# Patient Record
Sex: MALE | Race: WHITE
[De-identification: names, ages, dates, MRNs, and addresses within clinical notes are randomized per-mention and may not be internally consistent; named-entity substitution may affect disease eponyms.]

---

## 2017-07-03 ENCOUNTER — APPOINTMENT (OUTPATIENT)
Dept: DERMATOLOGY | Facility: CLINIC | Age: 82
End: 2017-07-03

## 2017-07-03 VITALS — BODY MASS INDEX: 24.59 KG/M2 | WEIGHT: 153 LBS | HEIGHT: 66 IN

## 2017-07-03 DIAGNOSIS — Z86.69 PERSONAL HISTORY OF OTHER DISEASES OF THE NERVOUS SYSTEM AND SENSE ORGANS: ICD-10-CM

## 2017-07-03 DIAGNOSIS — H54.7 UNSPECIFIED VISUAL LOSS: ICD-10-CM

## 2017-07-03 RX ORDER — AMMONIUM LACTATE 12 %
12 CREAM (GRAM) TOPICAL
Qty: 140 | Refills: 0 | Status: DISCONTINUED | COMMUNITY
Start: 2017-01-14

## 2017-07-03 RX ORDER — RIVAROXABAN 20 MG/1
20 TABLET, FILM COATED ORAL
Qty: 30 | Refills: 0 | Status: DISCONTINUED | COMMUNITY
Start: 2017-01-26

## 2017-07-03 RX ORDER — AZITHROMYCIN 250 MG/1
250 TABLET, FILM COATED ORAL
Qty: 6 | Refills: 0 | Status: DISCONTINUED | COMMUNITY
Start: 2017-01-19

## 2017-07-03 RX ORDER — ALBUTEROL SULFATE 90 UG/1
108 (90 BASE) AEROSOL, METERED RESPIRATORY (INHALATION)
Qty: 8 | Refills: 0 | Status: DISCONTINUED | COMMUNITY
Start: 2017-01-19

## 2017-07-03 RX ORDER — ASPIRIN 81 MG
81 TABLET, DELAYED RELEASE (ENTERIC COATED) ORAL
Refills: 0 | Status: ACTIVE | COMMUNITY

## 2017-08-03 ENCOUNTER — OUTPATIENT (OUTPATIENT)
Dept: OUTPATIENT SERVICES | Facility: HOSPITAL | Age: 82
LOS: 1 days | Discharge: HOME | End: 2017-08-03
Payer: MEDICARE

## 2017-08-03 DIAGNOSIS — Z98.890 OTHER SPECIFIED POSTPROCEDURAL STATES: Chronic | ICD-10-CM

## 2017-08-03 DIAGNOSIS — K40.90 UNILATERAL INGUINAL HERNIA, WITHOUT OBSTRUCTION OR GANGRENE, NOT SPECIFIED AS RECURRENT: ICD-10-CM

## 2017-08-03 LAB
ALBUMIN SERPL ELPH-MCNC: 3.9 G/DL — SIGNIFICANT CHANGE UP (ref 3.3–5)
ALP SERPL-CCNC: 105 U/L — SIGNIFICANT CHANGE UP (ref 40–120)
ALT FLD-CCNC: 33 U/L — SIGNIFICANT CHANGE UP (ref 12–78)
ANION GAP SERPL CALC-SCNC: 6 MMOL/L — SIGNIFICANT CHANGE UP (ref 5–17)
APPEARANCE UR: CLEAR — SIGNIFICANT CHANGE UP
AST SERPL-CCNC: 31 U/L — SIGNIFICANT CHANGE UP (ref 15–37)
BACTERIA # UR AUTO: (no result)
BASOPHILS # BLD AUTO: 0.1 K/UL — SIGNIFICANT CHANGE UP (ref 0–0.2)
BASOPHILS NFR BLD AUTO: 1.3 % — SIGNIFICANT CHANGE UP (ref 0–2)
BILIRUB SERPL-MCNC: 0.7 MG/DL — SIGNIFICANT CHANGE UP (ref 0.2–1.2)
BILIRUB UR-MCNC: NEGATIVE — SIGNIFICANT CHANGE UP
BUN SERPL-MCNC: 22 MG/DL — SIGNIFICANT CHANGE UP (ref 7–23)
CALCIUM SERPL-MCNC: 9 MG/DL — SIGNIFICANT CHANGE UP (ref 8.5–10.1)
CHLORIDE SERPL-SCNC: 104 MMOL/L — SIGNIFICANT CHANGE UP (ref 96–108)
CO2 SERPL-SCNC: 26 MMOL/L — SIGNIFICANT CHANGE UP (ref 22–31)
COLOR SPEC: YELLOW — SIGNIFICANT CHANGE UP
CREAT SERPL-MCNC: 0.98 MG/DL — SIGNIFICANT CHANGE UP (ref 0.5–1.3)
DIFF PNL FLD: (no result)
EOSINOPHIL # BLD AUTO: 0.4 K/UL — SIGNIFICANT CHANGE UP (ref 0–0.5)
EOSINOPHIL NFR BLD AUTO: 6.4 % — HIGH (ref 0–6)
EPI CELLS # UR: SIGNIFICANT CHANGE UP
GLUCOSE SERPL-MCNC: 110 MG/DL — HIGH (ref 70–99)
GLUCOSE UR QL: NEGATIVE MG/DL — SIGNIFICANT CHANGE UP
HCT VFR BLD CALC: 43.2 % — SIGNIFICANT CHANGE UP (ref 39–50)
HGB BLD-MCNC: 15 G/DL — SIGNIFICANT CHANGE UP (ref 13–17)
KETONES UR-MCNC: NEGATIVE — SIGNIFICANT CHANGE UP
LEUKOCYTE ESTERASE UR-ACNC: NEGATIVE — SIGNIFICANT CHANGE UP
LYMPHOCYTES # BLD AUTO: 1.2 K/UL — SIGNIFICANT CHANGE UP (ref 1–3.3)
LYMPHOCYTES # BLD AUTO: 20.8 % — SIGNIFICANT CHANGE UP (ref 13–44)
MCHC RBC-ENTMCNC: 29.5 PG — SIGNIFICANT CHANGE UP (ref 27–34)
MCHC RBC-ENTMCNC: 34.7 GM/DL — SIGNIFICANT CHANGE UP (ref 32–36)
MCV RBC AUTO: 85.1 FL — SIGNIFICANT CHANGE UP (ref 80–100)
MONOCYTES # BLD AUTO: 0.4 K/UL — SIGNIFICANT CHANGE UP (ref 0–0.9)
MONOCYTES NFR BLD AUTO: 7.2 % — SIGNIFICANT CHANGE UP (ref 2–14)
MRSA PCR RESULT.: SIGNIFICANT CHANGE UP
NEUTROPHILS # BLD AUTO: 3.7 K/UL — SIGNIFICANT CHANGE UP (ref 1.8–7.4)
NEUTROPHILS NFR BLD AUTO: 64.3 % — SIGNIFICANT CHANGE UP (ref 43–77)
NITRITE UR-MCNC: NEGATIVE — SIGNIFICANT CHANGE UP
PH UR: 5 — SIGNIFICANT CHANGE UP (ref 5–8)
PLATELET # BLD AUTO: 176 K/UL — SIGNIFICANT CHANGE UP (ref 150–400)
POTASSIUM SERPL-MCNC: 4.3 MMOL/L — SIGNIFICANT CHANGE UP (ref 3.5–5.3)
POTASSIUM SERPL-SCNC: 4.3 MMOL/L — SIGNIFICANT CHANGE UP (ref 3.5–5.3)
PROT SERPL-MCNC: 7.8 GM/DL — SIGNIFICANT CHANGE UP (ref 6–8.3)
PROT UR-MCNC: NEGATIVE MG/DL — SIGNIFICANT CHANGE UP
RBC # BLD: 5.08 M/UL — SIGNIFICANT CHANGE UP (ref 4.2–5.8)
RBC # FLD: 12.3 % — SIGNIFICANT CHANGE UP (ref 10.3–14.5)
RBC CASTS # UR COMP ASSIST: (no result) /HPF (ref 0–4)
S AUREUS DNA NOSE QL NAA+PROBE: SIGNIFICANT CHANGE UP
SODIUM SERPL-SCNC: 136 MMOL/L — SIGNIFICANT CHANGE UP (ref 135–145)
SP GR SPEC: 1.01 — SIGNIFICANT CHANGE UP (ref 1.01–1.02)
UROBILINOGEN FLD QL: NEGATIVE MG/DL — SIGNIFICANT CHANGE UP
WBC # BLD: 5.7 K/UL — SIGNIFICANT CHANGE UP (ref 3.8–10.5)
WBC # FLD AUTO: 5.7 K/UL — SIGNIFICANT CHANGE UP (ref 3.8–10.5)
WBC UR QL: SIGNIFICANT CHANGE UP

## 2017-08-03 PROCEDURE — 93010 ELECTROCARDIOGRAM REPORT: CPT

## 2017-08-10 ENCOUNTER — OUTPATIENT (OUTPATIENT)
Dept: OUTPATIENT SERVICES | Facility: HOSPITAL | Age: 82
LOS: 1 days | Discharge: HOME | End: 2017-08-10

## 2017-08-10 VITALS
HEART RATE: 73 BPM | OXYGEN SATURATION: 97 % | SYSTOLIC BLOOD PRESSURE: 141 MMHG | HEIGHT: 66 IN | WEIGHT: 155.43 LBS | DIASTOLIC BLOOD PRESSURE: 74 MMHG | RESPIRATION RATE: 16 BRPM | TEMPERATURE: 97 F

## 2017-08-10 VITALS
DIASTOLIC BLOOD PRESSURE: 72 MMHG | RESPIRATION RATE: 16 BRPM | SYSTOLIC BLOOD PRESSURE: 128 MMHG | TEMPERATURE: 97 F | OXYGEN SATURATION: 98 % | HEART RATE: 79 BPM

## 2017-08-10 DIAGNOSIS — Z98.890 OTHER SPECIFIED POSTPROCEDURAL STATES: Chronic | ICD-10-CM

## 2017-08-10 RX ORDER — SODIUM CHLORIDE 9 MG/ML
3 INJECTION INTRAMUSCULAR; INTRAVENOUS; SUBCUTANEOUS EVERY 8 HOURS
Qty: 0 | Refills: 0 | Status: DISCONTINUED | OUTPATIENT
Start: 2017-08-10 | End: 2017-08-25

## 2017-08-10 RX ORDER — ACETAMINOPHEN 500 MG
650 TABLET ORAL EVERY 6 HOURS
Qty: 0 | Refills: 0 | Status: DISCONTINUED | OUTPATIENT
Start: 2017-08-10 | End: 2017-08-25

## 2017-08-10 RX ORDER — ONDANSETRON 8 MG/1
4 TABLET, FILM COATED ORAL ONCE
Qty: 0 | Refills: 0 | Status: DISCONTINUED | OUTPATIENT
Start: 2017-08-10 | End: 2017-08-10

## 2017-08-10 RX ORDER — OXYCODONE HYDROCHLORIDE 5 MG/1
5 TABLET ORAL EVERY 4 HOURS
Qty: 0 | Refills: 0 | Status: DISCONTINUED | OUTPATIENT
Start: 2017-08-10 | End: 2017-08-10

## 2017-08-10 RX ORDER — ACETAMINOPHEN 500 MG
975 TABLET ORAL ONCE
Qty: 0 | Refills: 0 | Status: COMPLETED | OUTPATIENT
Start: 2017-08-10 | End: 2017-08-10

## 2017-08-10 RX ORDER — FENTANYL CITRATE 50 UG/ML
50 INJECTION INTRAVENOUS
Qty: 0 | Refills: 0 | Status: DISCONTINUED | OUTPATIENT
Start: 2017-08-10 | End: 2017-08-10

## 2017-08-10 RX ORDER — ASPIRIN/CALCIUM CARB/MAGNESIUM 324 MG
81 TABLET ORAL DAILY
Qty: 0 | Refills: 0 | Status: DISCONTINUED | OUTPATIENT
Start: 2017-08-10 | End: 2017-08-10

## 2017-08-10 RX ORDER — ASPIRIN/CALCIUM CARB/MAGNESIUM 324 MG
81 TABLET ORAL DAILY
Qty: 0 | Refills: 0 | Status: DISCONTINUED | OUTPATIENT
Start: 2017-08-10 | End: 2017-08-25

## 2017-08-10 RX ORDER — SODIUM CHLORIDE 9 MG/ML
1000 INJECTION, SOLUTION INTRAVENOUS
Qty: 0 | Refills: 0 | Status: DISCONTINUED | OUTPATIENT
Start: 2017-08-10 | End: 2017-08-10

## 2017-08-10 RX ADMIN — Medication 975 MILLIGRAM(S): at 09:58

## 2017-08-10 RX ADMIN — SODIUM CHLORIDE 100 MILLILITER(S): 9 INJECTION, SOLUTION INTRAVENOUS at 12:44

## 2017-08-10 NOTE — ASU DISCHARGE PLAN (ADULT/PEDIATRIC). - MEDICATION SUMMARY - MEDICATIONS TO TAKE
I will START or STAY ON the medications listed below when I get home from the hospital:    red rice yeast  -- 1 cap(s) by mouth once a day  -- Indication: For RECURRENT RIGHT INGUINAL HERNIA    aspirin 81 mg oral tablet  -- 1 tab(s) by mouth once a day    hold 1 week before surgery  -- Indication: For RECURRENT RIGHT INGUINAL HERNIA    Co Q-10 100 mg oral capsule  -- 1 cap(s) by mouth once a day  -- Indication: For RECURRENT RIGHT INGUINAL HERNIA    Fish Oil oral capsule  -- 1 cap(s) by mouth once a day  -- Indication: For RECURRENT RIGHT INGUINAL HERNIA

## 2017-08-10 NOTE — BRIEF OPERATIVE NOTE - POST-OP DX
Unilateral recurrent inguinal hernia without obstruction or gangrene  08/10/2017    Active  Yung Dewey

## 2017-08-15 DIAGNOSIS — K40.91 UNILATERAL INGUINAL HERNIA, WITHOUT OBSTRUCTION OR GANGRENE, RECURRENT: ICD-10-CM

## 2017-08-15 DIAGNOSIS — Z86.718 PERSONAL HISTORY OF OTHER VENOUS THROMBOSIS AND EMBOLISM: ICD-10-CM

## 2018-10-14 ENCOUNTER — TRANSCRIPTION ENCOUNTER (OUTPATIENT)
Age: 83
End: 2018-10-14

## 2019-01-08 RX ORDER — ACETAMINOPHEN 500 MG
1000 TABLET ORAL ONCE
Qty: 0 | Refills: 0 | Status: DISCONTINUED | OUTPATIENT
Start: 2019-01-09 | End: 2019-01-24

## 2019-01-08 RX ORDER — SODIUM CHLORIDE 9 MG/ML
1000 INJECTION, SOLUTION INTRAVENOUS
Qty: 0 | Refills: 0 | Status: DISCONTINUED | OUTPATIENT
Start: 2019-01-09 | End: 2019-01-24

## 2019-01-08 RX ORDER — OXYCODONE HYDROCHLORIDE 5 MG/1
5 TABLET ORAL ONCE
Qty: 0 | Refills: 0 | Status: DISCONTINUED | OUTPATIENT
Start: 2019-01-09 | End: 2019-01-09

## 2019-01-08 RX ORDER — ONDANSETRON 8 MG/1
4 TABLET, FILM COATED ORAL ONCE
Qty: 0 | Refills: 0 | Status: DISCONTINUED | OUTPATIENT
Start: 2019-01-09 | End: 2019-01-24

## 2019-01-08 RX ORDER — FENTANYL CITRATE 50 UG/ML
50 INJECTION INTRAVENOUS
Qty: 0 | Refills: 0 | Status: DISCONTINUED | OUTPATIENT
Start: 2019-01-09 | End: 2019-01-09

## 2019-01-09 ENCOUNTER — RESULT REVIEW (OUTPATIENT)
Age: 84
End: 2019-01-09

## 2019-01-09 ENCOUNTER — OUTPATIENT (OUTPATIENT)
Dept: OUTPATIENT SERVICES | Facility: HOSPITAL | Age: 84
LOS: 1 days | Discharge: HOME | End: 2019-01-09
Payer: MEDICARE

## 2019-01-09 VITALS
RESPIRATION RATE: 16 BRPM | OXYGEN SATURATION: 96 % | SYSTOLIC BLOOD PRESSURE: 149 MMHG | HEART RATE: 85 BPM | TEMPERATURE: 98 F | DIASTOLIC BLOOD PRESSURE: 75 MMHG

## 2019-01-09 VITALS
OXYGEN SATURATION: 99 % | HEART RATE: 63 BPM | WEIGHT: 153 LBS | HEIGHT: 66 IN | RESPIRATION RATE: 16 BRPM | TEMPERATURE: 98 F | SYSTOLIC BLOOD PRESSURE: 168 MMHG | DIASTOLIC BLOOD PRESSURE: 76 MMHG

## 2019-01-09 DIAGNOSIS — Z98.890 OTHER SPECIFIED POSTPROCEDURAL STATES: Chronic | ICD-10-CM

## 2019-01-09 PROCEDURE — 88300 SURGICAL PATH GROSS: CPT | Mod: 26

## 2019-01-09 RX ORDER — TRAMADOL HYDROCHLORIDE 50 MG/1
1 TABLET ORAL
Qty: 28 | Refills: 0
Start: 2019-01-09 | End: 2019-01-15

## 2019-01-09 RX ORDER — CELECOXIB 200 MG/1
1 CAPSULE ORAL
Qty: 14 | Refills: 0
Start: 2019-01-09 | End: 2019-01-15

## 2019-01-09 NOTE — BRIEF OPERATIVE NOTE - OPERATION/FINDINGS
right open rotator cuff repair, distal clavicle resection, subacromial decompression, see operative dictation for details

## 2019-01-09 NOTE — ASU DISCHARGE PLAN (ADULT/PEDIATRIC). - MEDICATION SUMMARY - MEDICATIONS TO TAKE
I will START or STAY ON the medications listed below when I get home from the hospital:    red rice yeast  -- 1 cap(s) by mouth once a day  -- Indication: For home med    aspirin 81 mg oral tablet  -- 1 tab(s) by mouth once a day    hold 1 week before surgery  -- Indication: For home med    CeleBREX 200 mg oral capsule  -- 1 cap(s) by mouth 2 times a day   -- Do not take this drug if you are pregnant.  Medication should be taken with plenty of water.  Obtain medical advice before taking any non-prescription drugs as some may affect the action of this medication.  Take with food or milk.    -- Indication: For anti-inflammatory    traMADol 50 mg oral tablet  -- 1 tab(s) by mouth every 6 hours MDD:4  -- Caution federal law prohibits the transfer of this drug to any person other  than the person for whom it was prescribed.  May cause drowsiness.  Alcohol may intensify this effect.  Use care when operating dangerous machinery.  Obtain medical advice before taking any non-prescription drugs as some may affect the action of this medication.    -- Indication: For severe pain    Fish Oil oral capsule  -- 1 cap(s) by mouth once a day  -- Indication: For home med    Co Q-10 100 mg oral capsule  -- 1 cap(s) by mouth once a day  -- Indication: For home med

## 2019-01-09 NOTE — BRIEF OPERATIVE NOTE - PROCEDURE
<<-----Click on this checkbox to enter Procedure Rotator cuff repair  01/09/2019    Active  [id number]

## 2019-01-09 NOTE — ASU DISCHARGE PLAN (ADULT/PEDIATRIC). - SPECIAL INSTRUCTIONS
Follow up with Dr Fountain in 14-17 days. Call office for appointment. Take medications as prescribed. Keep dressing clean, dry, and intact. Rest, ice, and elevate affected extremity. Nonweightbearing right upper extremity in sling.

## 2019-01-10 LAB — SURGICAL PATHOLOGY FINAL REPORT - CH: SIGNIFICANT CHANGE UP

## 2019-01-13 DIAGNOSIS — M19.011 PRIMARY OSTEOARTHRITIS, RIGHT SHOULDER: ICD-10-CM
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
## 2019-01-13 DIAGNOSIS — S46.011A STRAIN OF MUSCLE(S) AND TENDON(S) OF THE ROTATOR CUFF OF RIGHT SHOULDER, INITIAL ENCOUNTER: ICD-10-CM
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
## 2019-07-11 ENCOUNTER — APPOINTMENT (OUTPATIENT)
Dept: ORTHOPEDIC SURGERY | Facility: CLINIC | Age: 84
End: 2019-07-11
Payer: MEDICARE

## 2019-07-11 ENCOUNTER — OUTPATIENT (OUTPATIENT)
Dept: OUTPATIENT SERVICES | Facility: HOSPITAL | Age: 84
LOS: 1 days | End: 2019-07-11
Payer: MEDICARE

## 2019-07-11 ENCOUNTER — TRANSCRIPTION ENCOUNTER (OUTPATIENT)
Age: 84
End: 2019-07-11

## 2019-07-11 VITALS
RESPIRATION RATE: 14 BRPM | HEIGHT: 66 IN | WEIGHT: 152.12 LBS | HEART RATE: 73 BPM | OXYGEN SATURATION: 99 % | DIASTOLIC BLOOD PRESSURE: 60 MMHG | SYSTOLIC BLOOD PRESSURE: 130 MMHG | TEMPERATURE: 98 F

## 2019-07-11 DIAGNOSIS — G56.01 CARPAL TUNNEL SYNDROME, RIGHT UPPER LIMB: ICD-10-CM

## 2019-07-11 DIAGNOSIS — M65.321 TRIGGER FINGER, RIGHT INDEX FINGER: ICD-10-CM

## 2019-07-11 DIAGNOSIS — M65.331 TRIGGER FINGER, RIGHT MIDDLE FINGER: ICD-10-CM

## 2019-07-11 DIAGNOSIS — Z98.890 OTHER SPECIFIED POSTPROCEDURAL STATES: ICD-10-CM

## 2019-07-11 DIAGNOSIS — Z98.890 OTHER SPECIFIED POSTPROCEDURAL STATES: Chronic | ICD-10-CM

## 2019-07-11 DIAGNOSIS — Z98.49 CATARACT EXTRACTION STATUS, UNSPECIFIED EYE: Chronic | ICD-10-CM

## 2019-07-11 DIAGNOSIS — M65.311 TRIGGER THUMB, RIGHT THUMB: ICD-10-CM

## 2019-07-11 PROCEDURE — 93010 ELECTROCARDIOGRAM REPORT: CPT

## 2019-07-11 PROCEDURE — G0463: CPT

## 2019-07-11 PROCEDURE — 99203 OFFICE O/P NEW LOW 30 MIN: CPT

## 2019-07-11 PROCEDURE — 93005 ELECTROCARDIOGRAM TRACING: CPT

## 2019-07-11 NOTE — DISCUSSION/SUMMARY
[FreeTextEntry1] : The underlying pathophysiology was reviewed with the patient. Treatment options were discussed including; surgical intervention. \par \par The patient wishes to proceed with right CTR and right hand trigger finger release thumb, index, and long at this time. The risks and benefits were reviewed with the patient. All of her questions were answered. She will meet with our surgical scheduler.

## 2019-07-11 NOTE — END OF VISIT
[FreeTextEntry3] : I, Gordy Blair MD, ordering physician, have read and attest that all the information, medical decision making and discharge instructions within are true and accurate.

## 2019-07-11 NOTE — H&P PST ADULT - NSICDXPROBLEM_GEN_ALL_CORE_FT
PROBLEM DIAGNOSES  Problem: History of carpal tunnel release  Assessment and Plan: Right carpal tunnel release , right thumb, index, and middle finger trigger release   Medical clearance  pre op instructions

## 2019-07-11 NOTE — HISTORY OF PRESENT ILLNESS
[Right] : right hand dominant [FreeTextEntry1] : Patient is a 84 year old male who presents c/o right hand numbness and tingling x 6 months.  He states that he has right shoulder RTC repair in January of this year by Dr. Emery. He states that when he took the sling off, he felt numbness in his fingers.  He had an EMG which he states revealed CTS. He does not have the report with him today. The numbness is constant. It is localized to the thumb, index, and long fingers.  He cannot identify exacerbating or relieving factors.  He frequently drops objects.  He does not wear a wrist support splint. He has also been treated with 2 cortisone injections by the neurologist who performed the EMG. He presents today to discuss having CTR.

## 2019-07-11 NOTE — H&P PST ADULT - MUSCULOSKELETAL COMMENTS
right carpal tunnel and right thumb, index and middle finger trigger release Right hand tender on palpation , thumb, index and middle finger trigger finger

## 2019-07-11 NOTE — H&P PST ADULT - NSICDXPASTSURGICALHX_GEN_ALL_CORE_FT
PAST SURGICAL HISTORY:  H/O colonoscopy 2014    H/O inguinal hernia repair 2017 right    H/O squamous cell carcinoma excision Bilateral ear 2018    S/P cataract surgery 2009 bilateral    S/P foot surgery, right 1968 PAST SURGICAL HISTORY:  H/O colonoscopy 2014    H/O inguinal hernia repair 2017 right    H/O squamous cell carcinoma excision Bilateral ear 2018    S/P arthroscopy of right shoulder 2019    S/P cataract surgery 2009 bilateral    S/P foot surgery, right 1968

## 2019-07-11 NOTE — H&P PST ADULT - RS GEN PE MLT RESP DETAILS PC
no rhonchi/breath sounds equal/no rales/respirations non-labored/good air movement/clear to auscultation bilaterally

## 2019-07-11 NOTE — H&P PST ADULT - HISTORY OF PRESENT ILLNESS
This is a 83 y/o male who presents with complaint of worsening right hand thumb, index , and middle fingers pain , numbness, weakness and unable to , and hold things. Also complaint of trigger finger of thumb, index and middle finger . Report pain ,stiffness and inability to bend the fingers . scheduled for right carpal tunnel release and right thumb, index and middle finger trigger  release on 7/12/19 This is a 83 y/o male who presents with complaint of right hand fingers (thumb, index, middle )pain , numbness, weakness and unable to , and hold things. Also complaint of trigger finger of thumb, index and middle finger . Report pain ,stiffness and inability to bend the fingers . scheduled for right carpal tunnel release and right thumb, index and middle finger trigger  release on 7/12/19 This is a 83 y/o male who presents with complaint of right hand pain ,numbness, weakness and unable to /hold things especially thumb, index and middle fingers . Also complaint of trigger finger of thumb, index and middle finger . Report pain ,stiffness and inability to bend the fingers . scheduled for right carpal tunnel release and right thumb, index and middle finger trigger  release on 7/12/19

## 2019-07-11 NOTE — H&P PST ADULT - NSICDXPASTMEDICALHX_GEN_ALL_CORE_FT
PAST MEDICAL HISTORY:  Arthritis     Deep vein thrombosis (DVT) Right leg 2016 ? unknown cause    [place] (hard of hearing) uses hearing aid    Squamous cell carcinoma PAST MEDICAL HISTORY:  Arthritis     Deep vein thrombosis (DVT) Right leg 2016 ? unknown cause    [place] (hard of hearing) uses hearing aid    Right carpal tunnel syndrome     Squamous cell carcinoma

## 2019-07-11 NOTE — PHYSICAL EXAM
[de-identified] : Patient is well-nourished, well developed, alert and in no acute distress. Breathing is unlabored. He is grossly oriented to person, place, and time. \par \par Right Wrist: Heberden's nodules through the tips of the fingers. There is A1 pulley tenderness in the thumb, index, and long finger,edema, or deformities. Thenar atrophy is noted. Full ROM with decreased sensation along median nerve distribution. \par Tests/Signs: Tinel's sign is negative over carpal tunnel, Phalen's test is positive.  [de-identified] : No imaging done today.

## 2019-07-11 NOTE — ADDENDUM
[FreeTextEntry1] : I, Carolyn Og wrote this note acting as a scribe for Dr. Gordy Blair on Jul 11, 2019.

## 2019-07-12 ENCOUNTER — OUTPATIENT (OUTPATIENT)
Dept: OUTPATIENT SERVICES | Facility: HOSPITAL | Age: 84
LOS: 1 days | End: 2019-07-12
Payer: MEDICARE

## 2019-07-12 ENCOUNTER — APPOINTMENT (OUTPATIENT)
Dept: ORTHOPEDIC SURGERY | Facility: HOSPITAL | Age: 84
End: 2019-07-12

## 2019-07-12 VITALS
RESPIRATION RATE: 25 BRPM | WEIGHT: 148.37 LBS | OXYGEN SATURATION: 98 % | HEIGHT: 66 IN | DIASTOLIC BLOOD PRESSURE: 74 MMHG | TEMPERATURE: 98 F | HEART RATE: 70 BPM | SYSTOLIC BLOOD PRESSURE: 170 MMHG

## 2019-07-12 VITALS
RESPIRATION RATE: 15 BRPM | DIASTOLIC BLOOD PRESSURE: 60 MMHG | HEART RATE: 56 BPM | OXYGEN SATURATION: 99 % | SYSTOLIC BLOOD PRESSURE: 120 MMHG

## 2019-07-12 DIAGNOSIS — G56.01 CARPAL TUNNEL SYNDROME, RIGHT UPPER LIMB: ICD-10-CM

## 2019-07-12 DIAGNOSIS — Z98.890 OTHER SPECIFIED POSTPROCEDURAL STATES: Chronic | ICD-10-CM

## 2019-07-12 DIAGNOSIS — M65.321 TRIGGER FINGER, RIGHT INDEX FINGER: ICD-10-CM

## 2019-07-12 DIAGNOSIS — M65.311 TRIGGER THUMB, RIGHT THUMB: ICD-10-CM

## 2019-07-12 DIAGNOSIS — M65.331 TRIGGER FINGER, RIGHT MIDDLE FINGER: ICD-10-CM

## 2019-07-12 DIAGNOSIS — Z98.49 CATARACT EXTRACTION STATUS, UNSPECIFIED EYE: Chronic | ICD-10-CM

## 2019-07-12 PROCEDURE — 64721 CARPAL TUNNEL SURGERY: CPT | Mod: RT

## 2019-07-12 PROCEDURE — 26055 INCISE FINGER TENDON SHEATH: CPT | Mod: F6

## 2019-07-12 PROCEDURE — 20605 DRAIN/INJ JOINT/BURSA W/O US: CPT | Mod: RT

## 2019-07-12 RX ORDER — SODIUM CHLORIDE 9 MG/ML
1000 INJECTION, SOLUTION INTRAVENOUS
Refills: 0 | Status: DISCONTINUED | OUTPATIENT
Start: 2019-07-12 | End: 2019-07-12

## 2019-07-12 RX ORDER — CEFAZOLIN SODIUM 1 G
2000 VIAL (EA) INJECTION ONCE
Refills: 0 | Status: COMPLETED | OUTPATIENT
Start: 2019-07-12 | End: 2019-07-12

## 2019-07-12 RX ORDER — IBUPROFEN 200 MG
1 TABLET ORAL
Qty: 20 | Refills: 0
Start: 2019-07-12

## 2019-07-12 RX ORDER — HYDROMORPHONE HYDROCHLORIDE 2 MG/ML
0.5 INJECTION INTRAMUSCULAR; INTRAVENOUS; SUBCUTANEOUS
Refills: 0 | Status: DISCONTINUED | OUTPATIENT
Start: 2019-07-12 | End: 2019-07-12

## 2019-07-12 RX ORDER — ONDANSETRON 8 MG/1
4 TABLET, FILM COATED ORAL ONCE
Refills: 0 | Status: DISCONTINUED | OUTPATIENT
Start: 2019-07-12 | End: 2019-07-12

## 2019-07-12 NOTE — ASU DISCHARGE PLAN (ADULT/PEDIATRIC) - CARE PROVIDER_API CALL
Gordy Blair)  Orthopaedic Surgery  [address]  [place] [address]  Phone: [phone]  Fax: [phone]  Follow Up Time:

## 2019-07-12 NOTE — ASU PATIENT PROFILE, ADULT - PSH
H/O colonoscopy  2014  H/O inguinal hernia repair  2017 right  H/O squamous cell carcinoma excision  Bilateral ear 2018  S/P arthroscopy of right shoulder  2019  S/P cataract surgery  2009 bilateral  S/P foot surgery, right  1968

## 2019-07-12 NOTE — ASU PATIENT PROFILE, ADULT - PMH
Arthritis    Deep vein thrombosis (DVT)  Right leg 2016 ? unknown cause  [place] (hard of hearing)  uses hearing aid  Right carpal tunnel syndrome    Squamous cell carcinoma

## 2019-07-12 NOTE — BRIEF OPERATIVE NOTE - NSICDXBRIEFPOSTOP_GEN_ALL_CORE_FT
POST-OP DIAGNOSIS:  Trigger finger, right 12-Jul-2019 11:38:31 thumb, index, long fingers Mariann Mejia  Carpal tunnel syndrome 12-Jul-2019 11:37:38 left Mariann Mejia

## 2019-07-12 NOTE — ASU PREOP CHECKLIST - SKIN PREP
[de-identified] : mild tenderness to palpation of the lateral aspect of the dorsum of the L foot. there is no swelling or overlying erythema.
n/a

## 2019-07-12 NOTE — BRIEF OPERATIVE NOTE - NSICDXBRIEFPROCEDURE_GEN_ALL_CORE_FT
PROCEDURES:  Trigger finger release 12-Jul-2019 11:35:23 3 digits right Mariann Mejia  Release, carpal tunnel 12-Jul-2019 11:35:03 right Mariann Mejia

## 2019-07-12 NOTE — ASU DISCHARGE PLAN (ADULT/PEDIATRIC) - CALL YOUR DOCTOR IF YOU HAVE ANY OF THE FOLLOWING:
Fever greater than (need to indicate Fahrenheit or Celsius)/Wound/Surgical Site with redness, or foul smelling discharge or pus/Numbness, tingling, color or temperature change to extremity/Pain not relieved by Medications/Bleeding that does not stop/Swelling that gets worse

## 2019-07-12 NOTE — BRIEF OPERATIVE NOTE - NSICDXBRIEFPREOP_GEN_ALL_CORE_FT
PRE-OP DIAGNOSIS:  Trigger finger, right 12-Jul-2019 11:36:14 thumb, index, long fingers Mariann Mejia  Carpal tunnel syndrome 12-Jul-2019 11:35:48 left Mariann Mejia

## 2019-07-15 PROBLEM — I82.409 ACUTE EMBOLISM AND THROMBOSIS OF UNSPECIFIED DEEP VEINS OF UNSPECIFIED LOWER EXTREMITY: Chronic | Status: ACTIVE | Noted: 2019-07-11

## 2019-07-15 PROBLEM — C44.92 SQUAMOUS CELL CARCINOMA OF SKIN, UNSPECIFIED: Chronic | Status: ACTIVE | Noted: 2017-08-03

## 2019-07-15 PROBLEM — H91.90 UNSPECIFIED HEARING LOSS, UNSPECIFIED EAR: Chronic | Status: ACTIVE | Noted: 2019-07-11

## 2019-07-15 PROBLEM — M19.90 UNSPECIFIED OSTEOARTHRITIS, UNSPECIFIED SITE: Chronic | Status: ACTIVE | Noted: 2019-07-11

## 2019-07-22 ENCOUNTER — APPOINTMENT (OUTPATIENT)
Dept: ORTHOPEDIC SURGERY | Facility: CLINIC | Age: 84
End: 2019-07-22
Payer: MEDICARE

## 2019-07-22 PROCEDURE — 99024 POSTOP FOLLOW-UP VISIT: CPT

## 2019-07-22 NOTE — HISTORY OF PRESENT ILLNESS
[de-identified] : Patient is WDWN, alert, and in no acute distress. Breathing is unlabored. He is grossly oriented to person, place, and time. \par \par Incision is healed. There are no signs of infection. Sutures were removed. Normal amount of postoperative edema and tenderness present.  [de-identified] : s/p right CTR and right thumb, index, and long trigger finger release on 07/12/2019 [de-identified] : The patient returns for the 1st postoperative visit after undergoing  right CTR and right thumb, index, and long trigger finger release at [facility]. The surgery was on 07/12/2019. He reports mild postoperative pain. The patient is recovering at home. He reports numbness in the thumb, index and long finger although he states that he has been slowly regaining sensation in the thumb.  [de-identified] : Benzoin and steri strips were applied over the incision sites. Massage the scar with vitamin E oil once the strips have fallen off. Gentle range of motion exercises were reviewed with the patient. Patient may increase use of the hand, as tolerated. Follow up in 6 weeks.  [de-identified] : No imaging done today.

## 2019-07-22 NOTE — ADDENDUM
[FreeTextEntry1] : I, Carolyn Og wrote this note acting as a scribe for Dr. Gordy Blair on Jul 22, 2019.

## 2019-07-24 ENCOUNTER — TRANSCRIPTION ENCOUNTER (OUTPATIENT)
Age: 84
End: 2019-07-24

## 2019-08-05 ENCOUNTER — APPOINTMENT (OUTPATIENT)
Dept: DERMATOLOGY | Facility: CLINIC | Age: 84
End: 2019-08-05
Payer: MEDICARE

## 2019-08-05 DIAGNOSIS — L98.8 OTHER SPECIFIED DISORDERS OF THE SKIN AND SUBCUTANEOUS TISSUE: ICD-10-CM

## 2019-08-05 PROCEDURE — 17000 DESTRUCT PREMALG LESION: CPT

## 2019-08-05 PROCEDURE — 99213 OFFICE O/P EST LOW 20 MIN: CPT | Mod: 25

## 2019-08-05 PROCEDURE — 17003 DESTRUCT PREMALG LES 2-14: CPT

## 2019-08-05 NOTE — HISTORY OF PRESENT ILLNESS
[de-identified] : Pt. presents for skin check;\par No itching, bleeding, growing, changing lesions noted;\par persistent spots on back, also Left arm\par

## 2019-08-05 NOTE — PHYSICAL EXAM
[Full Body Skin Exam Performed] : performed [FreeTextEntry3] : Skin examination performed of the face, neck, trunk, arms, legs; \par The patient is well, alert and oriented, pleasant and cooperative.\par Eyelids, conjunctivae, oral mucosa, digits and nails all normal.  \par No cervical adenopathy.\par \par Normal findings include:\par \par Scaling waxy stuck on papule; L upper inner arm, larger lesion on R back\par scaling erythematous papule; Left sideburn, R posterior ear; \par Angiomas\par Lentigines\par \par No lesions were suspicious for malignancy. \par \par \par scaling erythematous papules;  R upper posterior ear, L mid ear helix

## 2019-08-05 NOTE — ASSESSMENT
[FreeTextEntry1] : Complete skin examination is negative for malignancy;\par LN2 to AKs;  prior lesions on ears did well\par Benign SKs;  no tx needed; \par Follow up for TBSE in 1 year \par

## 2019-11-21 ENCOUNTER — APPOINTMENT (OUTPATIENT)
Dept: ORTHOPEDIC SURGERY | Facility: CLINIC | Age: 84
End: 2019-11-21
Payer: MEDICARE

## 2019-11-21 DIAGNOSIS — G56.01 CARPAL TUNNEL SYNDROME, RIGHT UPPER LIMB: ICD-10-CM

## 2019-11-21 PROCEDURE — 99213 OFFICE O/P EST LOW 20 MIN: CPT

## 2019-11-21 NOTE — ADDENDUM
[FreeTextEntry1] : I, Carolyn Og wrote this note acting as a scribe for Dr. Gordy Blair on Nov 21, 2019.

## 2019-11-21 NOTE — DISCUSSION/SUMMARY
[FreeTextEntry1] : The patient wishes to proceed with left carpal tunnel release at this time. The risks and benefits were reviewed with the patient. All of his questions were answered. He will meet with our surgical scheduler.

## 2019-11-21 NOTE — PHYSICAL EXAM
[de-identified] : Patient is WDWN, alert, and in no acute distress. Breathing is unlabored. He is grossly oriented to person, place, and time. \par \par Left Wrist: No tenderness, edema, or deformities. No thenar atrophy. Full ROM with decreased sensation along median nerve distribution. \par Tests/Signs: Tinel's sign is negative over carpal tunnel, Phalen's test is positive. \par   [de-identified] : No imaging done today.

## 2019-11-21 NOTE — HISTORY OF PRESENT ILLNESS
[FreeTextEntry1] : The patient is an 84 year old male who presents with evaluation of moderate-to-severe paresthesia and pain in the left hand. The numbness is localized to the thumb, index and long finger. He states the symptoms are intermittent and are worsening over time. It has an aching quality. The tingling is worse in the morning. The patient has no additional complains. He denies triggering in any of the fingers. \par \par He is s/p right CTR and right thumb, index, and long trigger finger release at [facility]. The surgery was on 07/12/2019.

## 2019-11-25 ENCOUNTER — OUTPATIENT (OUTPATIENT)
Dept: OUTPATIENT SERVICES | Facility: HOSPITAL | Age: 84
LOS: 1 days | End: 2019-11-25
Payer: MEDICARE

## 2019-11-25 VITALS
WEIGHT: 145.06 LBS | OXYGEN SATURATION: 96 % | HEIGHT: 65 IN | TEMPERATURE: 98 F | DIASTOLIC BLOOD PRESSURE: 74 MMHG | SYSTOLIC BLOOD PRESSURE: 150 MMHG | HEART RATE: 74 BPM | RESPIRATION RATE: 16 BRPM

## 2019-11-25 DIAGNOSIS — G56.02 CARPAL TUNNEL SYNDROME, LEFT UPPER LIMB: ICD-10-CM

## 2019-11-25 DIAGNOSIS — Z98.890 OTHER SPECIFIED POSTPROCEDURAL STATES: Chronic | ICD-10-CM

## 2019-11-25 DIAGNOSIS — Z01.818 ENCOUNTER FOR OTHER PREPROCEDURAL EXAMINATION: ICD-10-CM

## 2019-11-25 DIAGNOSIS — Z98.49 CATARACT EXTRACTION STATUS, UNSPECIFIED EYE: Chronic | ICD-10-CM

## 2019-11-25 LAB
ALBUMIN SERPL ELPH-MCNC: 3.8 G/DL — SIGNIFICANT CHANGE UP (ref 3.3–5)
ALP SERPL-CCNC: 104 U/L — SIGNIFICANT CHANGE UP (ref 30–120)
ALT FLD-CCNC: 36 U/L DA — SIGNIFICANT CHANGE UP (ref 10–60)
ANION GAP SERPL CALC-SCNC: 8 MMOL/L — SIGNIFICANT CHANGE UP (ref 5–17)
AST SERPL-CCNC: 31 U/L — SIGNIFICANT CHANGE UP (ref 10–40)
BILIRUB SERPL-MCNC: 0.4 MG/DL — SIGNIFICANT CHANGE UP (ref 0.2–1.2)
BUN SERPL-MCNC: 24 MG/DL — HIGH (ref 7–23)
CALCIUM SERPL-MCNC: 8.7 MG/DL — SIGNIFICANT CHANGE UP (ref 8.4–10.5)
CHLORIDE SERPL-SCNC: 104 MMOL/L — SIGNIFICANT CHANGE UP (ref 96–108)
CO2 SERPL-SCNC: 25 MMOL/L — SIGNIFICANT CHANGE UP (ref 22–31)
CREAT SERPL-MCNC: 1.02 MG/DL — SIGNIFICANT CHANGE UP (ref 0.5–1.3)
GLUCOSE SERPL-MCNC: 111 MG/DL — HIGH (ref 70–99)
HCT VFR BLD CALC: 42.5 % — SIGNIFICANT CHANGE UP (ref 39–50)
HGB BLD-MCNC: 14.1 G/DL — SIGNIFICANT CHANGE UP (ref 13–17)
MCHC RBC-ENTMCNC: 28.5 PG — SIGNIFICANT CHANGE UP (ref 27–34)
MCHC RBC-ENTMCNC: 33.2 GM/DL — SIGNIFICANT CHANGE UP (ref 32–36)
MCV RBC AUTO: 85.9 FL — SIGNIFICANT CHANGE UP (ref 80–100)
NRBC # BLD: 0 /100 WBCS — SIGNIFICANT CHANGE UP (ref 0–0)
PLATELET # BLD AUTO: 180 K/UL — SIGNIFICANT CHANGE UP (ref 150–400)
POTASSIUM SERPL-MCNC: 4.3 MMOL/L — SIGNIFICANT CHANGE UP (ref 3.5–5.3)
POTASSIUM SERPL-SCNC: 4.3 MMOL/L — SIGNIFICANT CHANGE UP (ref 3.5–5.3)
PROT SERPL-MCNC: 7.5 G/DL — SIGNIFICANT CHANGE UP (ref 6–8.3)
RBC # BLD: 4.95 M/UL — SIGNIFICANT CHANGE UP (ref 4.2–5.8)
RBC # FLD: 13.3 % — SIGNIFICANT CHANGE UP (ref 10.3–14.5)
SODIUM SERPL-SCNC: 137 MMOL/L — SIGNIFICANT CHANGE UP (ref 135–145)
WBC # BLD: 6.72 K/UL — SIGNIFICANT CHANGE UP (ref 3.8–10.5)
WBC # FLD AUTO: 6.72 K/UL — SIGNIFICANT CHANGE UP (ref 3.8–10.5)

## 2019-11-25 PROCEDURE — G0463: CPT

## 2019-11-25 PROCEDURE — 93005 ELECTROCARDIOGRAM TRACING: CPT

## 2019-11-25 PROCEDURE — 85027 COMPLETE CBC AUTOMATED: CPT

## 2019-11-25 PROCEDURE — 80053 COMPREHEN METABOLIC PANEL: CPT

## 2019-11-25 PROCEDURE — 93010 ELECTROCARDIOGRAM REPORT: CPT

## 2019-11-25 PROCEDURE — 36415 COLL VENOUS BLD VENIPUNCTURE: CPT

## 2019-11-25 NOTE — H&P PST ADULT - MUSCULOSKELETAL
details… detailed exam decreased ROM due to pain/no joint swelling/no joint erythema/no joint warmth/no calf tenderness

## 2019-11-25 NOTE — H&P PST ADULT - HISTORY OF PRESENT ILLNESS
83 yo male presents to PST at [facility] for scheduled left carpal tune release on 11/29/19 with Dr Blair.   Patient reports left wrist pain with numnbess in fingers.  He is status post right carpal tunnel right 2/2019 with good results.

## 2019-11-25 NOTE — H&P PST ADULT - ASSESSMENT
83 yo male is scheduled for left carpal tunnel release on 11/29/19 with Dr Blair at [facility].

## 2019-11-25 NOTE — H&P PST ADULT - NSICDXPASTSURGICALHX_GEN_ALL_CORE_FT
PAST SURGICAL HISTORY:  H/O colonoscopy 2014    H/O inguinal hernia repair 2017 right    H/O squamous cell carcinoma excision Bilateral ear 2018    History of carpal tunnel surgery of right wrist 2019    S/P arthroscopy of right shoulder 2019    S/P cataract surgery 2009 bilateral    S/P foot surgery, right 1968

## 2019-11-25 NOTE — H&P PST ADULT - NSICDXPROBLEM_GEN_ALL_CORE_FT
PROBLEM DIAGNOSES  Problem: Left carpal tunnel syndrome  Assessment and Plan: Left carpal tunnel release on 11/29/19  Pending medical clearance  Instructions reviewed; best wishes offered

## 2019-11-25 NOTE — H&P PST ADULT - NSICDXPASTMEDICALHX_GEN_ALL_CORE_FT
PAST MEDICAL HISTORY:  Arthritis     Deep vein thrombosis (DVT) Right leg 2016 ? unknown cause    [place] (hard of hearing) uses hearing aid    Left carpal tunnel syndrome     Squamous cell carcinoma

## 2019-11-27 NOTE — ASU PATIENT PROFILE, ADULT - PSH
H/O colonoscopy  2014  H/O inguinal hernia repair  2017 right  H/O squamous cell carcinoma excision  Bilateral ear 2018  History of carpal tunnel surgery of right wrist  2019  S/P arthroscopy of right shoulder  2019  S/P cataract surgery  2009 bilateral  S/P foot surgery, right  1968

## 2019-11-28 ENCOUNTER — TRANSCRIPTION ENCOUNTER (OUTPATIENT)
Age: 84
End: 2019-11-28

## 2019-11-29 ENCOUNTER — OUTPATIENT (OUTPATIENT)
Dept: OUTPATIENT SERVICES | Facility: HOSPITAL | Age: 84
LOS: 1 days | End: 2019-11-29
Payer: MEDICARE

## 2019-11-29 ENCOUNTER — APPOINTMENT (OUTPATIENT)
Dept: ORTHOPEDIC SURGERY | Facility: HOSPITAL | Age: 84
End: 2019-11-29

## 2019-11-29 VITALS
DIASTOLIC BLOOD PRESSURE: 62 MMHG | HEIGHT: 65 IN | WEIGHT: 145.95 LBS | RESPIRATION RATE: 21 BRPM | TEMPERATURE: 98 F | HEART RATE: 74 BPM | OXYGEN SATURATION: 97 % | SYSTOLIC BLOOD PRESSURE: 141 MMHG

## 2019-11-29 VITALS
OXYGEN SATURATION: 97 % | DIASTOLIC BLOOD PRESSURE: 68 MMHG | SYSTOLIC BLOOD PRESSURE: 135 MMHG | HEART RATE: 63 BPM | RESPIRATION RATE: 13 BRPM

## 2019-11-29 DIAGNOSIS — Z01.818 ENCOUNTER FOR OTHER PREPROCEDURAL EXAMINATION: ICD-10-CM

## 2019-11-29 DIAGNOSIS — Z98.890 OTHER SPECIFIED POSTPROCEDURAL STATES: Chronic | ICD-10-CM

## 2019-11-29 DIAGNOSIS — G56.02 CARPAL TUNNEL SYNDROME, LEFT UPPER LIMB: ICD-10-CM

## 2019-11-29 DIAGNOSIS — Z98.49 CATARACT EXTRACTION STATUS, UNSPECIFIED EYE: Chronic | ICD-10-CM

## 2019-11-29 PROCEDURE — 20605 DRAIN/INJ JOINT/BURSA W/O US: CPT | Mod: LT

## 2019-11-29 PROCEDURE — 64721 CARPAL TUNNEL SURGERY: CPT | Mod: LT

## 2019-11-29 RX ORDER — OXYCODONE HYDROCHLORIDE 5 MG/1
10 TABLET ORAL ONCE
Refills: 0 | Status: DISCONTINUED | OUTPATIENT
Start: 2019-11-29 | End: 2019-11-29

## 2019-11-29 RX ORDER — APREPITANT 80 MG/1
40 CAPSULE ORAL ONCE
Refills: 0 | Status: COMPLETED | OUTPATIENT
Start: 2019-11-29 | End: 2019-11-29

## 2019-11-29 RX ORDER — CHLORHEXIDINE GLUCONATE 213 G/1000ML
1 SOLUTION TOPICAL ONCE
Refills: 0 | Status: COMPLETED | OUTPATIENT
Start: 2019-11-29 | End: 2019-11-29

## 2019-11-29 RX ORDER — CEFAZOLIN SODIUM 1 G
2000 VIAL (EA) INJECTION ONCE
Refills: 0 | Status: COMPLETED | OUTPATIENT
Start: 2019-11-29 | End: 2019-11-29

## 2019-11-29 RX ORDER — SODIUM CHLORIDE 9 MG/ML
1000 INJECTION, SOLUTION INTRAVENOUS
Refills: 0 | Status: DISCONTINUED | OUTPATIENT
Start: 2019-11-29 | End: 2019-12-30

## 2019-11-29 RX ORDER — HYDROMORPHONE HYDROCHLORIDE 2 MG/ML
0.5 INJECTION INTRAMUSCULAR; INTRAVENOUS; SUBCUTANEOUS
Refills: 0 | Status: DISCONTINUED | OUTPATIENT
Start: 2019-11-29 | End: 2019-11-29

## 2019-11-29 RX ORDER — OXYCODONE HYDROCHLORIDE 5 MG/1
5 TABLET ORAL ONCE
Refills: 0 | Status: DISCONTINUED | OUTPATIENT
Start: 2019-11-29 | End: 2019-11-29

## 2019-11-29 RX ADMIN — SODIUM CHLORIDE 100 MILLILITER(S): 9 INJECTION, SOLUTION INTRAVENOUS at 10:37

## 2019-11-29 RX ADMIN — CHLORHEXIDINE GLUCONATE 1 APPLICATION(S): 213 SOLUTION TOPICAL at 07:04

## 2019-11-29 RX ADMIN — APREPITANT 40 MILLIGRAM(S): 80 CAPSULE ORAL at 07:03

## 2019-11-29 NOTE — ASU DISCHARGE PLAN (ADULT/PEDIATRIC) - ASU DC SPECIAL INSTRUCTIONSFT
keep clean and dry  elevate to help reduce swelling mobilize digits 20 times per hour to reduce possible stiffness and /or swelling   ice 20 minutes on alternating with 20 minutes off for 48 hours post op   call office for appointment  to be seen  10 days post op

## 2019-11-29 NOTE — ASU DISCHARGE PLAN (ADULT/PEDIATRIC) - CARE PROVIDER_API CALL
Gordy Blair)  Orthopaedic Surgery  [address]  [place] [address]  Phone: [phone]  Fax: [phone]  Follow Up Time:

## 2019-11-29 NOTE — ASU DISCHARGE PLAN (ADULT/PEDIATRIC) - CALL YOUR DOCTOR IF YOU HAVE ANY OF THE FOLLOWING:
Pain not relieved by Medications/Numbness, tingling, color or temperature change to extremity/Nausea and vomiting that does not stop/Unable to urinate/Swelling that gets worse/Wound/Surgical Site with redness, or foul smelling discharge or pus/Inability to tolerate liquids or foods/Bleeding that does not stop/Fever greater than (need to indicate Fahrenheit or Celsius) Numbness, tingling, color or temperature change to extremity/Swelling that gets worse/Wound/Surgical Site with redness, or foul smelling discharge or pus/Inability to tolerate liquids or foods/Bleeding that does not stop/Pain not relieved by Medications/Fever greater than (need to indicate Fahrenheit or Celsius)

## 2019-12-12 ENCOUNTER — APPOINTMENT (OUTPATIENT)
Dept: ORTHOPEDIC SURGERY | Facility: CLINIC | Age: 84
End: 2019-12-12
Payer: MEDICARE

## 2019-12-12 VITALS — DIASTOLIC BLOOD PRESSURE: 74 MMHG | SYSTOLIC BLOOD PRESSURE: 152 MMHG | HEART RATE: 72 BPM

## 2019-12-12 DIAGNOSIS — G56.02 CARPAL TUNNEL SYNDROME, LEFT UPPER LIMB: ICD-10-CM

## 2019-12-12 PROCEDURE — 99024 POSTOP FOLLOW-UP VISIT: CPT

## 2019-12-12 NOTE — HISTORY OF PRESENT ILLNESS
[de-identified] : s/p left CTR on 11/27/2019.  [de-identified] : The patient is a 84 year male who returns for the 1st postoperative visit after undergoing left carpal tunnel release at [facility]. The surgery was on 11/27/2019. The patient is recovering at home. He reports mild postoperative pain. He is otherwise doing well and is very pleased with the surgical outcome.  [de-identified] : Patient is WDWN, alert, and in no acute distress. Breathing is unlabored. He is grossly oriented to person, place, and time. \par \par Left Wrist: Incision is healing well. There are no signs of infection. Sutures were removed. Normal amount of postoperative edema and tenderness present.  [de-identified] : No imaging done today.  [de-identified] : Benzoin and steri strips were applied over the incision sites. Massage the scar with vitamin E oil once the strips have fallen off. Gentle range of motion exercises were encouraged. Patient may increase use of the hand, as tolerated. Follow up in 6 weeks.

## 2019-12-12 NOTE — ADDENDUM
[FreeTextEntry1] : I, Carolyn Og wrote this note acting as a scribe for Dr. Gordy Blair on Dec 12, 2019.

## 2020-02-29 NOTE — H&P PST ADULT - WEIGHT IN KG
Atrial fibrillation, CHADS2-VASc = 5. On home Coumadin 2.5mg daily for anticoagulation.  -decreased BB to 50 mg BID  -monitor PT/INR for daily Coumadin dosing, INR goal 2-3. HOLD COUMADIN TODAY. 70

## 2020-10-07 ENCOUNTER — APPOINTMENT (OUTPATIENT)
Dept: DERMATOLOGY | Facility: CLINIC | Age: 85
End: 2020-10-07
Payer: MEDICARE

## 2020-10-07 VITALS — HEIGHT: 66 IN | WEIGHT: 153 LBS | BODY MASS INDEX: 24.59 KG/M2

## 2020-10-07 DIAGNOSIS — Z00.00 ENCOUNTER FOR GENERAL ADULT MEDICAL EXAMINATION W/OUT ABNORMAL FINDINGS: ICD-10-CM

## 2020-10-07 PROCEDURE — 99213 OFFICE O/P EST LOW 20 MIN: CPT

## 2020-10-07 NOTE — HISTORY OF PRESENT ILLNESS
[FreeTextEntry1] : Patient presents for skin examination. [de-identified] : Wife noted lesion on the back, ? duration.  No bleeding or itching, patient otherwise unaware of lesion.

## 2021-02-04 NOTE — ASU PATIENT PROFILE, ADULT - PRO ARRIVE FROM
Eveline Carmichael M.D. History and Physical      CHIEF COMPLAINT: Atrial fibrillation    Reason for Admission: A. fib with RVR    History Obtained From: Patient/EMR    HISTORY OF PRESENT ILLNESS:      The patient is a 77 y.o. female of [place] with significant past medical history of multiple sclerosis, diverticulitis who presents with complaints of abdominal pain and shortness of breath that has been ongoing for approximately 1 week and getting worse. Patient was evaluated by PCP and placed on antibiotic therapy for presumed diverticulitis. She indicates she did not improve and began to have palpitations and shortness of breath. She has been having intermittent abdominal pain described like a bend in her upper abdomen as well. She presented to the ER for further evaluation and was found to be in atrial fibrillation with RVR she is without a known history of any heart disease or arrhythmias in the past.  Not on anticoagulation for any purpose. /86   Pulse 96   Temp 96.9 °F (36.1 °C)   Resp 16   Ht 5' 6\" (1.676 m)   Wt 225 lb (102.1 kg)   SpO2 100%   BMI 36.32 kg/m²      All labs personally reviewed-BUN/creatinine 53/2.2, BNP 12,000, TSH 0.856,  All imaging personally reviewed-no acute process, bilateral lung base nodule    Past Medical History:    History reviewed. No pertinent past medical history. Past Surgical History:    History reviewed. No pertinent surgical history. Medications Prior to Admission:    Not in a hospital admission. Allergies:  Patient has no known allergies. Social History:   TOBACCO:   reports that she has never smoked. She has never used smokeless tobacco.  ETOH:   reports previous alcohol use. MARITAL STATUS:    OCCUPATION:      Family History:   History reviewed. No pertinent family history.     REVIEW OF SYSTEMS:    General ROS:   Hematological and Lymphatic ROS: negative  Endocrine ROS: negative  Respiratory ROS: no cough, shortness of breath, or wheezing  Cardiovascular ROS: no chest pain or dyspnea on exertion  Gastrointestinal ROS: no abdominal pain, change in bowel habits, or black or bloody stools  Genito-Urinary ROS: no dysuria, trouble voiding, or hematuria  Neurological ROS: no TIA or stroke symptoms  negative    Vitals:  /86   Pulse 96   Temp 96.9 °F (36.1 °C)   Resp 16   Ht 5' 6\" (1.676 m)   Wt 225 lb (102.1 kg)   SpO2 100%   BMI 36.32 kg/m²     PHYSICAL EXAM:  General:  Awake, alert, oriented X 3. Well developed, well nourished, well groomed. No apparent distress. HEENT:  Normocephalic, atraumatic. Pupils equal, round, reactive to light. No scleral icterus. No conjunctival injection. Normal lips, teeth, and gums. No nasal discharge. Neck:  Supple, FROM  Heart:  RRR, no murmurs, gallops, rubs, carotid upstroke normal, no carotid bruits  Lungs:  CTA bilaterally, bilat symmetrical expansion, no wheeze, rales, or rhonchi  Abdomen: Bowel sounds present, soft, nontender, no masses, no organomegaly, no peritoneal signs  Extremities:  No clubbing, cyanosis, or edema  Skin:  Warm and dry, no open lesions or rash  Neuro:  Cranial nerves 2-12 intact, no focal deficits  Vascular: Radial and pedal Pulses 2+  Breast: deferred  Rectal: deferred  Genitalia:  deferred      DATA:     Recent Labs     02/03/21  1505   WBC 9.9   HGB 12.0        Recent Labs     02/03/21  1505      K 3.5   BUN 53*   CREATININE 2.2*     Recent Labs     02/03/21  1505 02/04/21  0607   PROT 7.0  --    INR  --  1.1     Recent Labs     02/03/21  1505   AST 17   ALT 16   ALKPHOS 65   BILITOT 0.3     No results for input(s): BNP in the last 72 hours. Recent Labs     02/03/21  1505   TROPONINI 0.01       ASSESSMENT:      Active Problems:    Atrial fibrillation with RVR (HCC)  Resolved Problems:    * No resolved hospital problems.  *        PLAN:    Admit to telemetry for evaluation of atrial fibrillation with RVR  IV Cardizem to be weaned off and transition to p.o.   Oral anticoagulation given [id number]  score of 2-3-initiate Eliquis 5 twice daily, renal adjustment as needed  Echocardiogram given elevated BNP  Doubt diverticulitis or flareup  Check pancultures  Cards following  DCCV in upcoming weeks at discretion of cards      Acute renal failure  IV fluid hydration with caution secondary to CHF  Stop nephrotoxins    DVT prophylaxis  PT OT  Discharge plan    Mynor [place] MD  2/4/2021  9:28 AM home

## 2021-11-03 ENCOUNTER — APPOINTMENT (OUTPATIENT)
Dept: DERMATOLOGY | Facility: CLINIC | Age: 86
End: 2021-11-03
Payer: MEDICARE

## 2021-11-03 VITALS — HEIGHT: 66 IN | WEIGHT: 153 LBS | BODY MASS INDEX: 24.59 KG/M2

## 2021-11-03 PROCEDURE — 99213 OFFICE O/P EST LOW 20 MIN: CPT

## 2021-11-03 NOTE — PHYSICAL EXAM
[Alert] : alert [Oriented x 3] : ~L oriented x 3 [Well Nourished] : well nourished [Full Body Skin Exam Performed] : performed [FreeTextEntry3] : A full skin exam was performed including the scalp, face (including lips, ears, nose and eyes), neck, chest, abdomen, back, buttocks, upper extremities and lower extremities.  The patient declined examination of the genitalia.  \par The exam revealed the following benign growths:\par [place] pigmented nevi.\par Seborrheic keratoses.\par Lentigines.\par \par Subungal ecchymosis, left 2nd toe, distal 80%, with normal nail at the proximal aspect, and normal nail folds.

## 2021-11-03 NOTE — HISTORY OF PRESENT ILLNESS
[FreeTextEntry1] : Patient presents for skin examination. [de-identified] : Denies new, changing, bleeding or tender lesions on the skin over the past year.\par

## 2021-11-03 NOTE — ASSESSMENT
[FreeTextEntry1] : A complete skin examination was performed.  There is no evidence of skin cancer.  We discussed the importance of photoprotection, including the use of hats, protective clothing and sunscreens with an SPF of at least 30.  Sun avoidance was also discussed.  The ABCDE's of melanoma was discussed.  Regular skin exams recommended.\par \par Subungal ecchymosis\par Patient admits hx of repeated trauma to region.\par follow.  Education.

## 2022-11-01 ENCOUNTER — NON-APPOINTMENT (OUTPATIENT)
Age: 87
End: 2022-11-01

## 2022-11-02 ENCOUNTER — APPOINTMENT (OUTPATIENT)
Dept: DERMATOLOGY | Facility: CLINIC | Age: 87
End: 2022-11-02

## 2022-11-02 DIAGNOSIS — D48.5 NEOPLASM OF UNCERTAIN BEHAVIOR OF SKIN: ICD-10-CM

## 2022-11-02 PROCEDURE — 99213 OFFICE O/P EST LOW 20 MIN: CPT | Mod: 25

## 2022-11-02 PROCEDURE — 11102 TANGNTL BX SKIN SINGLE LES: CPT

## 2022-11-02 RX ORDER — ATORVASTATIN CALCIUM 80 MG/1
TABLET, FILM COATED ORAL
Refills: 0 | Status: DISCONTINUED | COMMUNITY
End: 2022-11-02

## 2022-11-02 RX ORDER — ATORVASTATIN CALCIUM 10 MG/1
10 TABLET, FILM COATED ORAL
Qty: 90 | Refills: 0 | Status: ACTIVE | COMMUNITY
Start: 2022-08-16

## 2022-11-02 RX ORDER — ALBUTEROL SULFATE 2.5 MG/3ML
(2.5 MG/3ML) SOLUTION RESPIRATORY (INHALATION)
Qty: 180 | Refills: 0 | Status: ACTIVE | COMMUNITY
Start: 2022-04-04

## 2022-11-02 RX ORDER — BUDESONIDE 0.5 MG/2ML
0.5 INHALANT ORAL
Qty: 360 | Refills: 0 | Status: ACTIVE | COMMUNITY
Start: 2022-03-09

## 2022-11-02 NOTE — PHYSICAL EXAM
[Alert] : alert [Oriented x 3] : ~L oriented x 3 [Well Nourished] : well nourished [Full Body Skin Exam Performed] : performed [FreeTextEntry3] : A full skin exam was performed including the scalp, face (including lips, ears, nose and eyes), neck, chest, abdomen, back, buttocks, upper extremities and lower extremities.  The patient declined examination of the genitalia.  \par The exam revealed the following benign growths:\par [place] pigmented nevi.\par Seborrheic keratoses.\par Lentigines.\par \par erythematous patch, with crust, left upper arm, laterally.\par

## 2022-11-02 NOTE — HISTORY OF PRESENT ILLNESS
[FreeTextEntry1] : Patient presents for skin examination. [de-identified] : Notes irritated lesion of the left upper arm.

## 2022-11-02 NOTE — ASSESSMENT
[FreeTextEntry1] : A complete skin examination was performed.  We discussed the importance of photoprotection, including the use of hats, protective clothing and sunscreens with an SPF of at least 30.  Sun avoidance was also discussed.  The ABCDE's of melanoma was discussed with the patient.  Regular skin exams are encouraged.\par \par R/O sup. BCC of the left upper arm.\par Plan D&C if positive.\par \par f/u in 1 year

## 2022-11-08 LAB — CORE LAB BIOPSY: NORMAL

## 2022-12-06 ENCOUNTER — APPOINTMENT (OUTPATIENT)
Dept: DERMATOLOGY | Facility: CLINIC | Age: 87
End: 2022-12-06

## 2022-12-06 DIAGNOSIS — D04.9 CARCINOMA IN SITU OF SKIN, UNSPECIFIED: ICD-10-CM

## 2022-12-06 PROCEDURE — 17263 DSTRJ MAL LES T/A/L 2.1-3.0: CPT

## 2023-01-01 ENCOUNTER — TRANSCRIPTION ENCOUNTER (OUTPATIENT)
Age: 88
End: 2023-01-01

## 2023-01-01 ENCOUNTER — NON-APPOINTMENT (OUTPATIENT)
Age: 88
End: 2023-01-01

## 2023-01-01 ENCOUNTER — INPATIENT (INPATIENT)
Facility: HOSPITAL | Age: 88
LOS: 1 days | Discharge: HOME | DRG: 281 | End: 2023-09-25
Attending: INTERNAL MEDICINE | Admitting: HOSPITALIST
Payer: MEDICARE

## 2023-01-01 ENCOUNTER — INPATIENT (INPATIENT)
Facility: HOSPITAL | Age: 88
LOS: 1 days | Discharge: HOME | DRG: 247 | End: 2023-09-27
Attending: INTERNAL MEDICINE | Admitting: INTERNAL MEDICINE
Payer: MEDICARE

## 2023-01-01 ENCOUNTER — INPATIENT (INPATIENT)
Facility: HOSPITAL | Age: 88
LOS: 3 days | Discharge: HOME | DRG: 194 | End: 2023-07-28
Attending: HOSPITALIST | Admitting: INTERNAL MEDICINE
Payer: MEDICARE

## 2023-01-01 ENCOUNTER — APPOINTMENT (OUTPATIENT)
Dept: DERMATOLOGY | Facility: CLINIC | Age: 88
End: 2023-01-01
Payer: MEDICARE

## 2023-01-01 ENCOUNTER — APPOINTMENT (OUTPATIENT)
Dept: AFTER HOURS CARE | Facility: EMERGENCY ROOM | Age: 88
End: 2023-01-01
Payer: MEDICARE

## 2023-01-01 VITALS
OXYGEN SATURATION: 99 % | TEMPERATURE: 98 F | DIASTOLIC BLOOD PRESSURE: 56 MMHG | HEIGHT: 66 IN | SYSTOLIC BLOOD PRESSURE: 122 MMHG | RESPIRATION RATE: 20 BRPM | HEART RATE: 76 BPM | WEIGHT: 149.91 LBS

## 2023-01-01 VITALS
OXYGEN SATURATION: 98 % | RESPIRATION RATE: 16 BRPM | WEIGHT: 141.98 LBS | HEIGHT: 66 IN | DIASTOLIC BLOOD PRESSURE: 70 MMHG | SYSTOLIC BLOOD PRESSURE: 104 MMHG | HEART RATE: 68 BPM

## 2023-01-01 VITALS
TEMPERATURE: 98 F | DIASTOLIC BLOOD PRESSURE: 50 MMHG | OXYGEN SATURATION: 97 % | HEART RATE: 74 BPM | RESPIRATION RATE: 16 BRPM | SYSTOLIC BLOOD PRESSURE: 105 MMHG

## 2023-01-01 VITALS
HEART RATE: 106 BPM | TEMPERATURE: 98 F | OXYGEN SATURATION: 95 % | DIASTOLIC BLOOD PRESSURE: 74 MMHG | SYSTOLIC BLOOD PRESSURE: 126 MMHG | RESPIRATION RATE: 22 BRPM | WEIGHT: 141.98 LBS | HEIGHT: 63 IN

## 2023-01-01 VITALS — OXYGEN SATURATION: 98 %

## 2023-01-01 VITALS — WEIGHT: 141.98 LBS

## 2023-01-01 DIAGNOSIS — Z98.890 OTHER SPECIFIED POSTPROCEDURAL STATES: Chronic | ICD-10-CM

## 2023-01-01 DIAGNOSIS — J18.9 PNEUMONIA, UNSPECIFIED ORGANISM: ICD-10-CM

## 2023-01-01 DIAGNOSIS — I25.10 ATHEROSCLEROTIC HEART DISEASE OF NATIVE CORONARY ARTERY WITHOUT ANGINA PECTORIS: ICD-10-CM

## 2023-01-01 DIAGNOSIS — E78.5 HYPERLIPIDEMIA, UNSPECIFIED: ICD-10-CM

## 2023-01-01 DIAGNOSIS — D69.2 OTHER NONTHROMBOCYTOPENIC PURPURA: ICD-10-CM

## 2023-01-01 DIAGNOSIS — U07.1 COVID-19: ICD-10-CM

## 2023-01-01 DIAGNOSIS — Z86.718 PERSONAL HISTORY OF OTHER VENOUS THROMBOSIS AND EMBOLISM: ICD-10-CM

## 2023-01-01 DIAGNOSIS — Z98.49 CATARACT EXTRACTION STATUS, UNSPECIFIED EYE: Chronic | ICD-10-CM

## 2023-01-01 DIAGNOSIS — Z97.4 PRESENCE OF EXTERNAL HEARING-AID: ICD-10-CM

## 2023-01-01 DIAGNOSIS — Z20.822 CONTACT WITH AND (SUSPECTED) EXPOSURE TO COVID-19: ICD-10-CM

## 2023-01-01 DIAGNOSIS — M19.90 UNSPECIFIED OSTEOARTHRITIS, UNSPECIFIED SITE: ICD-10-CM

## 2023-01-01 DIAGNOSIS — Z99.81 DEPENDENCE ON SUPPLEMENTAL OXYGEN: ICD-10-CM

## 2023-01-01 DIAGNOSIS — L57.0 ACTINIC KERATOSIS: ICD-10-CM

## 2023-01-01 DIAGNOSIS — H91.90 UNSPECIFIED HEARING LOSS, UNSPECIFIED EAR: ICD-10-CM

## 2023-01-01 DIAGNOSIS — J96.11 CHRONIC RESPIRATORY FAILURE WITH HYPOXIA: ICD-10-CM

## 2023-01-01 DIAGNOSIS — L82.1 OTHER SEBORRHEIC KERATOSIS: ICD-10-CM

## 2023-01-01 DIAGNOSIS — I21.4 NON-ST ELEVATION (NSTEMI) MYOCARDIAL INFARCTION: ICD-10-CM

## 2023-01-01 DIAGNOSIS — D22.9 MELANOCYTIC NEVI, UNSPECIFIED: ICD-10-CM

## 2023-01-01 DIAGNOSIS — Z85.828 PERSONAL HISTORY OF OTHER MALIGNANT NEOPLASM OF SKIN: ICD-10-CM

## 2023-01-01 DIAGNOSIS — I10 ESSENTIAL (PRIMARY) HYPERTENSION: ICD-10-CM

## 2023-01-01 DIAGNOSIS — L81.4 OTHER MELANIN HYPERPIGMENTATION: ICD-10-CM

## 2023-01-01 DIAGNOSIS — Z79.82 LONG TERM (CURRENT) USE OF ASPIRIN: ICD-10-CM

## 2023-01-01 DIAGNOSIS — J44.0 CHRONIC OBSTRUCTIVE PULMONARY DISEASE WITH (ACUTE) LOWER RESPIRATORY INFECTION: ICD-10-CM

## 2023-01-01 DIAGNOSIS — J98.11 ATELECTASIS: ICD-10-CM

## 2023-01-01 DIAGNOSIS — J84.10 PULMONARY FIBROSIS, UNSPECIFIED: ICD-10-CM

## 2023-01-01 DIAGNOSIS — I25.110 ATHEROSCLEROTIC HEART DISEASE OF NATIVE CORONARY ARTERY WITH UNSTABLE ANGINA PECTORIS: ICD-10-CM

## 2023-01-01 DIAGNOSIS — J44.9 CHRONIC OBSTRUCTIVE PULMONARY DISEASE, UNSPECIFIED: ICD-10-CM

## 2023-01-01 DIAGNOSIS — Z79.51 LONG TERM (CURRENT) USE OF INHALED STEROIDS: ICD-10-CM

## 2023-01-01 LAB
ALBUMIN SERPL ELPH-MCNC: 2.9 G/DL — LOW (ref 3.3–5)
ALBUMIN SERPL ELPH-MCNC: 3.1 G/DL — LOW (ref 3.3–5)
ALP SERPL-CCNC: 104 U/L — SIGNIFICANT CHANGE UP (ref 40–120)
ALP SERPL-CCNC: 82 U/L — SIGNIFICANT CHANGE UP (ref 40–120)
ALT FLD-CCNC: 26 U/L — SIGNIFICANT CHANGE UP (ref 12–78)
ALT FLD-CCNC: 27 U/L — SIGNIFICANT CHANGE UP (ref 12–78)
ANION GAP SERPL CALC-SCNC: 10 MMOL/L — SIGNIFICANT CHANGE UP (ref 5–17)
ANION GAP SERPL CALC-SCNC: 4 MMOL/L — LOW (ref 5–17)
ANION GAP SERPL CALC-SCNC: 8 MMOL/L — SIGNIFICANT CHANGE UP (ref 5–17)
ANION GAP SERPL CALC-SCNC: 9 MMOL/L — SIGNIFICANT CHANGE UP (ref 5–17)
ANION GAP SERPL CALC-SCNC: 9 MMOL/L — SIGNIFICANT CHANGE UP (ref 5–17)
APPEARANCE UR: ABNORMAL
APPEARANCE UR: CLEAR — SIGNIFICANT CHANGE UP
APTT BLD: 31.8 SEC — SIGNIFICANT CHANGE UP (ref 24.5–35.6)
APTT BLD: 32.1 SEC — SIGNIFICANT CHANGE UP (ref 27.5–35.5)
APTT BLD: 45.4 SEC — HIGH (ref 24.5–35.6)
APTT BLD: 51.6 SEC — HIGH (ref 24.5–35.6)
APTT BLD: 55.6 SEC — HIGH (ref 24.5–35.6)
APTT BLD: 60.9 SEC — HIGH (ref 24.5–35.6)
APTT BLD: 62.6 SEC — HIGH (ref 24.5–35.6)
APTT BLD: 62.8 SEC — HIGH (ref 24.5–35.6)
APTT BLD: 66.5 SEC — HIGH (ref 24.5–35.6)
AST SERPL-CCNC: 20 U/L — SIGNIFICANT CHANGE UP (ref 15–37)
AST SERPL-CCNC: 28 U/L — SIGNIFICANT CHANGE UP (ref 15–37)
BACTERIA # UR AUTO: ABNORMAL
BACTERIA # UR AUTO: NEGATIVE — SIGNIFICANT CHANGE UP
BASOPHILS # BLD AUTO: 0.02 K/UL — SIGNIFICANT CHANGE UP (ref 0–0.2)
BASOPHILS # BLD AUTO: 0.03 K/UL — SIGNIFICANT CHANGE UP (ref 0–0.2)
BASOPHILS # BLD AUTO: 0.04 K/UL — SIGNIFICANT CHANGE UP (ref 0–0.2)
BASOPHILS NFR BLD AUTO: 0.2 % — SIGNIFICANT CHANGE UP (ref 0–2)
BASOPHILS NFR BLD AUTO: 0.4 % — SIGNIFICANT CHANGE UP (ref 0–2)
BASOPHILS NFR BLD AUTO: 0.6 % — SIGNIFICANT CHANGE UP (ref 0–2)
BILIRUB SERPL-MCNC: 0.4 MG/DL — SIGNIFICANT CHANGE UP (ref 0.2–1.2)
BILIRUB SERPL-MCNC: 1.2 MG/DL — SIGNIFICANT CHANGE UP (ref 0.2–1.2)
BILIRUB UR-MCNC: NEGATIVE — SIGNIFICANT CHANGE UP
BILIRUB UR-MCNC: NEGATIVE — SIGNIFICANT CHANGE UP
BUN SERPL-MCNC: 23 MG/DL — SIGNIFICANT CHANGE UP (ref 7–23)
BUN SERPL-MCNC: 24 MG/DL — HIGH (ref 7–23)
BUN SERPL-MCNC: 25 MG/DL — HIGH (ref 7–23)
BUN SERPL-MCNC: 27 MG/DL — HIGH (ref 7–23)
BUN SERPL-MCNC: 27 MG/DL — HIGH (ref 7–23)
CALCIUM SERPL-MCNC: 8.1 MG/DL — LOW (ref 8.5–10.1)
CALCIUM SERPL-MCNC: 8.2 MG/DL — LOW (ref 8.5–10.1)
CALCIUM SERPL-MCNC: 8.4 MG/DL — LOW (ref 8.5–10.1)
CALCIUM SERPL-MCNC: 8.6 MG/DL — SIGNIFICANT CHANGE UP (ref 8.4–10.5)
CALCIUM SERPL-MCNC: 8.8 MG/DL — SIGNIFICANT CHANGE UP (ref 8.5–10.1)
CHLORIDE SERPL-SCNC: 104 MMOL/L — SIGNIFICANT CHANGE UP (ref 96–108)
CHLORIDE SERPL-SCNC: 106 MMOL/L — SIGNIFICANT CHANGE UP (ref 96–108)
CHLORIDE SERPL-SCNC: 106 MMOL/L — SIGNIFICANT CHANGE UP (ref 96–108)
CHLORIDE SERPL-SCNC: 107 MMOL/L — SIGNIFICANT CHANGE UP (ref 96–108)
CHLORIDE SERPL-SCNC: 109 MMOL/L — HIGH (ref 96–108)
CO2 SERPL-SCNC: 20 MMOL/L — LOW (ref 22–31)
CO2 SERPL-SCNC: 21 MMOL/L — LOW (ref 22–31)
CO2 SERPL-SCNC: 21 MMOL/L — LOW (ref 22–31)
CO2 SERPL-SCNC: 22 MMOL/L — SIGNIFICANT CHANGE UP (ref 22–31)
CO2 SERPL-SCNC: 24 MMOL/L — SIGNIFICANT CHANGE UP (ref 22–31)
COLOR SPEC: YELLOW — SIGNIFICANT CHANGE UP
COLOR SPEC: YELLOW — SIGNIFICANT CHANGE UP
CREAT SERPL-MCNC: 0.95 MG/DL — SIGNIFICANT CHANGE UP (ref 0.5–1.3)
CREAT SERPL-MCNC: 1 MG/DL — SIGNIFICANT CHANGE UP (ref 0.5–1.3)
CREAT SERPL-MCNC: 1.02 MG/DL — SIGNIFICANT CHANGE UP (ref 0.5–1.3)
CREAT SERPL-MCNC: 1.12 MG/DL — SIGNIFICANT CHANGE UP (ref 0.5–1.3)
CREAT SERPL-MCNC: 1.14 MG/DL — SIGNIFICANT CHANGE UP (ref 0.5–1.3)
CULTURE RESULTS: NO GROWTH — SIGNIFICANT CHANGE UP
CULTURE RESULTS: SIGNIFICANT CHANGE UP
DIFF PNL FLD: ABNORMAL
DIFF PNL FLD: ABNORMAL
EGFR: 62 ML/MIN/1.73M2 — SIGNIFICANT CHANGE UP
EGFR: 64 ML/MIN/1.73M2 — SIGNIFICANT CHANGE UP
EGFR: 71 ML/MIN/1.73M2 — SIGNIFICANT CHANGE UP
EGFR: 73 ML/MIN/1.73M2 — SIGNIFICANT CHANGE UP
EGFR: 77 ML/MIN/1.73M2 — SIGNIFICANT CHANGE UP
EOSINOPHIL # BLD AUTO: 0 K/UL — SIGNIFICANT CHANGE UP (ref 0–0.5)
EOSINOPHIL # BLD AUTO: 0.05 K/UL — SIGNIFICANT CHANGE UP (ref 0–0.5)
EOSINOPHIL # BLD AUTO: 0.18 K/UL — SIGNIFICANT CHANGE UP (ref 0–0.5)
EOSINOPHIL NFR BLD AUTO: 0 % — SIGNIFICANT CHANGE UP (ref 0–6)
EOSINOPHIL NFR BLD AUTO: 0.6 % — SIGNIFICANT CHANGE UP (ref 0–6)
EOSINOPHIL NFR BLD AUTO: 2.8 % — SIGNIFICANT CHANGE UP (ref 0–6)
EPI CELLS # UR: NEGATIVE — SIGNIFICANT CHANGE UP
EPI CELLS # UR: SIGNIFICANT CHANGE UP
GLUCOSE SERPL-MCNC: 108 MG/DL — HIGH (ref 70–99)
GLUCOSE SERPL-MCNC: 114 MG/DL — HIGH (ref 70–99)
GLUCOSE SERPL-MCNC: 122 MG/DL — HIGH (ref 70–99)
GLUCOSE SERPL-MCNC: 141 MG/DL — HIGH (ref 70–99)
GLUCOSE SERPL-MCNC: 207 MG/DL — HIGH (ref 70–99)
GLUCOSE UR QL: NEGATIVE — SIGNIFICANT CHANGE UP
GLUCOSE UR QL: NEGATIVE — SIGNIFICANT CHANGE UP
GRAN CASTS # UR COMP ASSIST: ABNORMAL /LPF
HCT VFR BLD CALC: 32.8 % — LOW (ref 39–50)
HCT VFR BLD CALC: 35.1 % — LOW (ref 39–50)
HCT VFR BLD CALC: 35.1 % — LOW (ref 39–50)
HCT VFR BLD CALC: 35.3 % — LOW (ref 39–50)
HCT VFR BLD CALC: 36.2 % — LOW (ref 39–50)
HCT VFR BLD CALC: 37.8 % — LOW (ref 39–50)
HCT VFR BLD CALC: 38.7 % — LOW (ref 39–50)
HCT VFR BLD CALC: 39.1 % — SIGNIFICANT CHANGE UP (ref 39–50)
HCT VFR BLD CALC: 40 % — SIGNIFICANT CHANGE UP (ref 39–50)
HGB BLD-MCNC: 11.2 G/DL — LOW (ref 13–17)
HGB BLD-MCNC: 11.5 G/DL — LOW (ref 13–17)
HGB BLD-MCNC: 11.5 G/DL — LOW (ref 13–17)
HGB BLD-MCNC: 11.6 G/DL — LOW (ref 13–17)
HGB BLD-MCNC: 11.7 G/DL — LOW (ref 13–17)
HGB BLD-MCNC: 12.6 G/DL — LOW (ref 13–17)
HGB BLD-MCNC: 12.8 G/DL — LOW (ref 13–17)
HGB BLD-MCNC: 12.8 G/DL — LOW (ref 13–17)
HGB BLD-MCNC: 13.1 G/DL — SIGNIFICANT CHANGE UP (ref 13–17)
IMM GRANULOCYTES NFR BLD AUTO: 0.3 % — SIGNIFICANT CHANGE UP (ref 0–0.9)
IMM GRANULOCYTES NFR BLD AUTO: 0.4 % — SIGNIFICANT CHANGE UP (ref 0–0.9)
IMM GRANULOCYTES NFR BLD AUTO: 0.5 % — SIGNIFICANT CHANGE UP (ref 0–0.9)
INR BLD: 1.13 RATIO — SIGNIFICANT CHANGE UP (ref 0.85–1.18)
INR BLD: 1.38 RATIO — HIGH (ref 0.88–1.16)
KETONES UR-MCNC: ABNORMAL
KETONES UR-MCNC: NEGATIVE — SIGNIFICANT CHANGE UP
LACTATE SERPL-SCNC: 1.5 MMOL/L — SIGNIFICANT CHANGE UP (ref 0.7–2)
LEUKOCYTE ESTERASE UR-ACNC: NEGATIVE — SIGNIFICANT CHANGE UP
LEUKOCYTE ESTERASE UR-ACNC: NEGATIVE — SIGNIFICANT CHANGE UP
LYMPHOCYTES # BLD AUTO: 0.74 K/UL — LOW (ref 1–3.3)
LYMPHOCYTES # BLD AUTO: 0.96 K/UL — LOW (ref 1–3.3)
LYMPHOCYTES # BLD AUTO: 1.09 K/UL — SIGNIFICANT CHANGE UP (ref 1–3.3)
LYMPHOCYTES # BLD AUTO: 16.8 % — SIGNIFICANT CHANGE UP (ref 13–44)
LYMPHOCYTES # BLD AUTO: 8.4 % — LOW (ref 13–44)
LYMPHOCYTES # BLD AUTO: 9.6 % — LOW (ref 13–44)
MAGNESIUM SERPL-MCNC: 2.2 MG/DL — SIGNIFICANT CHANGE UP (ref 1.6–2.6)
MAGNESIUM SERPL-MCNC: 2.2 MG/DL — SIGNIFICANT CHANGE UP (ref 1.6–2.6)
MAGNESIUM SERPL-MCNC: 2.4 MG/DL — SIGNIFICANT CHANGE UP (ref 1.6–2.6)
MAGNESIUM SERPL-MCNC: 2.5 MG/DL — SIGNIFICANT CHANGE UP (ref 1.6–2.6)
MCHC RBC-ENTMCNC: 28 PG — SIGNIFICANT CHANGE UP (ref 27–34)
MCHC RBC-ENTMCNC: 28 PG — SIGNIFICANT CHANGE UP (ref 27–34)
MCHC RBC-ENTMCNC: 28.6 PG — SIGNIFICANT CHANGE UP (ref 27–34)
MCHC RBC-ENTMCNC: 28.6 PG — SIGNIFICANT CHANGE UP (ref 27–34)
MCHC RBC-ENTMCNC: 28.7 PG — SIGNIFICANT CHANGE UP (ref 27–34)
MCHC RBC-ENTMCNC: 28.7 PG — SIGNIFICANT CHANGE UP (ref 27–34)
MCHC RBC-ENTMCNC: 28.8 PG — SIGNIFICANT CHANGE UP (ref 27–34)
MCHC RBC-ENTMCNC: 29 PG — SIGNIFICANT CHANGE UP (ref 27–34)
MCHC RBC-ENTMCNC: 29.4 PG — SIGNIFICANT CHANGE UP (ref 27–34)
MCHC RBC-ENTMCNC: 32 GM/DL — SIGNIFICANT CHANGE UP (ref 32–36)
MCHC RBC-ENTMCNC: 32.6 GM/DL — SIGNIFICANT CHANGE UP (ref 32–36)
MCHC RBC-ENTMCNC: 32.7 GM/DL — SIGNIFICANT CHANGE UP (ref 32–36)
MCHC RBC-ENTMCNC: 32.8 GM/DL — SIGNIFICANT CHANGE UP (ref 32–36)
MCHC RBC-ENTMCNC: 32.8 GM/DL — SIGNIFICANT CHANGE UP (ref 32–36)
MCHC RBC-ENTMCNC: 33.1 GM/DL — SIGNIFICANT CHANGE UP (ref 32–36)
MCHC RBC-ENTMCNC: 33.3 GM/DL — SIGNIFICANT CHANGE UP (ref 32–36)
MCHC RBC-ENTMCNC: 33.3 GM/DL — SIGNIFICANT CHANGE UP (ref 32–36)
MCHC RBC-ENTMCNC: 34.1 GM/DL — SIGNIFICANT CHANGE UP (ref 32–36)
MCV RBC AUTO: 86 FL — SIGNIFICANT CHANGE UP (ref 80–100)
MCV RBC AUTO: 86.1 FL — SIGNIFICANT CHANGE UP (ref 80–100)
MCV RBC AUTO: 86.1 FL — SIGNIFICANT CHANGE UP (ref 80–100)
MCV RBC AUTO: 86.3 FL — SIGNIFICANT CHANGE UP (ref 80–100)
MCV RBC AUTO: 86.8 FL — SIGNIFICANT CHANGE UP (ref 80–100)
MCV RBC AUTO: 87.2 FL — SIGNIFICANT CHANGE UP (ref 80–100)
MCV RBC AUTO: 87.3 FL — SIGNIFICANT CHANGE UP (ref 80–100)
MCV RBC AUTO: 87.3 FL — SIGNIFICANT CHANGE UP (ref 80–100)
MCV RBC AUTO: 88.5 FL — SIGNIFICANT CHANGE UP (ref 80–100)
MONOCYTES # BLD AUTO: 0.37 K/UL — SIGNIFICANT CHANGE UP (ref 0–0.9)
MONOCYTES # BLD AUTO: 0.42 K/UL — SIGNIFICANT CHANGE UP (ref 0–0.9)
MONOCYTES # BLD AUTO: 0.98 K/UL — HIGH (ref 0–0.9)
MONOCYTES NFR BLD AUTO: 5.4 % — SIGNIFICANT CHANGE UP (ref 2–14)
MONOCYTES NFR BLD AUTO: 5.7 % — SIGNIFICANT CHANGE UP (ref 2–14)
MONOCYTES NFR BLD AUTO: 8.5 % — SIGNIFICANT CHANGE UP (ref 2–14)
NEUTROPHILS # BLD AUTO: 4.76 K/UL — SIGNIFICANT CHANGE UP (ref 1.8–7.4)
NEUTROPHILS # BLD AUTO: 6.48 K/UL — SIGNIFICANT CHANGE UP (ref 1.8–7.4)
NEUTROPHILS # BLD AUTO: 9.47 K/UL — HIGH (ref 1.8–7.4)
NEUTROPHILS NFR BLD AUTO: 73.6 % — SIGNIFICANT CHANGE UP (ref 43–77)
NEUTROPHILS NFR BLD AUTO: 82.5 % — HIGH (ref 43–77)
NEUTROPHILS NFR BLD AUTO: 83.7 % — HIGH (ref 43–77)
NITRITE UR-MCNC: NEGATIVE — SIGNIFICANT CHANGE UP
NITRITE UR-MCNC: NEGATIVE — SIGNIFICANT CHANGE UP
NRBC # BLD: 0 /100 WBCS — SIGNIFICANT CHANGE UP (ref 0–0)
NRBC # BLD: 0 /100 WBCS — SIGNIFICANT CHANGE UP (ref 0–0)
NT-PROBNP SERPL-SCNC: 3038 PG/ML — HIGH (ref 0–450)
PH UR: 5 — SIGNIFICANT CHANGE UP (ref 5–8)
PH UR: 5 — SIGNIFICANT CHANGE UP (ref 5–8)
PHOSPHATE SERPL-MCNC: 3.2 MG/DL — SIGNIFICANT CHANGE UP (ref 2.5–4.5)
PLATELET # BLD AUTO: 111 K/UL — LOW (ref 150–400)
PLATELET # BLD AUTO: 118 K/UL — LOW (ref 150–400)
PLATELET # BLD AUTO: 130 K/UL — LOW (ref 150–400)
PLATELET # BLD AUTO: 170 K/UL — SIGNIFICANT CHANGE UP (ref 150–400)
PLATELET # BLD AUTO: 173 K/UL — SIGNIFICANT CHANGE UP (ref 150–400)
PLATELET # BLD AUTO: 182 K/UL — SIGNIFICANT CHANGE UP (ref 150–400)
PLATELET # BLD AUTO: 188 K/UL — SIGNIFICANT CHANGE UP (ref 150–400)
PLATELET # BLD AUTO: 205 K/UL — SIGNIFICANT CHANGE UP (ref 150–400)
PLATELET # BLD AUTO: 209 K/UL — SIGNIFICANT CHANGE UP (ref 150–400)
POTASSIUM SERPL-MCNC: 3.4 MMOL/L — LOW (ref 3.5–5.3)
POTASSIUM SERPL-MCNC: 3.5 MMOL/L — SIGNIFICANT CHANGE UP (ref 3.5–5.3)
POTASSIUM SERPL-MCNC: 3.6 MMOL/L — SIGNIFICANT CHANGE UP (ref 3.5–5.3)
POTASSIUM SERPL-MCNC: 4.1 MMOL/L — SIGNIFICANT CHANGE UP (ref 3.5–5.3)
POTASSIUM SERPL-MCNC: 4.2 MMOL/L — SIGNIFICANT CHANGE UP (ref 3.5–5.3)
POTASSIUM SERPL-SCNC: 3.4 MMOL/L — LOW (ref 3.5–5.3)
POTASSIUM SERPL-SCNC: 3.5 MMOL/L — SIGNIFICANT CHANGE UP (ref 3.5–5.3)
POTASSIUM SERPL-SCNC: 3.6 MMOL/L — SIGNIFICANT CHANGE UP (ref 3.5–5.3)
POTASSIUM SERPL-SCNC: 4.1 MMOL/L — SIGNIFICANT CHANGE UP (ref 3.5–5.3)
POTASSIUM SERPL-SCNC: 4.2 MMOL/L — SIGNIFICANT CHANGE UP (ref 3.5–5.3)
PROT SERPL-MCNC: 6.1 GM/DL — SIGNIFICANT CHANGE UP (ref 6–8.3)
PROT SERPL-MCNC: 6.8 GM/DL — SIGNIFICANT CHANGE UP (ref 6–8.3)
PROT UR-MCNC: 15
PROT UR-MCNC: 30 MG/DL
PROTHROM AB SERPL-ACNC: 12.7 SEC — SIGNIFICANT CHANGE UP (ref 9.5–13)
PROTHROM AB SERPL-ACNC: 16.1 SEC — HIGH (ref 10.5–13.4)
RAPID RVP RESULT: SIGNIFICANT CHANGE UP
RAPID RVP RESULT: SIGNIFICANT CHANGE UP
RBC # BLD: 3.81 M/UL — LOW (ref 4.2–5.8)
RBC # BLD: 4.02 M/UL — LOW (ref 4.2–5.8)
RBC # BLD: 4.08 M/UL — LOW (ref 4.2–5.8)
RBC # BLD: 4.1 M/UL — LOW (ref 4.2–5.8)
RBC # BLD: 4.15 M/UL — LOW (ref 4.2–5.8)
RBC # BLD: 4.38 M/UL — SIGNIFICANT CHANGE UP (ref 4.2–5.8)
RBC # BLD: 4.46 M/UL — SIGNIFICANT CHANGE UP (ref 4.2–5.8)
RBC # BLD: 4.48 M/UL — SIGNIFICANT CHANGE UP (ref 4.2–5.8)
RBC # BLD: 4.52 M/UL — SIGNIFICANT CHANGE UP (ref 4.2–5.8)
RBC # FLD: 13.6 % — SIGNIFICANT CHANGE UP (ref 10.3–14.5)
RBC # FLD: 13.8 % — SIGNIFICANT CHANGE UP (ref 10.3–14.5)
RBC # FLD: 13.9 % — SIGNIFICANT CHANGE UP (ref 10.3–14.5)
RBC # FLD: 14.3 % — SIGNIFICANT CHANGE UP (ref 10.3–14.5)
RBC # FLD: 14.4 % — SIGNIFICANT CHANGE UP (ref 10.3–14.5)
RBC # FLD: 14.5 % — SIGNIFICANT CHANGE UP (ref 10.3–14.5)
RBC # FLD: 14.6 % — HIGH (ref 10.3–14.5)
RBC CASTS # UR COMP ASSIST: ABNORMAL /HPF (ref 0–4)
RBC CASTS # UR COMP ASSIST: SIGNIFICANT CHANGE UP /HPF (ref 0–4)
SARS-COV-2 RNA SPEC QL NAA+PROBE: SIGNIFICANT CHANGE UP
SARS-COV-2 RNA SPEC QL NAA+PROBE: SIGNIFICANT CHANGE UP
SODIUM SERPL-SCNC: 134 MMOL/L — LOW (ref 135–145)
SODIUM SERPL-SCNC: 134 MMOL/L — LOW (ref 135–145)
SODIUM SERPL-SCNC: 137 MMOL/L — SIGNIFICANT CHANGE UP (ref 135–145)
SODIUM SERPL-SCNC: 137 MMOL/L — SIGNIFICANT CHANGE UP (ref 135–145)
SODIUM SERPL-SCNC: 138 MMOL/L — SIGNIFICANT CHANGE UP (ref 135–145)
SP GR SPEC: 1 — LOW (ref 1.01–1.02)
SP GR SPEC: 1.01 — SIGNIFICANT CHANGE UP (ref 1.01–1.02)
SPECIMEN SOURCE: SIGNIFICANT CHANGE UP
TROPONIN I, HIGH SENSITIVITY RESULT: 1038.86 NG/L — HIGH
TROPONIN I, HIGH SENSITIVITY RESULT: 392.77 NG/L — HIGH
TROPONIN I, HIGH SENSITIVITY RESULT: 612.59 NG/L — HIGH
UROBILINOGEN FLD QL: NEGATIVE — SIGNIFICANT CHANGE UP
UROBILINOGEN FLD QL: NEGATIVE — SIGNIFICANT CHANGE UP
WBC # BLD: 11.48 K/UL — HIGH (ref 3.8–10.5)
WBC # BLD: 3.88 K/UL — SIGNIFICANT CHANGE UP (ref 3.8–10.5)
WBC # BLD: 5.7 K/UL — SIGNIFICANT CHANGE UP (ref 3.8–10.5)
WBC # BLD: 6.28 K/UL — SIGNIFICANT CHANGE UP (ref 3.8–10.5)
WBC # BLD: 6.47 K/UL — SIGNIFICANT CHANGE UP (ref 3.8–10.5)
WBC # BLD: 7.03 K/UL — SIGNIFICANT CHANGE UP (ref 3.8–10.5)
WBC # BLD: 7.66 K/UL — SIGNIFICANT CHANGE UP (ref 3.8–10.5)
WBC # BLD: 7.74 K/UL — SIGNIFICANT CHANGE UP (ref 3.8–10.5)
WBC # BLD: 8.72 K/UL — SIGNIFICANT CHANGE UP (ref 3.8–10.5)
WBC # FLD AUTO: 11.48 K/UL — HIGH (ref 3.8–10.5)
WBC # FLD AUTO: 3.88 K/UL — SIGNIFICANT CHANGE UP (ref 3.8–10.5)
WBC # FLD AUTO: 5.7 K/UL — SIGNIFICANT CHANGE UP (ref 3.8–10.5)
WBC # FLD AUTO: 6.28 K/UL — SIGNIFICANT CHANGE UP (ref 3.8–10.5)
WBC # FLD AUTO: 6.47 K/UL — SIGNIFICANT CHANGE UP (ref 3.8–10.5)
WBC # FLD AUTO: 7.03 K/UL — SIGNIFICANT CHANGE UP (ref 3.8–10.5)
WBC # FLD AUTO: 7.66 K/UL — SIGNIFICANT CHANGE UP (ref 3.8–10.5)
WBC # FLD AUTO: 7.74 K/UL — SIGNIFICANT CHANGE UP (ref 3.8–10.5)
WBC # FLD AUTO: 8.72 K/UL — SIGNIFICANT CHANGE UP (ref 3.8–10.5)
WBC UR QL: SIGNIFICANT CHANGE UP /HPF (ref 0–5)
WBC UR QL: SIGNIFICANT CHANGE UP /HPF (ref 0–5)

## 2023-01-01 PROCEDURE — C8929: CPT

## 2023-01-01 PROCEDURE — 83735 ASSAY OF MAGNESIUM: CPT

## 2023-01-01 PROCEDURE — 97116 GAIT TRAINING THERAPY: CPT | Mod: GP

## 2023-01-01 PROCEDURE — 85025 COMPLETE CBC W/AUTO DIFF WBC: CPT

## 2023-01-01 PROCEDURE — C1724: CPT

## 2023-01-01 PROCEDURE — 94640 AIRWAY INHALATION TREATMENT: CPT

## 2023-01-01 PROCEDURE — 99213 OFFICE O/P EST LOW 20 MIN: CPT

## 2023-01-01 PROCEDURE — C9600: CPT | Mod: LC

## 2023-01-01 PROCEDURE — 71045 X-RAY EXAM CHEST 1 VIEW: CPT | Mod: 26

## 2023-01-01 PROCEDURE — 85027 COMPLETE CBC AUTOMATED: CPT

## 2023-01-01 PROCEDURE — C1894: CPT

## 2023-01-01 PROCEDURE — 71275 CT ANGIOGRAPHY CHEST: CPT | Mod: 26,MA

## 2023-01-01 PROCEDURE — C1769: CPT

## 2023-01-01 PROCEDURE — 97530 THERAPEUTIC ACTIVITIES: CPT | Mod: GP

## 2023-01-01 PROCEDURE — 71250 CT THORAX DX C-: CPT

## 2023-01-01 PROCEDURE — 93010 ELECTROCARDIOGRAM REPORT: CPT

## 2023-01-01 PROCEDURE — 93005 ELECTROCARDIOGRAM TRACING: CPT

## 2023-01-01 PROCEDURE — 99239 HOSP IP/OBS DSCHRG MGMT >30: CPT

## 2023-01-01 PROCEDURE — 99213 OFFICE O/P EST LOW 20 MIN: CPT | Mod: 25

## 2023-01-01 PROCEDURE — 93306 TTE W/DOPPLER COMPLETE: CPT | Mod: 26

## 2023-01-01 PROCEDURE — C9602: CPT | Mod: LD

## 2023-01-01 PROCEDURE — 99285 EMERGENCY DEPT VISIT HI MDM: CPT

## 2023-01-01 PROCEDURE — 99232 SBSQ HOSP IP/OBS MODERATE 35: CPT

## 2023-01-01 PROCEDURE — 71250 CT THORAX DX C-: CPT | Mod: 26

## 2023-01-01 PROCEDURE — C1874: CPT

## 2023-01-01 PROCEDURE — 17003 DESTRUCT PREMALG LES 2-14: CPT

## 2023-01-01 PROCEDURE — 99223 1ST HOSP IP/OBS HIGH 75: CPT

## 2023-01-01 PROCEDURE — 97161 PT EVAL LOW COMPLEX 20 MIN: CPT

## 2023-01-01 PROCEDURE — C1887: CPT

## 2023-01-01 PROCEDURE — C1725: CPT

## 2023-01-01 PROCEDURE — 85730 THROMBOPLASTIN TIME PARTIAL: CPT

## 2023-01-01 PROCEDURE — 17000 DESTRUCT PREMALG LESION: CPT

## 2023-01-01 PROCEDURE — 80048 BASIC METABOLIC PNL TOTAL CA: CPT

## 2023-01-01 PROCEDURE — 93458 L HRT ARTERY/VENTRICLE ANGIO: CPT

## 2023-01-01 PROCEDURE — 81001 URINALYSIS AUTO W/SCOPE: CPT

## 2023-01-01 PROCEDURE — 84100 ASSAY OF PHOSPHORUS: CPT

## 2023-01-01 PROCEDURE — 99204 OFFICE O/P NEW MOD 45 MIN: CPT | Mod: 95

## 2023-01-01 PROCEDURE — 99233 SBSQ HOSP IP/OBS HIGH 50: CPT

## 2023-01-01 PROCEDURE — 99238 HOSP IP/OBS DSCHRG MGMT 30/<: CPT

## 2023-01-01 PROCEDURE — 87086 URINE CULTURE/COLONY COUNT: CPT

## 2023-01-01 PROCEDURE — 92933 PRQ TRLML C ATHRC ST ANGIOP1: CPT | Mod: LD

## 2023-01-01 PROCEDURE — 71045 X-RAY EXAM CHEST 1 VIEW: CPT

## 2023-01-01 PROCEDURE — 99152 MOD SED SAME PHYS/QHP 5/>YRS: CPT

## 2023-01-01 PROCEDURE — 84484 ASSAY OF TROPONIN QUANT: CPT

## 2023-01-01 PROCEDURE — 92928 PRQ TCAT PLMT NTRAC ST 1 LES: CPT | Mod: LC

## 2023-01-01 PROCEDURE — 36415 COLL VENOUS BLD VENIPUNCTURE: CPT

## 2023-01-01 PROCEDURE — 97162 PT EVAL MOD COMPLEX 30 MIN: CPT | Mod: GP

## 2023-01-01 RX ORDER — ASPIRIN/CALCIUM CARB/MAGNESIUM 324 MG
81 TABLET ORAL DAILY
Refills: 0 | Status: DISCONTINUED | OUTPATIENT
Start: 2023-01-01 | End: 2023-01-01

## 2023-01-01 RX ORDER — ENOXAPARIN SODIUM 100 MG/ML
40 INJECTION SUBCUTANEOUS EVERY 24 HOURS
Refills: 0 | Status: DISCONTINUED | OUTPATIENT
Start: 2023-01-01 | End: 2023-01-01

## 2023-01-01 RX ORDER — SODIUM CHLORIDE 9 MG/ML
250 INJECTION INTRAMUSCULAR; INTRAVENOUS; SUBCUTANEOUS ONCE
Refills: 0 | Status: COMPLETED | OUTPATIENT
Start: 2023-01-01 | End: 2023-01-01

## 2023-01-01 RX ORDER — AZITHROMYCIN 500 MG/1
500 TABLET, FILM COATED ORAL ONCE
Refills: 0 | Status: COMPLETED | OUTPATIENT
Start: 2023-01-01 | End: 2023-01-01

## 2023-01-01 RX ORDER — CEFTRIAXONE 500 MG/1
1000 INJECTION, POWDER, FOR SOLUTION INTRAMUSCULAR; INTRAVENOUS ONCE
Refills: 0 | Status: COMPLETED | OUTPATIENT
Start: 2023-01-01 | End: 2023-01-01

## 2023-01-01 RX ORDER — BUDESONIDE AND FORMOTEROL FUMARATE DIHYDRATE 160; 4.5 UG/1; UG/1
2 AEROSOL RESPIRATORY (INHALATION)
Refills: 0 | Status: DISCONTINUED | OUTPATIENT
Start: 2023-01-01 | End: 2023-01-01

## 2023-01-01 RX ORDER — FAMOTIDINE 10 MG/ML
20 INJECTION INTRAVENOUS
Refills: 0 | Status: DISCONTINUED | OUTPATIENT
Start: 2023-01-01 | End: 2023-01-01

## 2023-01-01 RX ORDER — ASPIRIN/CALCIUM CARB/MAGNESIUM 324 MG
1 TABLET ORAL
Refills: 0 | DISCHARGE

## 2023-01-01 RX ORDER — PANTOPRAZOLE SODIUM 20 MG/1
40 TABLET, DELAYED RELEASE ORAL
Refills: 0 | Status: DISCONTINUED | OUTPATIENT
Start: 2023-01-01 | End: 2023-01-01

## 2023-01-01 RX ORDER — CEFTRIAXONE 500 MG/1
1000 INJECTION, POWDER, FOR SOLUTION INTRAMUSCULAR; INTRAVENOUS ONCE
Refills: 0 | Status: DISCONTINUED | OUTPATIENT
Start: 2023-01-01 | End: 2023-01-01

## 2023-01-01 RX ORDER — POTASSIUM CHLORIDE 20 MEQ
40 PACKET (EA) ORAL ONCE
Refills: 0 | Status: COMPLETED | OUTPATIENT
Start: 2023-01-01 | End: 2023-01-01

## 2023-01-01 RX ORDER — ATORVASTATIN CALCIUM 80 MG/1
1 TABLET, FILM COATED ORAL
Refills: 0 | DISCHARGE

## 2023-01-01 RX ORDER — PREDNISONE 20 MG/1
20 TABLET ORAL
Qty: 15 | Refills: 0 | Status: ACTIVE | COMMUNITY
Start: 2023-01-01 | End: 1900-01-01

## 2023-01-01 RX ORDER — ATORVASTATIN CALCIUM 80 MG/1
10 TABLET, FILM COATED ORAL AT BEDTIME
Refills: 0 | Status: DISCONTINUED | OUTPATIENT
Start: 2023-01-01 | End: 2023-01-01

## 2023-01-01 RX ORDER — OMEGA-3 ACID ETHYL ESTERS 1 G
1 CAPSULE ORAL
Refills: 0 | DISCHARGE

## 2023-01-01 RX ORDER — ALBUTEROL 90 UG/1
2.5 AEROSOL, METERED ORAL EVERY 6 HOURS
Refills: 0 | Status: DISCONTINUED | OUTPATIENT
Start: 2023-01-01 | End: 2023-01-01

## 2023-01-01 RX ORDER — CLOPIDOGREL BISULFATE 75 MG/1
300 TABLET, FILM COATED ORAL ONCE
Refills: 0 | Status: COMPLETED | OUTPATIENT
Start: 2023-01-01 | End: 2023-01-01

## 2023-01-01 RX ORDER — NIRMATRELVIR AND RITONAVIR 300-100 MG
20 X 150 MG & KIT ORAL
Qty: 30 | Refills: 0 | Status: ACTIVE | COMMUNITY
Start: 2023-01-01 | End: 1900-01-01

## 2023-01-01 RX ORDER — ASPIRIN/CALCIUM CARB/MAGNESIUM 324 MG
1 TABLET ORAL
Qty: 0 | Refills: 0 | DISCHARGE
Start: 2023-01-01

## 2023-01-01 RX ORDER — OMEGA-3 ACID ETHYL ESTERS 1 G
1 CAPSULE ORAL
Qty: 0 | Refills: 0 | DISCHARGE

## 2023-01-01 RX ORDER — AZITHROMYCIN 500 MG/1
500 TABLET, FILM COATED ORAL EVERY 24 HOURS
Refills: 0 | Status: DISCONTINUED | OUTPATIENT
Start: 2023-01-01 | End: 2023-01-01

## 2023-01-01 RX ORDER — HEPARIN SODIUM 5000 [USP'U]/ML
3800 INJECTION INTRAVENOUS; SUBCUTANEOUS ONCE
Refills: 0 | Status: COMPLETED | OUTPATIENT
Start: 2023-01-01 | End: 2023-01-01

## 2023-01-01 RX ORDER — HEPARIN SODIUM 5000 [USP'U]/ML
3800 INJECTION INTRAVENOUS; SUBCUTANEOUS EVERY 6 HOURS
Refills: 0 | Status: DISCONTINUED | OUTPATIENT
Start: 2023-01-01 | End: 2023-01-01

## 2023-01-01 RX ORDER — HEPARIN SODIUM 5000 [USP'U]/ML
850 INJECTION INTRAVENOUS; SUBCUTANEOUS
Qty: 25000 | Refills: 0 | Status: DISCONTINUED | OUTPATIENT
Start: 2023-01-01 | End: 2023-01-01

## 2023-01-01 RX ORDER — METOPROLOL TARTRATE 50 MG
0.5 TABLET ORAL
Qty: 30 | Refills: 0
Start: 2023-01-01 | End: 2023-01-01

## 2023-01-01 RX ORDER — LANOLIN ALCOHOL/MO/W.PET/CERES
3 CREAM (GRAM) TOPICAL AT BEDTIME
Refills: 0 | Status: DISCONTINUED | OUTPATIENT
Start: 2023-01-01 | End: 2023-01-01

## 2023-01-01 RX ORDER — ATORVASTATIN CALCIUM 80 MG/1
1 TABLET, FILM COATED ORAL
Qty: 0 | Refills: 0 | DISCHARGE
Start: 2023-01-01

## 2023-01-01 RX ORDER — SODIUM CHLORIDE 9 MG/ML
1000 INJECTION INTRAMUSCULAR; INTRAVENOUS; SUBCUTANEOUS ONCE
Refills: 0 | Status: COMPLETED | OUTPATIENT
Start: 2023-01-01 | End: 2023-01-01

## 2023-01-01 RX ORDER — ONDANSETRON 8 MG/1
4 TABLET, FILM COATED ORAL EVERY 8 HOURS
Refills: 0 | Status: DISCONTINUED | OUTPATIENT
Start: 2023-01-01 | End: 2023-01-01

## 2023-01-01 RX ORDER — UBIDECARENONE 100 MG
1 CAPSULE ORAL
Qty: 0 | Refills: 0 | DISCHARGE

## 2023-01-01 RX ORDER — HEPARIN SODIUM 5000 [USP'U]/ML
INJECTION INTRAVENOUS; SUBCUTANEOUS
Qty: 25000 | Refills: 0 | Status: DISCONTINUED | OUTPATIENT
Start: 2023-01-01 | End: 2023-01-01

## 2023-01-01 RX ORDER — NITROGLYCERIN 6.5 MG
0.4 CAPSULE, EXTENDED RELEASE ORAL
Refills: 0 | Status: DISCONTINUED | OUTPATIENT
Start: 2023-01-01 | End: 2023-01-01

## 2023-01-01 RX ORDER — CLOPIDOGREL BISULFATE 75 MG/1
1 TABLET, FILM COATED ORAL
Qty: 90 | Refills: 3
Start: 2023-01-01 | End: 2024-09-20

## 2023-01-01 RX ORDER — ATORVASTATIN CALCIUM 80 MG/1
80 TABLET, FILM COATED ORAL AT BEDTIME
Refills: 0 | Status: DISCONTINUED | OUTPATIENT
Start: 2023-01-01 | End: 2023-01-01

## 2023-01-01 RX ORDER — METOPROLOL TARTRATE 50 MG
12.5 TABLET ORAL EVERY 12 HOURS
Refills: 0 | Status: DISCONTINUED | OUTPATIENT
Start: 2023-01-01 | End: 2023-01-01

## 2023-01-01 RX ORDER — METOPROLOL TARTRATE 50 MG
12.5 TABLET ORAL
Qty: 0 | Refills: 0 | DISCHARGE
Start: 2023-01-01

## 2023-01-01 RX ORDER — BUDESONIDE, MICRONIZED 100 %
0.5 POWDER (GRAM) MISCELLANEOUS DAILY
Refills: 0 | Status: DISCONTINUED | OUTPATIENT
Start: 2023-01-01 | End: 2023-01-01

## 2023-01-01 RX ORDER — CLOPIDOGREL BISULFATE 75 MG/1
75 TABLET, FILM COATED ORAL DAILY
Refills: 0 | Status: DISCONTINUED | OUTPATIENT
Start: 2023-01-01 | End: 2023-01-01

## 2023-01-01 RX ORDER — PANTOPRAZOLE SODIUM 20 MG/1
1 TABLET, DELAYED RELEASE ORAL
Qty: 42 | Refills: 0
Start: 2023-01-01 | End: 2023-01-01

## 2023-01-01 RX ORDER — ALBUTEROL 90 UG/1
2.5 AEROSOL, METERED ORAL
Refills: 0 | Status: DISCONTINUED | OUTPATIENT
Start: 2023-01-01 | End: 2023-01-01

## 2023-01-01 RX ORDER — FAMOTIDINE 10 MG/ML
1 INJECTION INTRAVENOUS
Qty: 0 | Refills: 0 | DISCHARGE
Start: 2023-01-01

## 2023-01-01 RX ORDER — HEPARIN SODIUM 5000 [USP'U]/ML
8.5 INJECTION INTRAVENOUS; SUBCUTANEOUS
Qty: 0 | Refills: 0 | DISCHARGE
Start: 2023-01-01

## 2023-01-01 RX ORDER — BUDESONIDE, MICRONIZED 100 %
2 POWDER (GRAM) MISCELLANEOUS
Qty: 0 | Refills: 0 | DISCHARGE

## 2023-01-01 RX ORDER — SODIUM CHLORIDE 9 MG/ML
1000 INJECTION INTRAMUSCULAR; INTRAVENOUS; SUBCUTANEOUS
Refills: 0 | Status: DISCONTINUED | OUTPATIENT
Start: 2023-01-01 | End: 2023-01-01

## 2023-01-01 RX ORDER — CEFUROXIME AXETIL 250 MG
1 TABLET ORAL
Qty: 10 | Refills: 0
Start: 2023-01-01 | End: 2023-01-01

## 2023-01-01 RX ORDER — ACETAMINOPHEN 500 MG
650 TABLET ORAL EVERY 6 HOURS
Refills: 0 | Status: DISCONTINUED | OUTPATIENT
Start: 2023-01-01 | End: 2023-01-01

## 2023-01-01 RX ORDER — ASPIRIN/CALCIUM CARB/MAGNESIUM 324 MG
1 TABLET ORAL
Qty: 0 | Refills: 0 | DISCHARGE

## 2023-01-01 RX ORDER — FUROSEMIDE 40 MG
40 TABLET ORAL ONCE
Refills: 0 | Status: COMPLETED | OUTPATIENT
Start: 2023-01-01 | End: 2023-01-01

## 2023-01-01 RX ORDER — CEFTRIAXONE 500 MG/1
1000 INJECTION, POWDER, FOR SOLUTION INTRAMUSCULAR; INTRAVENOUS EVERY 24 HOURS
Refills: 0 | Status: DISCONTINUED | OUTPATIENT
Start: 2023-01-01 | End: 2023-01-01

## 2023-01-01 RX ADMIN — ATORVASTATIN CALCIUM 80 MILLIGRAM(S): 80 TABLET, FILM COATED ORAL at 21:07

## 2023-01-01 RX ADMIN — BUDESONIDE AND FORMOTEROL FUMARATE DIHYDRATE 2 PUFF(S): 160; 4.5 AEROSOL RESPIRATORY (INHALATION) at 08:45

## 2023-01-01 RX ADMIN — CLOPIDOGREL BISULFATE 300 MILLIGRAM(S): 75 TABLET, FILM COATED ORAL at 10:18

## 2023-01-01 RX ADMIN — Medication 12.5 MILLIGRAM(S): at 20:56

## 2023-01-01 RX ADMIN — ALBUTEROL 2.5 MILLIGRAM(S): 90 AEROSOL, METERED ORAL at 01:33

## 2023-01-01 RX ADMIN — CLOPIDOGREL BISULFATE 75 MILLIGRAM(S): 75 TABLET, FILM COATED ORAL at 05:08

## 2023-01-01 RX ADMIN — ATORVASTATIN CALCIUM 80 MILLIGRAM(S): 80 TABLET, FILM COATED ORAL at 21:08

## 2023-01-01 RX ADMIN — BUDESONIDE AND FORMOTEROL FUMARATE DIHYDRATE 2 PUFF(S): 160; 4.5 AEROSOL RESPIRATORY (INHALATION) at 08:47

## 2023-01-01 RX ADMIN — ATORVASTATIN CALCIUM 10 MILLIGRAM(S): 80 TABLET, FILM COATED ORAL at 22:10

## 2023-01-01 RX ADMIN — ALBUTEROL 2.5 MILLIGRAM(S): 90 AEROSOL, METERED ORAL at 01:31

## 2023-01-01 RX ADMIN — Medication 81 MILLIGRAM(S): at 09:10

## 2023-01-01 RX ADMIN — ENOXAPARIN SODIUM 40 MILLIGRAM(S): 100 INJECTION SUBCUTANEOUS at 21:47

## 2023-01-01 RX ADMIN — Medication 100 MILLIGRAM(S): at 05:56

## 2023-01-01 RX ADMIN — Medication 12.5 MILLIGRAM(S): at 21:08

## 2023-01-01 RX ADMIN — Medication 81 MILLIGRAM(S): at 09:13

## 2023-01-01 RX ADMIN — Medication 100 MILLIGRAM(S): at 05:36

## 2023-01-01 RX ADMIN — CLOPIDOGREL BISULFATE 300 MILLIGRAM(S): 75 TABLET, FILM COATED ORAL at 18:04

## 2023-01-01 RX ADMIN — SODIUM CHLORIDE 1000 MILLILITER(S): 9 INJECTION INTRAMUSCULAR; INTRAVENOUS; SUBCUTANEOUS at 11:43

## 2023-01-01 RX ADMIN — ENOXAPARIN SODIUM 40 MILLIGRAM(S): 100 INJECTION SUBCUTANEOUS at 21:41

## 2023-01-01 RX ADMIN — Medication 100 MILLIGRAM(S): at 05:54

## 2023-01-01 RX ADMIN — BUDESONIDE AND FORMOTEROL FUMARATE DIHYDRATE 2 PUFF(S): 160; 4.5 AEROSOL RESPIRATORY (INHALATION) at 20:27

## 2023-01-01 RX ADMIN — AZITHROMYCIN 255 MILLIGRAM(S): 500 TABLET, FILM COATED ORAL at 13:07

## 2023-01-01 RX ADMIN — Medication 81 MILLIGRAM(S): at 05:08

## 2023-01-01 RX ADMIN — Medication 100 MILLIGRAM(S): at 13:25

## 2023-01-01 RX ADMIN — ENOXAPARIN SODIUM 40 MILLIGRAM(S): 100 INJECTION SUBCUTANEOUS at 22:10

## 2023-01-01 RX ADMIN — Medication 12.5 MILLIGRAM(S): at 12:34

## 2023-01-01 RX ADMIN — ALBUTEROL 2.5 MILLIGRAM(S): 90 AEROSOL, METERED ORAL at 16:04

## 2023-01-01 RX ADMIN — Medication 81 MILLIGRAM(S): at 09:46

## 2023-01-01 RX ADMIN — Medication 200 MILLIGRAM(S): at 05:55

## 2023-01-01 RX ADMIN — Medication 100 MILLIGRAM(S): at 21:48

## 2023-01-01 RX ADMIN — Medication 40 MILLIGRAM(S): at 09:46

## 2023-01-01 RX ADMIN — ALBUTEROL 2.5 MILLIGRAM(S): 90 AEROSOL, METERED ORAL at 14:02

## 2023-01-01 RX ADMIN — Medication 12.5 MILLIGRAM(S): at 21:28

## 2023-01-01 RX ADMIN — FAMOTIDINE 20 MILLIGRAM(S): 10 INJECTION INTRAVENOUS at 21:08

## 2023-01-01 RX ADMIN — Medication 12.5 MILLIGRAM(S): at 10:18

## 2023-01-01 RX ADMIN — Medication 40 MILLIGRAM(S): at 05:56

## 2023-01-01 RX ADMIN — ALBUTEROL 2.5 MILLIGRAM(S): 90 AEROSOL, METERED ORAL at 20:27

## 2023-01-01 RX ADMIN — ALBUTEROL 2.5 MILLIGRAM(S): 90 AEROSOL, METERED ORAL at 20:26

## 2023-01-01 RX ADMIN — HEPARIN SODIUM 850 UNIT(S)/HR: 5000 INJECTION INTRAVENOUS; SUBCUTANEOUS at 19:15

## 2023-01-01 RX ADMIN — BUDESONIDE AND FORMOTEROL FUMARATE DIHYDRATE 2 PUFF(S): 160; 4.5 AEROSOL RESPIRATORY (INHALATION) at 23:41

## 2023-01-01 RX ADMIN — HEPARIN SODIUM 850 UNIT(S)/HR: 5000 INJECTION INTRAVENOUS; SUBCUTANEOUS at 19:19

## 2023-01-01 RX ADMIN — HEPARIN SODIUM 750 UNIT(S)/HR: 5000 INJECTION INTRAVENOUS; SUBCUTANEOUS at 21:12

## 2023-01-01 RX ADMIN — ALBUTEROL 2.5 MILLIGRAM(S): 90 AEROSOL, METERED ORAL at 08:50

## 2023-01-01 RX ADMIN — PANTOPRAZOLE SODIUM 40 MILLIGRAM(S): 20 TABLET, DELAYED RELEASE ORAL at 05:08

## 2023-01-01 RX ADMIN — Medication 40 MILLIGRAM(S): at 19:18

## 2023-01-01 RX ADMIN — Medication 12.5 MILLIGRAM(S): at 10:56

## 2023-01-01 RX ADMIN — AZITHROMYCIN 255 MILLIGRAM(S): 500 TABLET, FILM COATED ORAL at 12:19

## 2023-01-01 RX ADMIN — BUDESONIDE AND FORMOTEROL FUMARATE DIHYDRATE 2 PUFF(S): 160; 4.5 AEROSOL RESPIRATORY (INHALATION) at 20:17

## 2023-01-01 RX ADMIN — Medication 40 MILLIGRAM(S): at 05:27

## 2023-01-01 RX ADMIN — ATORVASTATIN CALCIUM 80 MILLIGRAM(S): 80 TABLET, FILM COATED ORAL at 21:27

## 2023-01-01 RX ADMIN — HEPARIN SODIUM 750 UNIT(S)/HR: 5000 INJECTION INTRAVENOUS; SUBCUTANEOUS at 16:47

## 2023-01-01 RX ADMIN — AZITHROMYCIN 255 MILLIGRAM(S): 500 TABLET, FILM COATED ORAL at 14:43

## 2023-01-01 RX ADMIN — HEPARIN SODIUM 850 UNIT(S)/HR: 5000 INJECTION INTRAVENOUS; SUBCUTANEOUS at 17:37

## 2023-01-01 RX ADMIN — Medication 100 MILLIGRAM(S): at 22:39

## 2023-01-01 RX ADMIN — HEPARIN SODIUM 850 UNIT(S)/HR: 5000 INJECTION INTRAVENOUS; SUBCUTANEOUS at 15:12

## 2023-01-01 RX ADMIN — HEPARIN SODIUM 3800 UNIT(S): 5000 INJECTION INTRAVENOUS; SUBCUTANEOUS at 13:34

## 2023-01-01 RX ADMIN — HEPARIN SODIUM 850 UNIT(S)/HR: 5000 INJECTION INTRAVENOUS; SUBCUTANEOUS at 07:21

## 2023-01-01 RX ADMIN — HEPARIN SODIUM 950 UNIT(S)/HR: 5000 INJECTION INTRAVENOUS; SUBCUTANEOUS at 00:06

## 2023-01-01 RX ADMIN — HEPARIN SODIUM 850 UNIT(S)/HR: 5000 INJECTION INTRAVENOUS; SUBCUTANEOUS at 03:45

## 2023-01-01 RX ADMIN — ALBUTEROL 2.5 MILLIGRAM(S): 90 AEROSOL, METERED ORAL at 01:13

## 2023-01-01 RX ADMIN — FAMOTIDINE 20 MILLIGRAM(S): 10 INJECTION INTRAVENOUS at 12:35

## 2023-01-01 RX ADMIN — ATORVASTATIN CALCIUM 10 MILLIGRAM(S): 80 TABLET, FILM COATED ORAL at 21:48

## 2023-01-01 RX ADMIN — HEPARIN SODIUM 850 UNIT(S)/HR: 5000 INJECTION INTRAVENOUS; SUBCUTANEOUS at 10:28

## 2023-01-01 RX ADMIN — ALBUTEROL 2.5 MILLIGRAM(S): 90 AEROSOL, METERED ORAL at 18:43

## 2023-01-01 RX ADMIN — HEPARIN SODIUM 850 UNIT(S)/HR: 5000 INJECTION INTRAVENOUS; SUBCUTANEOUS at 17:07

## 2023-01-01 RX ADMIN — Medication 100 MILLIGRAM(S): at 22:10

## 2023-01-01 RX ADMIN — Medication 12.5 MILLIGRAM(S): at 09:26

## 2023-01-01 RX ADMIN — AZITHROMYCIN 255 MILLIGRAM(S): 500 TABLET, FILM COATED ORAL at 13:27

## 2023-01-01 RX ADMIN — Medication 0.5 MILLIGRAM(S): at 08:55

## 2023-01-01 RX ADMIN — FAMOTIDINE 20 MILLIGRAM(S): 10 INJECTION INTRAVENOUS at 10:18

## 2023-01-01 RX ADMIN — ENOXAPARIN SODIUM 40 MILLIGRAM(S): 100 INJECTION SUBCUTANEOUS at 22:39

## 2023-01-01 RX ADMIN — ATORVASTATIN CALCIUM 80 MILLIGRAM(S): 80 TABLET, FILM COATED ORAL at 20:56

## 2023-01-01 RX ADMIN — SODIUM CHLORIDE 250 MILLILITER(S): 9 INJECTION INTRAMUSCULAR; INTRAVENOUS; SUBCUTANEOUS at 09:08

## 2023-01-01 RX ADMIN — SODIUM CHLORIDE 128 MILLILITER(S): 9 INJECTION INTRAMUSCULAR; INTRAVENOUS; SUBCUTANEOUS at 11:38

## 2023-01-01 RX ADMIN — Medication 81 MILLIGRAM(S): at 12:36

## 2023-01-01 RX ADMIN — BUDESONIDE AND FORMOTEROL FUMARATE DIHYDRATE 2 PUFF(S): 160; 4.5 AEROSOL RESPIRATORY (INHALATION) at 08:50

## 2023-01-01 RX ADMIN — ALBUTEROL 2.5 MILLIGRAM(S): 90 AEROSOL, METERED ORAL at 13:24

## 2023-01-01 RX ADMIN — ALBUTEROL 2.5 MILLIGRAM(S): 90 AEROSOL, METERED ORAL at 09:38

## 2023-01-01 RX ADMIN — Medication 100 MILLIGRAM(S): at 05:27

## 2023-01-01 RX ADMIN — HEPARIN SODIUM 750 UNIT(S)/HR: 5000 INJECTION INTRAVENOUS; SUBCUTANEOUS at 13:33

## 2023-01-01 RX ADMIN — ATORVASTATIN CALCIUM 10 MILLIGRAM(S): 80 TABLET, FILM COATED ORAL at 21:41

## 2023-01-01 RX ADMIN — ALBUTEROL 2.5 MILLIGRAM(S): 90 AEROSOL, METERED ORAL at 14:34

## 2023-01-01 RX ADMIN — Medication 40 MILLIGRAM(S): at 21:41

## 2023-01-01 RX ADMIN — Medication 40 MILLIGRAM(S): at 22:39

## 2023-01-01 RX ADMIN — HEPARIN SODIUM 750 UNIT(S)/HR: 5000 INJECTION INTRAVENOUS; SUBCUTANEOUS at 19:05

## 2023-01-01 RX ADMIN — Medication 100 MILLIGRAM(S): at 14:57

## 2023-01-01 RX ADMIN — CEFTRIAXONE 1000 MILLIGRAM(S): 500 INJECTION, POWDER, FOR SOLUTION INTRAMUSCULAR; INTRAVENOUS at 13:06

## 2023-01-01 RX ADMIN — Medication 200 MILLIGRAM(S): at 14:30

## 2023-01-01 RX ADMIN — Medication 81 MILLIGRAM(S): at 10:18

## 2023-01-01 RX ADMIN — CEFTRIAXONE 1000 MILLIGRAM(S): 500 INJECTION, POWDER, FOR SOLUTION INTRAMUSCULAR; INTRAVENOUS at 12:18

## 2023-01-01 RX ADMIN — Medication 100 MILLIGRAM(S): at 21:41

## 2023-01-01 RX ADMIN — FAMOTIDINE 20 MILLIGRAM(S): 10 INJECTION INTRAVENOUS at 21:28

## 2023-01-01 RX ADMIN — Medication 3 MILLIGRAM(S): at 21:48

## 2023-01-01 RX ADMIN — CEFTRIAXONE 1000 MILLIGRAM(S): 500 INJECTION, POWDER, FOR SOLUTION INTRAMUSCULAR; INTRAVENOUS at 14:44

## 2023-01-01 RX ADMIN — Medication 40 MILLIGRAM(S): at 14:44

## 2023-01-01 RX ADMIN — BUDESONIDE AND FORMOTEROL FUMARATE DIHYDRATE 2 PUFF(S): 160; 4.5 AEROSOL RESPIRATORY (INHALATION) at 09:39

## 2023-01-01 RX ADMIN — Medication 40 MILLIGRAM(S): at 22:10

## 2023-01-01 RX ADMIN — ALBUTEROL 2.5 MILLIGRAM(S): 90 AEROSOL, METERED ORAL at 08:44

## 2023-01-01 RX ADMIN — Medication 40 MILLIGRAM(S): at 09:10

## 2023-01-01 RX ADMIN — PANTOPRAZOLE SODIUM 40 MILLIGRAM(S): 20 TABLET, DELAYED RELEASE ORAL at 04:29

## 2023-01-01 RX ADMIN — CLOPIDOGREL BISULFATE 75 MILLIGRAM(S): 75 TABLET, FILM COATED ORAL at 04:29

## 2023-01-01 RX ADMIN — ALBUTEROL 2.5 MILLIGRAM(S): 90 AEROSOL, METERED ORAL at 20:16

## 2023-01-01 RX ADMIN — Medication 125 MILLIGRAM(S): at 15:43

## 2023-01-01 RX ADMIN — ALBUTEROL 2.5 MILLIGRAM(S): 90 AEROSOL, METERED ORAL at 08:43

## 2023-01-01 RX ADMIN — Medication 40 MILLIEQUIVALENT(S): at 14:43

## 2023-01-01 RX ADMIN — CEFTRIAXONE 1000 MILLIGRAM(S): 500 INJECTION, POWDER, FOR SOLUTION INTRAMUSCULAR; INTRAVENOUS at 13:28

## 2023-01-01 RX ADMIN — SODIUM CHLORIDE 125 MILLILITER(S): 9 INJECTION INTRAMUSCULAR; INTRAVENOUS; SUBCUTANEOUS at 09:30

## 2023-01-01 RX ADMIN — Medication 81 MILLIGRAM(S): at 04:29

## 2023-01-01 RX ADMIN — ALBUTEROL 2.5 MILLIGRAM(S): 90 AEROSOL, METERED ORAL at 21:56

## 2023-01-01 RX ADMIN — Medication 100 MILLIGRAM(S): at 12:18

## 2023-01-01 RX ADMIN — HEPARIN SODIUM 850 UNIT(S)/HR: 5000 INJECTION INTRAVENOUS; SUBCUTANEOUS at 07:13

## 2023-01-01 RX ADMIN — ATORVASTATIN CALCIUM 10 MILLIGRAM(S): 80 TABLET, FILM COATED ORAL at 22:39

## 2023-05-04 NOTE — BRIEF OPERATIVE NOTE - POST-OP DX
Bobby Butler Patient Age: 57 year old  MESSAGE: Interpreting service used: No    Insurance on file confirmed with caller: Yes    IM/FP- Medication Question-     Name of the Pharmacy: [facility]     Name of the medication: glipiZIDE (GLUCOTROL) 10 MG tablet    Question about: Medication Ordered but Not Received at Pharmacy      Patient states he spoke to pharmacy and they have not received medication refill request. Please resubmit refill order.         Patient is completely out of medication.   Routing message HIGH PRIORITY        Patient could not stay on the line.   Please call patient back with any questions.         Select provider's home site [facility]- Connect call to [email or web address] queue- Route message to provider's clinical support pool    Message read back to caller for accuracy: Yes       ALLERGIES:  Patient has no known allergies.  Current Outpatient Medications   Medication Sig Dispense Refill   • glipiZIDE (GLUCOTROL) 10 MG tablet Take 1 tablet by mouth twice daily before meals. 180 tablet 1   • ondansetron (ZOFRAN) 8 MG tablet Take 1 tablet by mouth every 8 hours as needed for Nausea. 12 tablet 0   • metFORMIN (GLUCOPHAGE-XR) 500 MG 24 hr tablet TAKE 2 TABLETS BY MOUTH TWICE DAILY WITH MEALS 360 tablet 1   • Semaglutide,0.25 or 0.5MG/DOS, (Ozempic, 0.25 or 0.5 MG/DOSE,) 2 MG/3ML Solution Pen-injector Inject 0.25 mg into the skin every 7 days for 28 days, THEN 0.5 mg every 7 days. 3 mL 1   • levocetirizine (XYZAL) 5 MG tablet Take 1 tablet by mouth every evening. 90 tablet 3   • Azelastine HCl 137 MCG/SPRAY Solution Spray 1 spray in each nostril 2 times daily. USE 1 SPRAY(S) IN EACH NOSTRIL IN THE MORNING AND 1 IN THE EVENING AS DIRECTED 30 mL 3   • escitalopram (LEXAPRO) 20 MG tablet Take 1 tablet by mouth in the morning and 1 tablet in the evening. 180 tablet 1   • lamoTRIgine (LaMICtal) 100 MG tablet Take 1 tablet by mouth in the morning and 1 tablet in the evening. 180 tablet 1   • clonazePAM (KlonoPIN) 0.5  Rotator cuff tear  01/09/2019    Active  Yakov Argueta MG tablet Take 1 tablet by mouth daily as needed for Anxiety. 90 tablet 1   • benztropine (COGENTIN) 0.5 MG tablet Take 1 tablet by mouth nightly. 1 tablet 0   • pioglitazone (ACTOS) 30 MG tablet 1 tab daily 90 tablet 1   • simvastatin (ZOCOR) 10 MG tablet Take 1 tablet by mouth daily. 90 tablet 3   • sildenafil (Viagra) 50 MG tablet Take 1 tablet by mouth as needed for Erectile Dysfunction. 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 Patient using Contour meter       No current facility-administered medications for this visit.     PHARMACY to use: see below           Pharmacy preference(s) on file:   [facility] [address] [place], [place] [address] [facility]  [facility] [address] [address]  Phone: [phone] Fax: [phone]      CALL BACK INFO: Ok to leave response (including medical information) with family member or on answering machine      PCP: Victoria Crowley PA-C         INS: Payor: [employer] / Plan: HINA MATTA BP [id number] / Product Type: HINA SILVER   PATIENT ADDRESS:  [address] [address]

## 2023-05-22 PROBLEM — L57.0 ACTINIC KERATOSIS: Status: ACTIVE | Noted: 2017-07-03

## 2023-05-22 NOTE — HISTORY OF PRESENT ILLNESS
[FreeTextEntry1] : Patient presents for skin examination. [de-identified] : Denies new, changing, bleeding or tender lesions on the skin over the past 6 months.\par

## 2023-05-22 NOTE — PHYSICAL EXAM
[Alert] : alert [Oriented x 3] : ~L oriented x 3 [Well Nourished] : well nourished [Full Body Skin Exam Performed] : performed [FreeTextEntry3] : A full skin exam was performed including the scalp, face (including lips, ears, nose and eyes), neck, chest, abdomen, back, buttocks, upper extremities and lower extremities.  The patient declined examination of the genitalia.  \par The exam revealed the following benign growths:\par [place] pigmented nevi.\par Seborrheic keratoses.\par Lentigines.\par \par keratotic papules, right superior ear and right anterior ear.\par

## 2023-06-21 NOTE — H&P PST ADULT - DENTITION
normal/upper debtures
[facility]: Patient signed out to me from Dr. Morales.  89-year-old female with PMHx as noted, in ER with c/o R hip pain for several months.  No known recent trauma.  Labs unremarkable.  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  R common iliac artery 2.8 cm, was previously 2.5 cm).  Results of imaging d/w patient's daughter, she was told of increased size of aortic aneurysms, recommended vascular evaluation.  Patient and daughter wish to follow-up with vascular as outpatient.  Patient/daughter told return to ER immediately for worsening pain, dizziness, N/V, syncope/near syncope, or any other new/concerning symptoms.

## 2023-07-24 NOTE — ED ADULT TRIAGE NOTE - CHIEF COMPLAINT QUOTE
Pt BIBA from home for difficulty breathing. As per EMS pt with hx intersitial lung disease, had a sudden onset of respiratory distress, on home PRN O2. As per EMS pt was 89% Sat on room air. PIV obtained, L18 FA, pt imroved after 1 duoneb tx with 250 ml NS. No respiratory distress in triage. NO CP, n/v/d or fevers reported. As per wife has not been eating well lately. EKG innitiated, pt on cardiac monitor with .

## 2023-07-24 NOTE — ED ADULT NURSE NOTE - NSFALLHARMRISKINTERV_ED_ALL_ED
Assistance OOB with selected safe patient handling equipment if applicable/Communicate risk of Fall with Harm to all staff, patient, and family/Provide visual cue: red socks, yellow wristband, yellow gown, etc/Reinforce activity limits and safety measures with patient and family/Bed in lowest position, wheels locked, appropriate side rails in place/Call bell, personal items and telephone in reach/Instruct patient to call for assistance before getting out of bed/chair/stretcher/Non-slip footwear applied when patient is off stretcher/[place] to call system/Physically safe environment - no spills, clutter or unnecessary equipment/Purposeful Proactive Rounding/Room/bathroom lighting operational, light cord in reach

## 2023-07-24 NOTE — ED PROVIDER NOTE - CONSTITUTIONAL, MLM
normal... Well appearing, awake, alert, oriented to person, place, time/situation and in mild respiratory distress

## 2023-07-24 NOTE — H&P ADULT - NSICDXPASTMEDICALHX_GEN_ALL_CORE_FT
PAST MEDICAL HISTORY:  Arthritis     Deep vein thrombosis (DVT) Right leg 2016 ? unknown cause    [place] (hard of hearing) uses hearing aid    Left carpal tunnel syndrome     Squamous cell carcinoma

## 2023-07-24 NOTE — ED ADULT NURSE NOTE - NSICDXPASTMEDICALHX_GEN_ALL_CORE_FT
PAST MEDICAL HISTORY:  Arthritis     Deep vein thrombosis (DVT) Right leg 2016 ? unknown cause    [place] (hard of hearing) uses hearing aid    Left carpal tunnel syndrome     Squamous cell carcinoma

## 2023-07-24 NOTE — ED PROVIDER NOTE - OBJECTIVE STATEMENT
87 yo male wPMHx of intersitial lung disease, DVT, arthritis, COPD presents to the ED BIBEMS from home c/o difficulty breathing x 3 days. Per EMS pt had a sudden onset of respiratory distress, on home PRN O2. Per wife pt was working in the heat for the past 3 days and he might be dehydrated.

## 2023-07-24 NOTE — ED ADULT NURSE NOTE - NS PRO PASSIVE SMOKE EXP
Pt to cat scan and x ray via cart.       Dawson Wilson RN  05/20/20 2012
poc preg negative     Robert Blue.  [facility]  05/20/20 [id number]
No

## 2023-07-24 NOTE — H&P ADULT - NSHPLABSRESULTS_GEN_ALL_CORE
11.7   11.48 )-----------( 118      ( 2023 11:30 )             35.1     2023 11:30    134    |  104    |  27     ----------------------------<  141    3.5     |  21     |  1.12     Ca    8.2        2023 11:30    TPro  6.8    /  Alb  3.1    /  TBili  1.2    /  DBili  x      /  AST  28     /  ALT  27     /  AlkPhos  82     2023 11:30    LIVER FUNCTIONS - ( 2023 11:30 )  Alb: 3.1 g/dL / Pro: 6.8 gm/dL / ALK PHOS: 82 U/L / ALT: 27 U/L / AST: 28 U/L / GGT: x           PT/INR - ( 2023 11:30 )   PT: 16.1 sec;   INR: 1.38 ratio        PTT - ( 2023 11:30 )  PTT:32.1 sec    Urinalysis Basic - ( 2023 14:27 )  Color: Yellow / Appearance: Slightly Turbid / S.015 / pH: x  Gluc: x / Ketone: Trace  / Bili: Negative / Urobili: Negative   Blood: x / Protein: 30 mg/dL / Nitrite: Negative   Leuk Esterase: Negative / RBC: 3-5 /HPF / WBC 0-2 /HPF   Sq Epi: x / Non Sq Epi: x / Bacteria: Moderate

## 2023-07-24 NOTE — H&P ADULT - ASSESSMENT
#LT lung pneumonia vs mass  #ILD/ chronic resp failure on home O2  Admit to med-surg  Pancultured  CXR reviewed- large infiltrate LT mid lung  Will get CT chest to visualize better  Pulm eval DR Mckenzie  Started on IV Rocephin/ Zithromax  Nebulizer, inhalers  O2     #Hyperlipidemia- cont statin    #DVT proph- Lovenox    #Advanced directives- FULL code     #Dispo- inpatient admit

## 2023-07-24 NOTE — PATIENT PROFILE ADULT - FALL HARM RISK - HARM RISK INTERVENTIONS

## 2023-07-24 NOTE — ED PROVIDER NOTE - NSICDXPASTMEDICALHX_GEN_ALL_CORE_FT
PAST MEDICAL HISTORY:  Arthritis     Deep vein thrombosis (DVT) Right leg 2016 ? unknown cause    [place] (hard of hearing) uses hearing aid    Left carpal tunnel syndrome     Squamous cell carcinoma

## 2023-07-24 NOTE — H&P ADULT - NSHPPHYSICALEXAM_GEN_ALL_CORE
Vital Signs Last 24 Hrs  T(C): 36.7 (24 Jul 2023 11:13), Max: 36.7 (24 Jul 2023 11:13)  T(F): 98.1 (24 Jul 2023 11:13), Max: 98.1 (24 Jul 2023 11:13)  HR: 84 (24 Jul 2023 13:32) (76 - 89)  BP: 102/57 (24 Jul 2023 13:32) (95/53 - 122/56)  BP(mean): 72 (24 Jul 2023 13:32) (65 - 73)  RR: 25 (24 Jul 2023 13:32) (19 - 32)  SpO2: 88% (24 Jul 2023 13:32) (88% - 100%)    Parameters below as of 24 Jul 2023 13:32  Patient On (Oxygen Delivery Method): nasal cannula  O2 Flow (L/min): 3

## 2023-07-24 NOTE — ED ADULT NURSE NOTE - OBJECTIVE STATEMENT
Patient is a 88y old male, alert and oriented X3, presenting to the ER with c/o difficulty breathing. Patient's wife Patient is a 88y old male, alert and oriented X3, presenting to the ER with c/o difficulty breathing. Patient's wife reports "he has been working in the yard for the past 3 days fixing the fence and gate in the hot weather. Last night his breathing was worse where he had to wear his oxygen to bed." Patient reports "I have been having difficulty breathing for the past 3 days. This morning I only ate half my breakfast because I couldn't breath."   Patient denies CP, fevers, nausea, vomiting, diarrhea, lightheaded, dizzy, blood in stool or urine.   Hx: COPD, impacted lung.   20GA IV placed in the right forearm, blood cultures/lactate/blood work collected and sent to lab. IV placed in the left AC by EMS, flushes without difficulty.  EKG completed and cardiac monitor in place. Patient on 2L of oxygen via nasal cannula.

## 2023-07-24 NOTE — H&P ADULT - HISTORY OF PRESENT ILLNESS
87yo/M with PMH ILD/ chronic resp failure on home O2, hyperlipidemia presented with worsening SOB and productive cough for about a week. Patient states he usually uses oxygen as needed, but last few days was getting more SOB and was using oxygen more. He admit to having cough productive of yellowish sputum. No hemoptysis. Never been an active smoker, but has been passive smoker for many years. No fever/ chills

## 2023-07-25 NOTE — CONSULT NOTE ADULT - SUBJECTIVE AND OBJECTIVE BOX
Patient is a 88y old  Male who presents with a chief complaint of Cough, worsening SOB (25 Jul 2023 07:31)    HPI:  87yo/M with PMH ILD/ chronic resp failure on home O2, hyperlipidemia presented with worsening SOB and productive cough for about a week. Patient states he usually uses oxygen as needed, but last few days was getting more SOB and was using oxygen more. He admit to having cough productive of yellowish sputum. No hemoptysis. Never been an active smoker, but has been passive smoker for many years. No fever/ chills (24 Jul 2023 16:09)      PMH: as above  PSH: as above  Meds: per reconciliation sheet, noted below  MEDICATIONS  (STANDING):  albuterol    0.083% 2.5 milliGRAM(s) Nebulizer every 6 hours  aspirin enteric coated 81 milliGRAM(s) Oral daily  atorvastatin 10 milliGRAM(s) Oral at bedtime  azithromycin  IVPB 500 milliGRAM(s) IV Intermittent every 24 hours  benzonatate 100 milliGRAM(s) Oral three times a day  budesonide 160 MICROgram(s)/formoterol 4.5 MICROgram(s) Inhaler 2 Puff(s) Inhalation two times a day  cefTRIAXone Injectable. 1000 milliGRAM(s) IV Push every 24 hours  enoxaparin Injectable 40 milliGRAM(s) SubCutaneous every 24 hours    MEDICATIONS  (PRN):  acetaminophen     Tablet .. 650 milliGRAM(s) Oral every 6 hours PRN Mild Pain (1 - 3)  aluminum hydroxide/magnesium hydroxide/simethicone Suspension 30 milliLiter(s) Oral every 4 hours PRN Dyspepsia  guaiFENesin Oral Liquid (Sugar-Free) 200 milliGRAM(s) Oral every 6 hours PRN Cough  melatonin 3 milliGRAM(s) Oral at bedtime PRN Insomnia  ondansetron Injectable 4 milliGRAM(s) IV Push every 8 hours PRN Nausea and/or Vomiting    Allergies    No Known Allergies    Intolerances      Social: no smoking, no alcohol, no illegal drugs; no recent travel, no exposure to TB  FAMILY HISTORY:  FH: bladder cancer  father    FHx: uterine cancer  sister       no history of premature cardiovascular disease in first degree relatives    ROS: the patient denies fever, no chills, no HA, no dizziness, no sore throat, no blurry vision, no CP, no palpitations, no SOB, no cough, no abdominal pain, no diarrhea, no N/V, no dysuria, no leg pain, no claudication, no rash, no joint aches, no rectal pain or bleeding, no night sweats  All other systems reviewed and are negative    Vital Signs Last 24 Hrs  T(C): 36.8 (25 Jul 2023 08:25), Max: 37.3 (24 Jul 2023 20:00)  T(F): 98.2 (25 Jul 2023 08:25), Max: 99.1 (24 Jul 2023 20:00)  HR: 86 (25 Jul 2023 08:25) (72 - 87)  BP: 120/62 (25 Jul 2023 08:25) (95/53 - 120/62)  BP(mean): 76 (24 Jul 2023 16:17) (67 - 76)  RR: 20 (25 Jul 2023 08:25) (19 - 30)  SpO2: 94% (25 Jul 2023 08:25) (88% - 100%)    Parameters below as of 25 Jul 2023 08:25  Patient On (Oxygen Delivery Method): nasal cannula  O2 Flow (L/min): 3    Daily     Daily     PE:    Constitutional: frail looking  HEENT: NC/AT, EOMI, PERRLA, conjunctivae clear; ears and nose atraumatic; pharynx benign  Neck: supple; thyroid not palpable  Back: no tenderness  Respiratory: respiratory effort normal; clear to auscultation  Cardiovascular: S1S2 regular, no murmurs  Abdomen: soft, not tender, not distended, positive BS; liver and spleen WNL  Genitourinary: no suprapubic tenderness  Lymphatic: no LN palpable  Musculoskeletal: no muscle tenderness, no joint swelling or tenderness  Extremities: no pedal edema  Neurological/ Psychiatric: AxOx3, Judgement and insight normal;  moving all extremities  Skin: no rashes; no palpable lesions    Labs: all available labs reviewed                        11.2   8.72  )-----------( 111      ( 25 Jul 2023 06:51 )             32.8     07-25    134<L>  |  106  |  24<H>  ----------------------------<  114<H>  3.6   |  20<L>  |  1.00    Ca    8.1<L>      25 Jul 2023 06:51    TPro  6.8  /  Alb  3.1<L>  /  TBili  1.2  /  DBili  x   /  AST  28  /  ALT  27  /  AlkPhos  82  07-24     LIVER FUNCTIONS - ( 24 Jul 2023 11:30 )  Alb: 3.1 g/dL / Pro: 6.8 gm/dL / ALK PHOS: 82 U/L / ALT: 27 U/L / AST: 28 U/L / GGT: x           Urinalysis Basic - ( 25 Jul 2023 06:51 )    Color: x / Appearance: x / SG: x / pH: x  Gluc: 114 mg/dL / Ketone: x  / Bili: x / Urobili: x   Blood: x / Protein: x / Nitrite: x   Leuk Esterase: x / RBC: x / WBC x   Sq Epi: x / Non Sq Epi: x / Bacteria: x          Radiology: all available radiological tests reviewed    Advanced directives addressed: full resuscitation Patient is a 88y old  Male who presents with a chief complaint of Cough, worsening SOB (25 Jul 2023 07:31)    HPI:  87yo/M with PMH ILD/ chronic resp failure on home O2, hyperlipidemia presented with worsening SOB and productive cough for about a week. Patient states he usually uses oxygen as needed, but last few days was getting more SOB and was using oxygen more. He admit to having cough productive of yellowish sputum. No hemoptysis. Never been an active smoker, but has been passive smoker for many years. No fever/ chills. Left upper lobe consolidation, suspicious for pneumonia in the appropriate clinical setting. Bilateral upper lobe groundglass opacities, which could represent superimposed pulmonary edema or infection. Eval by pulmonary, given IV rocephin/azithromycin.       PMH: as above  PSH: as above    Meds: per reconciliation sheet, noted below  MEDICATIONS  (STANDING):  albuterol    0.083% 2.5 milliGRAM(s) Nebulizer every 6 hours  aspirin enteric coated 81 milliGRAM(s) Oral daily  atorvastatin 10 milliGRAM(s) Oral at bedtime  azithromycin  IVPB 500 milliGRAM(s) IV Intermittent every 24 hours  benzonatate 100 milliGRAM(s) Oral three times a day  budesonide 160 MICROgram(s)/formoterol 4.5 MICROgram(s) Inhaler 2 Puff(s) Inhalation two times a day  cefTRIAXone Injectable. 1000 milliGRAM(s) IV Push every 24 hours  enoxaparin Injectable 40 milliGRAM(s) SubCutaneous every 24 hours      Allergies    No Known Allergies    Intolerances      Social: no smoking, no alcohol, no illegal drugs; no recent travel, no exposure to TB    FAMILY HISTORY:  FH: bladder cancer  father    FHx: uterine cancer  sister       no history of premature cardiovascular disease in first degree relatives    ROS: no fever, chills, no HA, no dizziness, no sore throat, no blurry vision, no CP, no palpitations, + SOB, + cough, no abdominal pain, no diarrhea, no N/V, no dysuria, no leg pain, no claudication, no rash, no joint aches, no rectal pain or bleeding, no night sweats    All other systems reviewed and are negative    Vital Signs Last 24 Hrs  T(C): 36.8 (25 Jul 2023 08:25), Max: 37.3 (24 Jul 2023 20:00)  T(F): 98.2 (25 Jul 2023 08:25), Max: 99.1 (24 Jul 2023 20:00)  HR: 86 (25 Jul 2023 08:25) (72 - 87)  BP: 120/62 (25 Jul 2023 08:25) (95/53 - 120/62)  BP(mean): 76 (24 Jul 2023 16:17) (67 - 76)  RR: 20 (25 Jul 2023 08:25) (19 - 30)  SpO2: 94% (25 Jul 2023 08:25) (88% - 100%)    Parameters below as of 25 Jul 2023 08:25  Patient On (Oxygen Delivery Method): nasal cannula  O2 Flow (L/min): 3    Daily     Daily     PE:    Constitutional: frail looking  HEENT: NC/AT, EOMI, PERRLA, conjunctivae clear; ears and nose atraumatic; pharynx benign  Neck: supple; thyroid not palpable  Back: no tenderness  Respiratory: respiratory effort normal; clear to auscultation  Cardiovascular: S1S2 regular, no murmurs  Abdomen: soft, not tender, not distended, positive BS; liver and spleen WNL  Genitourinary: no suprapubic tenderness  Lymphatic: no LN palpable  Musculoskeletal: no muscle tenderness, no joint swelling or tenderness  Extremities: no pedal edema  Neurological/ Psychiatric: AxOx3, Judgement and insight normal;  moving all extremities  Skin: no rashes; no palpable lesions    Labs: all available labs reviewed                        11.2   8.72  )-----------( 111      ( 25 Jul 2023 06:51 )             32.8     07-25    134<L>  |  106  |  24<H>  ----------------------------<  114<H>  3.6   |  20<L>  |  1.00    Ca    8.1<L>      25 Jul 2023 06:51    TPro  6.8  /  Alb  3.1<L>  /  TBili  1.2  /  DBili  x   /  AST  28  /  ALT  27  /  AlkPhos  82  07-24     LIVER FUNCTIONS - ( 24 Jul 2023 11:30 )  Alb: 3.1 g/dL / Pro: 6.8 gm/dL / ALK PHOS: 82 U/L / ALT: 27 U/L / AST: 28 U/L / GGT: x           Urinalysis Basic - ( 25 Jul 2023 06:51 )    Color: x / Appearance: x / SG: x / pH: x  Gluc: 114 mg/dL / Ketone: x  / Bili: x / Urobili: x   Blood: x / Protein: x / Nitrite: x   Leuk Esterase: x / RBC: x / WBC x   Sq Epi: x / Non Sq Epi: x / Bacteria: x          Radiology: all available radiological tests reviewed  < from: CT Chest No Cont (07.24.23 @ 20:08) >    ACC: [id number] EXAM:  CT CHEST   ORDERED BY: KIMBERLY BOYD     PROCEDURE DATE:  07/24/2023          INTERPRETATION:  CLINICAL INFORMATION: Sepsis. Shortness of breath.   Productive cough. Evaluate lung density on x-ray.    COMPARISON: Chest radiograph 7/24/2023    CONTRAST/COMPLICATIONS:  IV Contrast: NONE  Oral Contrast: NONE  Complications: None reported at time of study completion    PROCEDURE:  CT of the Chest was performed.  Sagittal and coronal reformats were performed.    FINDINGS:    LUNGS AND AIRWAYS: Patent central airways.  Consolidation in the left   upper lobe. Bilateral upper lobe groundglass opacities.  PLEURA: Trace left pleural effusion.  MEDIASTINUM AND PIO: Mildly enlarged mediastinal lymph nodes, which may   be reactive.  VESSELS: Atherosclerotic changes.  HEART: Cardiomegaly. No pericardial effusion. Coronary artery an mitral   annular calcification.  CHEST WALL AND LOWER NECK: Within normal limits.  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  BONES: Degenerative changes.    IMPRESSION:  Left upper lobe consolidation, suspicious for pneumonia in the   appropriate clinical setting.    Bilateral upper lobe groundglass opacities, which could represent   superimposed pulmonary edema or infection.    Recommend follow-up to resolution.      Advanced directives addressed: full resuscitation

## 2023-07-25 NOTE — CONSULT NOTE ADULT - ASSESSMENT
89yo/M with PMH ILD/ chronic resp failure on home O2, hyperlipidemia presented with worsening SOB and productive cough for about a week. Patient states he usually uses oxygen as needed, but last few days was getting more SOB and was using oxygen more. He admit to having cough productive of yellowish sputum. No hemoptysis. Never been an active smoker, but has been passive smoker for many years. No fever/ chills (24 Jul 2023 16:09)    Assessment / plan:  Chronic hypoxemic resp failure on Home O2 therapy  ILD / pulm fibrosis  superimpose TJ consolidation  can not r/o  / BOOP given clinical presentation  Adequate oxygenation and hemodynamically stable  Hx DVT in the past    agree with antibiotics  if not improved may need course of parenteral steroids  follow up cultures and xrays  check ESR and monitor labs  ct scan reviewed with pt today and his questions are answered  send sputum for cx    
89yo/M with PMH ILD/ chronic resp failure on home O2, hyperlipidemia presented with worsening SOB and productive cough for about a week. Patient states he usually uses oxygen as needed, but last few days was getting more SOB and was using oxygen more. He admit to having cough productive of yellowish sputum. No hemoptysis. Never been an active smoker, but has been passive smoker for many years. No fever/ chills. Left upper lobe consolidation, suspicious for pneumonia in the appropriate clinical setting. Bilateral upper lobe groundglass opacities, which could represent superimposed pulmonary edema or infection. Eval by pulmonary, given IV rocephin/azithromycin.     1. TJ pneumonia. Chronic respiratory failure. ILD/Pulmonary fibrosis  - imaging reviewed  - pulmonary eval noted  - f/u cultures, check sputum cx  - on IV rocephin/azithromycin #2  - continue with abx coverage  - monitor temps  - tolerating abx well so far; no side effects noted  - reason for abx use and side effects reviewed with patient  - supportive care  - fu cbc    2. other issues - per medicine

## 2023-07-25 NOTE — CONSULT NOTE ADULT - SUBJECTIVE AND OBJECTIVE BOX
Patient is a 88y old  Male who presents with a chief complaint of Cough, worsening SOB (2023 16:09)      HPI:  89yo/M with PMH ILD/ chronic resp failure on home O2, hyperlipidemia presented with worsening SOB and productive cough for about a week. Patient states he usually uses oxygen as needed, but last few days was getting more SOB and was using oxygen more. He admit to having cough productive of yellowish sputum. No hemoptysis. Never been an active smoker, but has been passive smoker for many years. No fever/ chills (2023 16:09)      PAST MEDICAL & SURGICAL HISTORY:  Squamous cell carcinoma      [place] (hard of hearing)  uses hearing aid      Arthritis      Deep vein thrombosis (DVT)  Right leg  ? unknown cause      Left carpal tunnel syndrome      H/O inguinal hernia repair  2017 right      H/O colonoscopy  2014      H/O squamous cell carcinoma excision  Bilateral ear 2018      S/P cataract surgery  2009 bilateral      S/P foot surgery, right  1968      S/P arthroscopy of right shoulder  2019      History of carpal tunnel surgery of right wrist  2019          PREVIOUS DIAGNOSTIC TESTING:      MEDICATIONS  (STANDING):  albuterol    0.083% 2.5 milliGRAM(s) Nebulizer every 6 hours  aspirin enteric coated 81 milliGRAM(s) Oral daily  atorvastatin 10 milliGRAM(s) Oral at bedtime  azithromycin  IVPB 500 milliGRAM(s) IV Intermittent every 24 hours  benzonatate 100 milliGRAM(s) Oral three times a day  budesonide 160 MICROgram(s)/formoterol 4.5 MICROgram(s) Inhaler 2 Puff(s) Inhalation two times a day  cefTRIAXone Injectable. 1000 milliGRAM(s) IV Push every 24 hours  enoxaparin Injectable 40 milliGRAM(s) SubCutaneous every 24 hours    MEDICATIONS  (PRN):  acetaminophen     Tablet .. 650 milliGRAM(s) Oral every 6 hours PRN Mild Pain (1 - 3)  aluminum hydroxide/magnesium hydroxide/simethicone Suspension 30 milliLiter(s) Oral every 4 hours PRN Dyspepsia  guaiFENesin Oral Liquid (Sugar-Free) 200 milliGRAM(s) Oral every 6 hours PRN Cough  melatonin 3 milliGRAM(s) Oral at bedtime PRN Insomnia  ondansetron Injectable 4 milliGRAM(s) IV Push every 8 hours PRN Nausea and/or Vomiting      FAMILY HISTORY:  FH: bladder cancer  father    FHx: uterine cancer  sister        SOCIAL HISTORY:  ***    REVIEW OF SYSTEM:  Pertinent items are noted in HPI.  Constitutional negative for chills, fevers, sweats and weight loss  throat, and face:  negative for epistaxis, nasal congestion, sore throat and   tinnitus  Respiratory: pos for cough, dyspnea on exertion,no pleuritic chest pain  and wheezing  Cardiovascular:  negative for chest pain, pos dyspnea and palpitations  Gastrointestinal: negative for abdominal pain, diarrhea, nausea and vomiting  Genitourinary: negative for dysuria, frequency and urinary incontinence  Skin:  negative for redness, rash, pruritus, swelling, dryness and   fissures  Hematologic/lymphatic: negative for bleeding and easy bruising  Musculoskeletal: negative for arthralgias, back pain and muscle weakness  Neurological: negative for dizziness, headaches, seizures and tremors  Behavioral/Psych:  negative for mood change, depression, suicidal attempts    Allergic/Immunologic: negative for anaphylaxis, angioedema and urticaria    Vital Signs Last 24 Hrs  T(C): 37.3 (2023 20:00), Max: 37.3 (2023 20:00)  T(F): 99.1 (2023 20:00), Max: 99.1 (2023 20:00)  HR: 72 (2023 20:00) (72 - 89)  BP: 112/53 (2023 20:00) (95/53 - 122/56)  BP(mean): 76 (2023 16:17) (65 - 76)  RR: 20 (2023 20:00) (19 - 32)  SpO2: 100% (2023 02:26) (88% - 100%)    Parameters below as of 2023 02:26  Patient On (Oxygen Delivery Method): nasal cannula        I&O's Summary    2023 07:01  -  2023 07:00  --------------------------------------------------------  IN: 0 mL / OUT: 300 mL / NET: -300 mL      PHYSICAL EXAM  General Appearance: cooperative, no acute distress, non toxic or ill appearing  HEENT: PERRL, conjunctiva clear, EOM's intact, non injected pharynx, no exudate, TM   normal  Neck: Supple, , no adenopathy, thyroid: not enlarged, no carotid bruit or JVD  Back: Symmetric, no  tenderness,no soft tissue tenderness  Lungs: velcro type crackles and L>R rhonchi on exam  Heart: Regular rate and rhythm, S1, S2 normal, no murmur, rub or gallop  Abdomen: Soft, non-tender, bowel sounds active , no hepatosplenomegaly  Extremities: no cyanosis or edema, no joint swelling  Skin: Skin color, texture normal, no rashes   Neurologic: Alert and oriented X3 , cranial nerves intact, sensory and motor normal,    ECG:    LABS:                          11.2   8.72  )-----------( 111      ( 2023 06:51 )             32.8     07-24    134<L>  |  104  |  27<H>  ----------------------------<  141<H>  3.5   |  21<L>  |  1.12    Ca    8.2<L>      2023 11:30    TPro  6.8  /  Alb  3.1<L>  /  TBili  1.2  /  DBili  x   /  AST  28  /  ALT  27  /  AlkPhos  82  07-24            PT/INR - ( 2023 11:30 )   PT: 16.1 sec;   INR: 1.38 ratio         PTT - ( 2023 11:30 )  PTT:32.1 sec  Urinalysis Basic - ( 2023 14:27 )    Color: Yellow / Appearance: Slightly Turbid / S.015 / pH: x  Gluc: x / Ketone: Trace  / Bili: Negative / Urobili: Negative   Blood: x / Protein: 30 mg/dL / Nitrite: Negative   Leuk Esterase: Negative / RBC: 3-5 /HPF / WBC 0-2 /HPF   Sq Epi: x / Non Sq Epi: x / Bacteria: Moderate      < from: CT Chest No Cont (23 @ 20:08) >  IMPRESSION:  Left upper lobe consolidation, suspicious for pneumonia in the   appropriate clinical setting.    Bilateral upper lobe groundglass opacities, which could represent   superimposed pulmonary edema or infection.    Recommend follow-up to resolution.    < end of copied text >  < from: Xray Chest 1 View- PORTABLE-Urgent (23 @ 12:01) >  PROCEDURE DATE:  2023          INTERPRETATION:  AP chest on 2023 11:54 AM. Patient has sepsis.    Elevated right hemidiaphragm again noted.    Present film shows some linear atelectasis off the right hilum and a   sizable left perihilar infiltrate new since 2009.    IMPRESSION: Fairly large left perihilar infiltrate and mild atelectasis   off right hilum presently seen.    < end of copied text >        RADIOLOGY & ADDITIONAL STUDIES:

## 2023-07-26 NOTE — PHYSICAL THERAPY INITIAL EVALUATION ADULT - GENERAL OBSERVATIONS, REHAB EVAL
Pt seen for 45min PT Eval. Pt rec'd sitting in chair in NAD, +3L O2 NC. Pt requires SB-CGA for all mobility, trans and amb 75ft with RW, dem dec step length, dec endurance, imapired balance with turns. Pt left sitting in chair in NAD all needs met, RN Aware, +chair alarm.

## 2023-07-26 NOTE — PHYSICAL THERAPY INITIAL EVALUATION ADULT - PERTINENT HX OF CURRENT PROBLEM, REHAB EVAL
87yo/M with PMH ILD/ chronic resp failure on home O2, hyperlipidemia presented with worsening SOB and productive cough for about a week. ILD/Pulm Fibrosis, PNA vs Mass    CT CHEST: Left upper lobe consolidation, suspicious for pneumonia in the appropriate clinical setting. Bilateral upper lobe groundglass opacities, which could represent superimposed pulmonary edema or infection. Recommend follow-up to resolution.

## 2023-07-28 NOTE — PROGRESS NOTE ADULT - SUBJECTIVE AND OBJECTIVE BOX
Date of service: 07-27-23 @ 17:20    pt seen and examined  feels better   less cough    ROS: no fever or chills; denies dizziness, no HA, no abdominal pain, no diarrhea or constipation; no dysuria, no urinary frequency, no legs pain, no rashes    MEDICATIONS  (STANDING):  albuterol    0.083% 2.5 milliGRAM(s) Nebulizer every 6 hours  aspirin enteric coated 81 milliGRAM(s) Oral daily  atorvastatin 10 milliGRAM(s) Oral at bedtime  benzonatate 100 milliGRAM(s) Oral three times a day  budesonide 160 MICROgram(s)/formoterol 4.5 MICROgram(s) Inhaler 2 Puff(s) Inhalation two times a day  cefTRIAXone Injectable. 1000 milliGRAM(s) IV Push every 24 hours  enoxaparin Injectable 40 milliGRAM(s) SubCutaneous every 24 hours  methylPREDNISolone sodium succinate Injectable 40 milliGRAM(s) IV Push every 12 hours    Vital Signs Last 24 Hrs  T(C): 36.3 (27 Jul 2023 15:10), Max: 36.3 (26 Jul 2023 22:05)  T(F): 97.3 (27 Jul 2023 15:10), Max: 97.3 (26 Jul 2023 22:05)  HR: 77 (27 Jul 2023 15:10) (69 - 77)  BP: 115/56 (27 Jul 2023 15:10) (106/60 - 118/62)  BP(mean): --  RR: 18 (27 Jul 2023 15:10) (18 - 18)  SpO2: 99% (27 Jul 2023 15:10) (94% - 99%)    Parameters below as of 27 Jul 2023 15:10  Patient On (Oxygen Delivery Method): nasal cannula  O2 Flow (L/min): 3              PE:  Constitutional: NAD   HEENT: NC/AT, EOMI, PERRLA, conjunctivae clear; ears and nose atraumatic; pharynx benign  Neck: supple; thyroid not palpable  Back: no tenderness  Respiratory: decreased breath sounds   Cardiovascular: S1S2 regular, no murmurs  Abdomen: soft, not tender, not distended, positive BS; liver and spleen WNL  Genitourinary: no suprapubic tenderness  Lymphatic: no LN palpable  Musculoskeletal: no muscle tenderness, no joint swelling or tenderness  Extremities: no pedal edema  Neurological/ Psychiatric: AxOx3, Judgement and insight normal;  moving all extremities  Skin: no rashes; no palpable lesions    Labs: all available labs reviewed                                   12.6   3.88  )-----------( 130      ( 26 Jul 2023 06:24 )             37.8     07-26    137  |  107  |  27<H>  ----------------------------<  207<H>  3.4<L>   |  21<L>  |  0.95    Ca    8.8      26 Jul 2023 06:24  Phos  3.2     07-26  Mg     2.5     07-26      Culture - Urine (07.24.23 @ 14:27)   Specimen Source: Clean Catch Clean Catch (Midstream)  Culture Results:   No growth  Culture - Blood (07.24.23 @ 11:30)   Specimen Source: .Blood Blood-Peripheral  Culture Results:   No growth at 48 Hours      Radiology: all available radiological tests reviewed  < from: CT Chest No Cont (07.24.23 @ 20:08) >    ACC: [id number] EXAM:  CT CHEST   ORDERED BY: KIMBERLY BOYD     PROCEDURE DATE:  07/24/2023          INTERPRETATION:  CLINICAL INFORMATION: Sepsis. Shortness of breath.   Productive cough. Evaluate lung density on x-ray.    COMPARISON: Chest radiograph 7/24/2023    CONTRAST/COMPLICATIONS:  IV Contrast: NONE  Oral Contrast: NONE  Complications: None reported at time of study completion    PROCEDURE:  CT of the Chest was performed.  Sagittal and coronal reformats were performed.    FINDINGS:    LUNGS AND AIRWAYS: Patent central airways.  Consolidation in the left   upper lobe. Bilateral upper lobe groundglass opacities.  PLEURA: Trace left pleural effusion.  MEDIASTINUM AND PIO: Mildly enlarged mediastinal lymph nodes, which may   be reactive.  VESSELS: Atherosclerotic changes.  HEART: Cardiomegaly. No pericardial effusion. Coronary artery an mitral   annular calcification.  CHEST WALL AND LOWER NECK: Within normal limits.  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  BONES: Degenerative changes.    IMPRESSION:  Left upper lobe consolidation, suspicious for pneumonia in the   appropriate clinical setting.    Bilateral upper lobe groundglass opacities, which could represent   superimposed pulmonary edema or infection.    Recommend follow-up to resolution.      Advanced directives addressed: full resuscitation
HOSPITALIST PROGRESS NOTE:  SUBJECTIVE:  PCP:  Chief Complaint: Patient is a 88y old  Male who presents with a chief complaint of Cough, worsening SOB (25 Jul 2023 11:43)      HPI:  87yo man with PMH ILD/ chronic resp failure on home O2, hyperlipidemia presented with worsening SOB and productive cough for about a week. Patient states he usually uses oxygen as needed, but last few days was getting more SOB and was using oxygen more. He admit to having cough productive of yellowish sputum. No hemoptysis. Never been an active smoker, but has been passive smoker for many years. No fever/ chills (24 Jul 2023 16:09)    7/26- history as above- patient states that his breathing is improved- cough improved as well.         Vital Signs Last 24 Hrs  T(C): 36.2 (26 Jul 2023 08:18), Max: 36.9 (25 Jul 2023 15:00)  T(F): 97.2 (26 Jul 2023 08:18), Max: 98.4 (25 Jul 2023 15:00)  HR: 71 (26 Jul 2023 08:18) (70 - 81)  BP: 124/64 (26 Jul 2023 08:18) (100/51 - 124/64)  BP(mean): --  RR: 18 (26 Jul 2023 08:18) (18 - 18)  SpO2: 95% (26 Jul 2023 08:18) (95% - 98%)    Parameters below as of 26 Jul 2023 08:18  Patient On (Oxygen Delivery Method): nasal cannula  O2 Flow (L/min): 3      ROS:   All 10 systems reviewed and found to be negative with the exception of what has been described above.     Constitutional: NAD, awake and alert- OOB to chair   Neurological: AAO x 3, no focal deficits  HEENT: PERRLA, EOMI, MMM  Neck: Soft and supple, No LAD, No JVD  Respiratory: +decreased Breath sounds are clear bilaterally, No wheezing, rales or rhonchi  Cardiovascular: S1 and S2, regular rate and rhythm; no Murmurs, gallops or rubs  Gastrointestinal: Bowel Sounds present, soft, nontender, nondistended, no guarding, no rebound tenderness  Back: No CVA tenderness   Extremities: No peripheral edema  Vascular: 2+ peripheral pulses  Musculoskeletal: 5/5 strength b/l upper and lower extremities  Skin: No rashes    MEDICATIONS  (STANDING):  albuterol    0.083% 2.5 milliGRAM(s) Nebulizer every 6 hours  aspirin enteric coated 81 milliGRAM(s) Oral daily  atorvastatin 10 milliGRAM(s) Oral at bedtime  azithromycin  IVPB 500 milliGRAM(s) IV Intermittent every 24 hours  benzonatate 100 milliGRAM(s) Oral three times a day  budesonide 160 MICROgram(s)/formoterol 4.5 MICROgram(s) Inhaler 2 Puff(s) Inhalation two times a day  cefTRIAXone Injectable. 1000 milliGRAM(s) IV Push every 24 hours  enoxaparin Injectable 40 milliGRAM(s) SubCutaneous every 24 hours  methylPREDNISolone sodium succinate Injectable 40 milliGRAM(s) IV Push every 8 hours  potassium chloride    Tablet ER 40 milliEquivalent(s) Oral once    MEDICATIONS  (PRN):  acetaminophen     Tablet .. 650 milliGRAM(s) Oral every 6 hours PRN Mild Pain (1 - 3)  aluminum hydroxide/magnesium hydroxide/simethicone Suspension 30 milliLiter(s) Oral every 4 hours PRN Dyspepsia  guaiFENesin Oral Liquid (Sugar-Free) 200 milliGRAM(s) Oral every 6 hours PRN Cough  melatonin 3 milliGRAM(s) Oral at bedtime PRN Insomnia  ondansetron Injectable 4 milliGRAM(s) IV Push every 8 hours PRN Nausea and/or Vomiting      LABS: All Labs Reviewed:      07-26    137  |  107  |  27<H>  ----------------------------<  207<H>  3.4<L>   |  21<L>  |  0.95    Ca    8.8      26 Jul 2023 06:24  Phos  3.2     07-26  Mg     2.5     07-26                            12.6   3.88  )-----------( 130      ( 26 Jul 2023 06:24 )             37.8              Urinalysis Basic - ( 25 Jul 2023 06:51 )    Color: x / Appearance: x / SG: x / pH: x  Gluc: 114 mg/dL / Ketone: x  / Bili: x / Urobili: x   Blood: x / Protein: x / Nitrite: x   Leuk Esterase: x / RBC: x / WBC x   Sq Epi: x / Non Sq Epi: x / Bacteria: x      RADIOLOGY/EKG:    < from: CT Chest No Cont (07.24.23 @ 20:08) >    IMPRESSION:  Left upper lobe consolidation, suspicious for pneumonia in the   appropriate clinical setting.    Bilateral upper lobe groundglass opacities, which could represent   superimposed pulmonary edema or infection.    Recommend follow-up to resolution.    < end of copied text >  < from: Xray Chest 1 View- PORTABLE-Urgent (07.24.23 @ 12:01) >    IMPRESSION: Fairly large left perihilar infiltrate and mild atelectasis   off right hilum presently seen.    < end of copied text >          
Patient is a 88y old  Male who presents with a chief complaint of Cough, worsening SOB (2023 16:09)      HPI:  87yo/M with PMH ILD/ chronic resp failure on home O2, hyperlipidemia presented with worsening SOB and productive cough for about a week. Patient states he usually uses oxygen as needed, but last few days was getting more SOB and was using oxygen more. He admit to having cough productive of yellowish sputum. No hemoptysis. Never been an active smoker, but has been passive smoker for many years. No fever/ chills (2023 16:09)      PAST MEDICAL & SURGICAL HISTORY:  Squamous cell carcinoma      [place] (hard of hearing)  uses hearing aid      Arthritis      Deep vein thrombosis (DVT)  Right leg  ? unknown cause      Left carpal tunnel syndrome      H/O inguinal hernia repair  2017 right      H/O colonoscopy  2014      H/O squamous cell carcinoma excision  Bilateral ear 2018      S/P cataract surgery  2009 bilateral      S/P foot surgery, right  1968      S/P arthroscopy of right shoulder  2019      History of carpal tunnel surgery of right wrist  2019          PREVIOUS DIAGNOSTIC TESTING:      MEDICATIONS  (STANDING):  albuterol    0.083% 2.5 milliGRAM(s) Nebulizer every 6 hours  aspirin enteric coated 81 milliGRAM(s) Oral daily  atorvastatin 10 milliGRAM(s) Oral at bedtime  azithromycin  IVPB 500 milliGRAM(s) IV Intermittent every 24 hours  benzonatate 100 milliGRAM(s) Oral three times a day  budesonide 160 MICROgram(s)/formoterol 4.5 MICROgram(s) Inhaler 2 Puff(s) Inhalation two times a day  cefTRIAXone Injectable. 1000 milliGRAM(s) IV Push every 24 hours  enoxaparin Injectable 40 milliGRAM(s) SubCutaneous every 24 hours  methylPREDNISolone sodium succinate Injectable 40 milliGRAM(s) IV Push every 8 hours    MEDICATIONS  (PRN):  acetaminophen     Tablet .. 650 milliGRAM(s) Oral every 6 hours PRN Mild Pain (1 - 3)  aluminum hydroxide/magnesium hydroxide/simethicone Suspension 30 milliLiter(s) Oral every 4 hours PRN Dyspepsia  guaiFENesin Oral Liquid (Sugar-Free) 200 milliGRAM(s) Oral every 6 hours PRN Cough  melatonin 3 milliGRAM(s) Oral at bedtime PRN Insomnia  ondansetron Injectable 4 milliGRAM(s) IV Push every 8 hours PRN Nausea and/or Vomiting      FAMILY HISTORY:  FH: bladder cancer  father    FHx: uterine cancer  sister        SOCIAL HISTORY:  ***    REVIEW OF SYSTEM:  Pertinent items are noted in HPI.  Constitutional negative for chills, fevers, sweats and weight loss  throat, and face:  negative for epistaxis, nasal congestion, sore throat and   tinnitus  Respiratory: pos for cough, dyspnea on exertion,no pleuritic chest pain  and wheezing  Cardiovascular:  negative for chest pain, pos dyspnea and palpitations  Gastrointestinal: negative for abdominal pain, diarrhea, nausea and vomiting  Genitourinary: negative for dysuria, frequency and urinary incontinence  Skin:  negative for redness, rash, pruritus, swelling, dryness and   fissures  Hematologic/lymphatic: negative for bleeding and easy bruising  Musculoskeletal: negative for arthralgias, back pain and muscle weakness  Neurological: negative for dizziness, headaches, seizures and tremors  Behavioral/Psych:  negative for mood change, depression, suicidal attempts    Allergic/Immunologic: negative for anaphylaxis, angioedema and urticaria    Vital Signs Last 24 Hrs  T(C): 36.9 (2023 00:12), Max: 36.9 (2023 15:00)  T(F): 98.4 (2023 00:12), Max: 98.4 (2023 15:00)  HR: 70 (2023 01:11) (70 - 86)  BP: 118/59 (2023 00:12) (100/51 - 120/62)  BP(mean): --  RR: 18 (2023 00:12) (18 - 20)  SpO2: 98% (2023 01:11) (94% - 98%)    Parameters below as of 2023 01:11  Patient On (Oxygen Delivery Method): nasal cannula, 3 L            PHYSICAL EXAM  General Appearance: cooperative, no acute distress, non toxic or ill appearing  HEENT: PERRL, conjunctiva clear, EOM's intact, non injected pharynx, no exudate, TM   normal  Neck: Supple, , no adenopathy, thyroid: not enlarged, no carotid bruit or JVD  Back: Symmetric, no  tenderness,no soft tissue tenderness  Lungs: velcro type crackles and L>R rhonchi on exam  Heart: Regular rate and rhythm, S1, S2 normal, no murmur, rub or gallop  Abdomen: Soft, non-tender, bowel sounds active , no hepatosplenomegaly  Extremities: no cyanosis or edema, no joint swelling  Skin: Skin color, texture normal, no rashes   Neurologic: Alert and oriented X3 , cranial nerves intact, sensory and motor normal,    ECG:    LABS:                          11.2   8.72  )-----------( 111      ( 2023 06:51 )             32.8         134<L>  |  104  |  27<H>  ----------------------------<  141<H>  3.5   |  21<L>  |  1.12    Ca    8.2<L>      2023 11:30    TPro  6.8  /  Alb  3.1<L>  /  TBili  1.2  /  DBili  x   /  AST  28  /  ALT  27  /  AlkPhos  82              PT/INR - ( 2023 11:30 )   PT: 16.1 sec;   INR: 1.38 ratio         PTT - ( 2023 11:30 )  PTT:32.1 sec  Urinalysis Basic - ( 2023 14:27 )    Color: Yellow / Appearance: Slightly Turbid / S.015 / pH: x  Gluc: x / Ketone: Trace  / Bili: Negative / Urobili: Negative   Blood: x / Protein: 30 mg/dL / Nitrite: Negative   Leuk Esterase: Negative / RBC: 3-5 /HPF / WBC 0-2 /HPF   Sq Epi: x / Non Sq Epi: x / Bacteria: Moderate      < from: CT Chest No Cont (23 @ 20:08) >  IMPRESSION:  Left upper lobe consolidation, suspicious for pneumonia in the   appropriate clinical setting.    Bilateral upper lobe groundglass opacities, which could represent   superimposed pulmonary edema or infection.    Recommend follow-up to resolution.    < end of copied text >  < from: Xray Chest 1 View- PORTABLE-Urgent (23 @ 12:01) >  PROCEDURE DATE:  2023          INTERPRETATION:  AP chest on 2023 11:54 AM. Patient has sepsis.    Elevated right hemidiaphragm again noted.    Present film shows some linear atelectasis off the right hilum and a   sizable left perihilar infiltrate new since 2009.    IMPRESSION: Fairly large left perihilar infiltrate and mild atelectasis   off right hilum presently seen.    < end of copied text >        RADIOLOGY & ADDITIONAL STUDIES:    
HOSPITALIST PROGRESS NOTE:  SUBJECTIVE:  PCP:  Chief Complaint: Patient is a 88y old  Male who presents with a chief complaint of Cough, worsening SOB (25 Jul 2023 11:43)      HPI:  89yo/M with PMH ILD/ chronic resp failure on home O2, hyperlipidemia presented with worsening SOB and productive cough for about a week. Patient states he usually uses oxygen as needed, but last few days was getting more SOB and was using oxygen more. He admit to having cough productive of yellowish sputum. No hemoptysis. Never been an active smoker, but has been passive smoker for many years. No fever/ chills (24 Jul 2023 16:09)    7/25: Above reviewed; patient short of breath; using his abd muscles; states that he is short of breath; No CP       Allergies:  No Known Allergies    REVIEW OF SYSTEMS:  See HPI. All other review of systems is negative unless indicated above.     OBJECTIVE  Physical Exam:  Vital Signs:    Vital Signs Last 24 Hrs  T(C): 36.8 (25 Jul 2023 08:25), Max: 37.3 (24 Jul 2023 20:00)  T(F): 98.2 (25 Jul 2023 08:25), Max: 99.1 (24 Jul 2023 20:00)  HR: 86 (25 Jul 2023 08:25) (72 - 87)  BP: 120/62 (25 Jul 2023 08:25) (107/70 - 120/62)  BP(mean): 76 (24 Jul 2023 16:17) (76 - 76)  RR: 20 (25 Jul 2023 08:25) (20 - 22)  SpO2: 94% (25 Jul 2023 08:25) (94% - 100%)    Parameters below as of 25 Jul 2023 08:25  Patient On (Oxygen Delivery Method): nasal cannula  O2 Flow (L/min): 3    I&O's Summary    24 Jul 2023 07:01  -  25 Jul 2023 07:00  --------------------------------------------------------  IN: 0 mL / OUT: 300 mL / NET: -300 mL    Constitutional: NAD, awake and alert  Neurological: AAO x 3, no focal deficits  HEENT: PERRLA, EOMI, MMM  Neck: Soft and supple, No LAD, No JVD  Respiratory: +decreased Breath sounds are clear bilaterally, No wheezing, rales or rhonchi  Cardiovascular: S1 and S2, regular rate and rhythm; no Murmurs, gallops or rubs  Gastrointestinal: Bowel Sounds present, soft, nontender, nondistended, no guarding, no rebound tenderness  Back: No CVA tenderness   Extremities: No peripheral edema  Vascular: 2+ peripheral pulses  Musculoskeletal: 5/5 strength b/l upper and lower extremities  Skin: No rashes  Breast: Deferred  Rectal: Deferred    MEDICATIONS  (STANDING):  albuterol    0.083% 2.5 milliGRAM(s) Nebulizer every 6 hours  aspirin enteric coated 81 milliGRAM(s) Oral daily  atorvastatin 10 milliGRAM(s) Oral at bedtime  azithromycin  IVPB 500 milliGRAM(s) IV Intermittent every 24 hours  benzonatate 100 milliGRAM(s) Oral three times a day  budesonide 160 MICROgram(s)/formoterol 4.5 MICROgram(s) Inhaler 2 Puff(s) Inhalation two times a day  cefTRIAXone Injectable. 1000 milliGRAM(s) IV Push every 24 hours  enoxaparin Injectable 40 milliGRAM(s) SubCutaneous every 24 hours  methylPREDNISolone sodium succinate Injectable 125 milliGRAM(s) IV Push once  methylPREDNISolone sodium succinate Injectable 40 milliGRAM(s) IV Push every 8 hours      LABS: All Labs Reviewed:                        11.2   8.72  )-----------( 111      ( 25 Jul 2023 06:51 )             32.8     07-25    134<L>  |  106  |  24<H>  ----------------------------<  114<H>  3.6   |  20<L>  |  1.00    Ca    8.1<L>      25 Jul 2023 06:51    TPro  6.8  /  Alb  3.1<L>  /  TBili  1.2  /  DBili  x   /  AST  28  /  ALT  27  /  AlkPhos  82  07-24    PT/INR - ( 24 Jul 2023 11:30 )   PT: 16.1 sec;   INR: 1.38 ratio         PTT - ( 24 Jul 2023 11:30 )  PTT:32.1 sec        Urinalysis Basic - ( 25 Jul 2023 06:51 )    Color: x / Appearance: x / SG: x / pH: x  Gluc: 114 mg/dL / Ketone: x  / Bili: x / Urobili: x   Blood: x / Protein: x / Nitrite: x   Leuk Esterase: x / RBC: x / WBC x   Sq Epi: x / Non Sq Epi: x / Bacteria: x      RADIOLOGY/EKG:    < from: CT Chest No Cont (07.24.23 @ 20:08) >    IMPRESSION:  Left upper lobe consolidation, suspicious for pneumonia in the   appropriate clinical setting.    Bilateral upper lobe groundglass opacities, which could represent   superimposed pulmonary edema or infection.    Recommend follow-up to resolution.    < end of copied text >  < from: Xray Chest 1 View- PORTABLE-Urgent (07.24.23 @ 12:01) >    IMPRESSION: Fairly large left perihilar infiltrate and mild atelectasis   off right hilum presently seen.    < end of copied text >          
HOSPITALIST PROGRESS NOTE:  SUBJECTIVE:  PCP:  Chief Complaint: Patient is a 88y old  Male who presents with a chief complaint of Cough, worsening SOB (25 Jul 2023 11:43)      HPI:  87yo man with PMH ILD/ chronic resp failure on home O2, hyperlipidemia presented with worsening SOB and productive cough for about a week. Patient states he usually uses oxygen as needed, but last few days was getting more SOB and was using oxygen more. He admit to having cough productive of yellowish sputum. No hemoptysis. Never been an active smoker, but has been passive smoker for many years. No fever/ chills (24 Jul 2023 16:09)    7/26- history as above- patient states that his breathing is improved- cough improved as well.   7/27- OOB to chair- on supplemental O2- as per report- feels better compared to yesterday. VSS         Vital Signs Last 24 Hrs  T(C): 36.3 (26 Jul 2023 22:05), Max: 36.4 (26 Jul 2023 16:15)  T(F): 97.3 (26 Jul 2023 22:05), Max: 97.5 (26 Jul 2023 16:15)  HR: 72 (27 Jul 2023 09:41) (69 - 78)  BP: 118/62 (27 Jul 2023 07:22) (106/60 - 118/62)  BP(mean): --  RR: 18 (27 Jul 2023 07:22) (17 - 18)  SpO2: 97% (27 Jul 2023 07:22) (94% - 100%)    Parameters below as of 27 Jul 2023 07:22  Patient On (Oxygen Delivery Method): nasal cannula  O2 Flow (L/min): 3        ROS:   All 10 systems reviewed and found to be negative with the exception of what has been described above.     Constitutional: NAD, awake and alert- OOB to chair   Neurological: AAO x 3, no focal deficits  HEENT: PERRLA, EOMI, MMM  Neck: Soft and supple, No LAD, No JVD  Respiratory: +decreased Breath sounds are clear bilaterally, No wheezing, rales or rhonchi  Cardiovascular: S1 and S2, regular rate and rhythm; no Murmurs, gallops or rubs  Gastrointestinal: Bowel Sounds present, soft, nontender, nondistended, no guarding, no rebound tenderness  Back: No CVA tenderness   Extremities: No peripheral edema  Vascular: 2+ peripheral pulses  Musculoskeletal: 5/5 strength b/l upper and lower extremities  Skin: No rashes    MEDICATIONS  (STANDING):  albuterol    0.083% 2.5 milliGRAM(s) Nebulizer every 6 hours  aspirin enteric coated 81 milliGRAM(s) Oral daily  atorvastatin 10 milliGRAM(s) Oral at bedtime  azithromycin  IVPB 500 milliGRAM(s) IV Intermittent every 24 hours  benzonatate 100 milliGRAM(s) Oral three times a day  budesonide 160 MICROgram(s)/formoterol 4.5 MICROgram(s) Inhaler 2 Puff(s) Inhalation two times a day  cefTRIAXone Injectable. 1000 milliGRAM(s) IV Push every 24 hours  enoxaparin Injectable 40 milliGRAM(s) SubCutaneous every 24 hours  methylPREDNISolone sodium succinate Injectable 40 milliGRAM(s) IV Push every 12 hours    MEDICATIONS  (PRN):  acetaminophen     Tablet .. 650 milliGRAM(s) Oral every 6 hours PRN Mild Pain (1 - 3)  aluminum hydroxide/magnesium hydroxide/simethicone Suspension 30 milliLiter(s) Oral every 4 hours PRN Dyspepsia  guaiFENesin Oral Liquid (Sugar-Free) 200 milliGRAM(s) Oral every 6 hours PRN Cough  melatonin 3 milliGRAM(s) Oral at bedtime PRN Insomnia  ondansetron Injectable 4 milliGRAM(s) IV Push every 8 hours PRN Nausea and/or Vomiting        LABS: All Labs Reviewed:        07-26    137  |  107  |  27<H>  ----------------------------<  207<H>  3.4<L>   |  21<L>  |  0.95    Ca    8.8      26 Jul 2023 06:24  Phos  3.2     07-26  Mg     2.5     07-26                            12.6   3.88  )-----------( 130      ( 26 Jul 2023 06:24 )             37.8              Urinalysis Basic - ( 25 Jul 2023 06:51 )    Color: x / Appearance: x / SG: x / pH: x  Gluc: 114 mg/dL / Ketone: x  / Bili: x / Urobili: x   Blood: x / Protein: x / Nitrite: x   Leuk Esterase: x / RBC: x / WBC x   Sq Epi: x / Non Sq Epi: x / Bacteria: x      RADIOLOGY/EKG:    < from: CT Chest No Cont (07.24.23 @ 20:08) >    IMPRESSION:  Left upper lobe consolidation, suspicious for pneumonia in the   appropriate clinical setting.    Bilateral upper lobe groundglass opacities, which could represent   superimposed pulmonary edema or infection.    Recommend follow-up to resolution.    < end of copied text >  < from: Xray Chest 1 View- PORTABLE-Urgent (07.24.23 @ 12:01) >    IMPRESSION: Fairly large left perihilar infiltrate and mild atelectasis   off right hilum presently seen.    < end of copied text >          
Date of service: 07-26-23 @ 15:23    pt seen and examined  feels better   less cough  no resp distress     ROS: no fever or chills; denies dizziness, no HA, no abdominal pain, no diarrhea or constipation; no dysuria, no urinary frequency, no legs pain, no rashes    MEDICATIONS  (STANDING):  albuterol    0.083% 2.5 milliGRAM(s) Nebulizer every 6 hours  aspirin enteric coated 81 milliGRAM(s) Oral daily  atorvastatin 10 milliGRAM(s) Oral at bedtime  azithromycin  IVPB 500 milliGRAM(s) IV Intermittent every 24 hours  benzonatate 100 milliGRAM(s) Oral three times a day  budesonide 160 MICROgram(s)/formoterol 4.5 MICROgram(s) Inhaler 2 Puff(s) Inhalation two times a day  cefTRIAXone Injectable. 1000 milliGRAM(s) IV Push every 24 hours  enoxaparin Injectable 40 milliGRAM(s) SubCutaneous every 24 hours  methylPREDNISolone sodium succinate Injectable 40 milliGRAM(s) IV Push every 8 hours    Vital Signs Last 24 Hrs  T(C): 36.2 (26 Jul 2023 08:18), Max: 36.9 (26 Jul 2023 00:12)  T(F): 97.2 (26 Jul 2023 08:18), Max: 98.4 (26 Jul 2023 00:12)  HR: 71 (26 Jul 2023 08:18) (70 - 75)  BP: 124/64 (26 Jul 2023 08:18) (118/59 - 124/64)  BP(mean): --  RR: 18 (26 Jul 2023 08:18) (18 - 18)  SpO2: 95% (26 Jul 2023 08:18) (95% - 98%)    Parameters below as of 26 Jul 2023 08:18  Patient On (Oxygen Delivery Method): nasal cannula  O2 Flow (L/min): 3    PE:  Constitutional: NAD   HEENT: NC/AT, EOMI, PERRLA, conjunctivae clear; ears and nose atraumatic; pharynx benign  Neck: supple; thyroid not palpable  Back: no tenderness  Respiratory: decreased breath sounds   Cardiovascular: S1S2 regular, no murmurs  Abdomen: soft, not tender, not distended, positive BS; liver and spleen WNL  Genitourinary: no suprapubic tenderness  Lymphatic: no LN palpable  Musculoskeletal: no muscle tenderness, no joint swelling or tenderness  Extremities: no pedal edema  Neurological/ Psychiatric: AxOx3, Judgement and insight normal;  moving all extremities  Skin: no rashes; no palpable lesions    Labs: all available labs reviewed                                   12.6   3.88  )-----------( 130      ( 26 Jul 2023 06:24 )             37.8     07-26    137  |  107  |  27<H>  ----------------------------<  207<H>  3.4<L>   |  21<L>  |  0.95    Ca    8.8      26 Jul 2023 06:24  Phos  3.2     07-26  Mg     2.5     07-26      Culture - Urine (07.24.23 @ 14:27)   Specimen Source: Clean Catch Clean Catch (Midstream)  Culture Results:   No growth  Culture - Blood (07.24.23 @ 11:30)   Specimen Source: .Blood Blood-Peripheral  Culture Results:   No growth at 48 Hours      Radiology: all available radiological tests reviewed  < from: CT Chest No Cont (07.24.23 @ 20:08) >    ACC: [id number] EXAM:  CT CHEST   ORDERED BY: KIMBERLY BOYD     PROCEDURE DATE:  07/24/2023          INTERPRETATION:  CLINICAL INFORMATION: Sepsis. Shortness of breath.   Productive cough. Evaluate lung density on x-ray.    COMPARISON: Chest radiograph 7/24/2023    CONTRAST/COMPLICATIONS:  IV Contrast: NONE  Oral Contrast: NONE  Complications: None reported at time of study completion    PROCEDURE:  CT of the Chest was performed.  Sagittal and coronal reformats were performed.    FINDINGS:    LUNGS AND AIRWAYS: Patent central airways.  Consolidation in the left   upper lobe. Bilateral upper lobe groundglass opacities.  PLEURA: Trace left pleural effusion.  MEDIASTINUM AND PIO: Mildly enlarged mediastinal lymph nodes, which may   be reactive.  VESSELS: Atherosclerotic changes.  HEART: Cardiomegaly. No pericardial effusion. Coronary artery an mitral   annular calcification.  CHEST WALL AND LOWER NECK: Within normal limits.  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  BONES: Degenerative changes.    IMPRESSION:  Left upper lobe consolidation, suspicious for pneumonia in the   appropriate clinical setting.    Bilateral upper lobe groundglass opacities, which could represent   superimposed pulmonary edema or infection.    Recommend follow-up to resolution.      Advanced directives addressed: full resuscitation
Date of service: 07-28-23 @ 10:24    pt seen and examined  feels better   less cough  walking on floor   no fevers    ROS: no fever or chills; denies dizziness, no HA, no abdominal pain, no diarrhea or constipation; no dysuria, no urinary frequency, no legs pain, no rashes      MEDICATIONS  (STANDING):  albuterol    0.083% 2.5 milliGRAM(s) Nebulizer every 6 hours  aspirin enteric coated 81 milliGRAM(s) Oral daily  atorvastatin 10 milliGRAM(s) Oral at bedtime  benzonatate 100 milliGRAM(s) Oral three times a day  budesonide 160 MICROgram(s)/formoterol 4.5 MICROgram(s) Inhaler 2 Puff(s) Inhalation two times a day  cefTRIAXone Injectable. 1000 milliGRAM(s) IV Push every 24 hours  enoxaparin Injectable 40 milliGRAM(s) SubCutaneous every 24 hours  methylPREDNISolone sodium succinate Injectable 40 milliGRAM(s) IV Push every 12 hours    Vital Signs Last 24 Hrs  T(C): 36.6 (28 Jul 2023 08:22), Max: 36.6 (28 Jul 2023 08:22)  T(F): 97.8 (28 Jul 2023 08:22), Max: 97.8 (28 Jul 2023 08:22)  HR: 74 (28 Jul 2023 08:48) (69 - 81)  BP: 111/62 (28 Jul 2023 08:22) (103/58 - 111/62)  BP(mean): --  RR: 18 (28 Jul 2023 08:22) (18 - 18)  SpO2: 98% (28 Jul 2023 08:48) (93% - 98%)    Parameters below as of 28 Jul 2023 08:22  Patient On (Oxygen Delivery Method): nasal cannula  O2 Flow (L/min): 4      PE:  Constitutional: NAD   HEENT: NC/AT, EOMI, PERRLA, conjunctivae clear; ears and nose atraumatic; pharynx benign  Neck: supple; thyroid not palpable  Back: no tenderness  Respiratory: decreased breath sounds   Cardiovascular: S1S2 regular, no murmurs  Abdomen: soft, not tender, not distended, positive BS; liver and spleen WNL  Genitourinary: no suprapubic tenderness  Lymphatic: no LN palpable  Musculoskeletal: no muscle tenderness, no joint swelling or tenderness  Extremities: no pedal edema  Neurological/ Psychiatric: AxOx3, Judgement and insight normal;  moving all extremities  Skin: no rashes; no palpable lesions    Labs: all available labs reviewed                 Culture - Urine (07.24.23 @ 14:27)   Specimen Source: Clean Catch Clean Catch (Midstream)  Culture Results:   No growth  Culture - Blood (07.24.23 @ 11:30)   Specimen Source: .Blood Blood-Peripheral  Culture Results:   No growth at 48 Hours      Radiology: all available radiological tests reviewed  < from: CT Chest No Cont (07.24.23 @ 20:08) >    ACC: [id number] EXAM:  CT CHEST   ORDERED BY: KIMBERLY BOYD     PROCEDURE DATE:  07/24/2023          INTERPRETATION:  CLINICAL INFORMATION: Sepsis. Shortness of breath.   Productive cough. Evaluate lung density on x-ray.    COMPARISON: Chest radiograph 7/24/2023    CONTRAST/COMPLICATIONS:  IV Contrast: NONE  Oral Contrast: NONE  Complications: None reported at time of study completion    PROCEDURE:  CT of the Chest was performed.  Sagittal and coronal reformats were performed.    FINDINGS:    LUNGS AND AIRWAYS: Patent central airways.  Consolidation in the left   upper lobe. Bilateral upper lobe groundglass opacities.  PLEURA: Trace left pleural effusion.  MEDIASTINUM AND PIO: Mildly enlarged mediastinal lymph nodes, which may   be reactive.  VESSELS: Atherosclerotic changes.  HEART: Cardiomegaly. No pericardial effusion. Coronary artery an mitral   annular calcification.  CHEST WALL AND LOWER NECK: Within normal limits.  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  BONES: Degenerative changes.    IMPRESSION:  Left upper lobe consolidation, suspicious for pneumonia in the   appropriate clinical setting.    Bilateral upper lobe groundglass opacities, which could represent   superimposed pulmonary edema or infection.    Recommend follow-up to resolution.      Advanced directives addressed: full resuscitation
Patient is a 88y old  Male who presents with a chief complaint of Cough, worsening SOB (2023 16:09)      HPI:  89yo/M with PMH ILD/ chronic resp failure on home O2, hyperlipidemia presented with worsening SOB and productive cough for about a week. Patient states he usually uses oxygen as needed, but last few days was getting more SOB and was using oxygen more. He admit to having cough productive of yellowish sputum. No hemoptysis. Never been an active smoker, but has been passive smoker for many years. No fever/ chills (2023 16:09)      PAST MEDICAL & SURGICAL HISTORY:  Squamous cell carcinoma      [place] (hard of hearing)  uses hearing aid      Arthritis      Deep vein thrombosis (DVT)  Right leg  ? unknown cause      Left carpal tunnel syndrome      H/O inguinal hernia repair  2017 right      H/O colonoscopy  2014      H/O squamous cell carcinoma excision  Bilateral ear 2018      S/P cataract surgery  2009 bilateral      S/P foot surgery, right  1968      S/P arthroscopy of right shoulder  2019      History of carpal tunnel surgery of right wrist  2019          PREVIOUS DIAGNOSTIC TESTING:      MEDICATIONS  (STANDING):  albuterol    0.083% 2.5 milliGRAM(s) Nebulizer every 6 hours  aspirin enteric coated 81 milliGRAM(s) Oral daily  atorvastatin 10 milliGRAM(s) Oral at bedtime  azithromycin  IVPB 500 milliGRAM(s) IV Intermittent every 24 hours  benzonatate 100 milliGRAM(s) Oral three times a day  budesonide 160 MICROgram(s)/formoterol 4.5 MICROgram(s) Inhaler 2 Puff(s) Inhalation two times a day  cefTRIAXone Injectable. 1000 milliGRAM(s) IV Push every 24 hours  enoxaparin Injectable 40 milliGRAM(s) SubCutaneous every 24 hours  methylPREDNISolone sodium succinate Injectable 40 milliGRAM(s) IV Push every 8 hours    MEDICATIONS  (PRN):  acetaminophen     Tablet .. 650 milliGRAM(s) Oral every 6 hours PRN Mild Pain (1 - 3)  aluminum hydroxide/magnesium hydroxide/simethicone Suspension 30 milliLiter(s) Oral every 4 hours PRN Dyspepsia  guaiFENesin Oral Liquid (Sugar-Free) 200 milliGRAM(s) Oral every 6 hours PRN Cough  melatonin 3 milliGRAM(s) Oral at bedtime PRN Insomnia  ondansetron Injectable 4 milliGRAM(s) IV Push every 8 hours PRN Nausea and/or Vomiting      FAMILY HISTORY:  FH: bladder cancer  father    FHx: uterine cancer  sister        SOCIAL HISTORY:  ***    REVIEW OF SYSTEM:  Pertinent items are noted in HPI.  Constitutional negative for chills, fevers, sweats and weight loss  throat, and face:  negative for epistaxis, nasal congestion, sore throat and   tinnitus  Respiratory: pos for cough, dyspnea on exertion,no pleuritic chest pain  and wheezing  Cardiovascular:  negative for chest pain, pos dyspnea and palpitations  Gastrointestinal: negative for abdominal pain, diarrhea, nausea and vomiting  Genitourinary: negative for dysuria, frequency and urinary incontinence  Skin:  negative for redness, rash, pruritus, swelling, dryness and   fissures  Hematologic/lymphatic: negative for bleeding and easy bruising  Musculoskeletal: negative for arthralgias, back pain and muscle weakness  Neurological: negative for dizziness, headaches, seizures and tremors  Behavioral/Psych:  negative for mood change, depression, suicidal attempts    Allergic/Immunologic: negative for anaphylaxis, angioedema and urticaria    Vital Signs Last 24 Hrs  T(C): 36.3 (2023 22:00), Max: 36.3 (2023 15:10)  T(F): 97.3 (2023 22:00), Max: 97.3 (2023 15:10)  HR: 69 (2023 01:43) (69 - 81)  BP: 103/58 (2023 22:00) (103/58 - 115/56)  BP(mean): --  RR: 18 (2023 22:00) (18 - 18)  SpO2: 96% (2023 01:43) (95% - 99%)    Parameters below as of 2023 01:43  Patient On (Oxygen Delivery Method): nasal cannula              PHYSICAL EXAM  General Appearance: cooperative, no acute distress, non toxic or ill appearing  HEENT: PERRL, conjunctiva clear, EOM's intact, non injected pharynx, no exudate, TM   normal  Neck: Supple, , no adenopathy, thyroid: not enlarged, no carotid bruit or JVD  Back: Symmetric, no  tenderness,no soft tissue tenderness  Lungs: velcro type crackles and L>R rhonchi on exam  Heart: Regular rate and rhythm, S1, S2 normal, no murmur, rub or gallop  Abdomen: Soft, non-tender, bowel sounds active , no hepatosplenomegaly  Extremities: no cyanosis or edema, no joint swelling  Skin: Skin color, texture normal, no rashes   Neurologic: Alert and oriented X3 , cranial nerves intact, sensory and motor normal,    ECG:    LABS:                          11.2   8.72  )-----------( 111      ( 2023 06:51 )             32.8         134<L>  |  104  |  27<H>  ----------------------------<  141<H>  3.5   |  21<L>  |  1.12    Ca    8.2<L>      2023 11:30    TPro  6.8  /  Alb  3.1<L>  /  TBili  1.2  /  DBili  x   /  AST  28  /  ALT  27  /  AlkPhos  82              PT/INR - ( 2023 11:30 )   PT: 16.1 sec;   INR: 1.38 ratio         PTT - ( 2023 11:30 )  PTT:32.1 sec  Urinalysis Basic - ( 2023 14:27 )    Color: Yellow / Appearance: Slightly Turbid / S.015 / pH: x  Gluc: x / Ketone: Trace  / Bili: Negative / Urobili: Negative   Blood: x / Protein: 30 mg/dL / Nitrite: Negative   Leuk Esterase: Negative / RBC: 3-5 /HPF / WBC 0-2 /HPF   Sq Epi: x / Non Sq Epi: x / Bacteria: Moderate      < from: CT Chest No Cont (23 @ 20:08) >  IMPRESSION:  Left upper lobe consolidation, suspicious for pneumonia in the   appropriate clinical setting.    Bilateral upper lobe groundglass opacities, which could represent   superimposed pulmonary edema or infection.    Recommend follow-up to resolution.    < end of copied text >  < from: Xray Chest 1 View- PORTABLE-Urgent (23 @ 12:01) >  PROCEDURE DATE:  2023          INTERPRETATION:  AP chest on 2023 11:54 AM. Patient has sepsis.    Elevated right hemidiaphragm again noted.    Present film shows some linear atelectasis off the right hilum and a   sizable left perihilar infiltrate new since 2009.    IMPRESSION: Fairly large left perihilar infiltrate and mild atelectasis   off right hilum presently seen.    < end of copied text >        RADIOLOGY & ADDITIONAL STUDIES:    
Patient is a 88y old  Male who presents with a chief complaint of Cough, worsening SOB (2023 16:09)      HPI:  89yo/M with PMH ILD/ chronic resp failure on home O2, hyperlipidemia presented with worsening SOB and productive cough for about a week. Patient states he usually uses oxygen as needed, but last few days was getting more SOB and was using oxygen more. He admit to having cough productive of yellowish sputum. No hemoptysis. Never been an active smoker, but has been passive smoker for many years. No fever/ chills (2023 16:09)      PAST MEDICAL & SURGICAL HISTORY:  Squamous cell carcinoma      [place] (hard of hearing)  uses hearing aid      Arthritis      Deep vein thrombosis (DVT)  Right leg  ? unknown cause      Left carpal tunnel syndrome      H/O inguinal hernia repair  2017 right      H/O colonoscopy  2014      H/O squamous cell carcinoma excision  Bilateral ear 2018      S/P cataract surgery  2009 bilateral      S/P foot surgery, right  1968      S/P arthroscopy of right shoulder  2019      History of carpal tunnel surgery of right wrist  2019          PREVIOUS DIAGNOSTIC TESTING:      MEDICATIONS  (STANDING):  albuterol    0.083% 2.5 milliGRAM(s) Nebulizer every 6 hours  aspirin enteric coated 81 milliGRAM(s) Oral daily  atorvastatin 10 milliGRAM(s) Oral at bedtime  azithromycin  IVPB 500 milliGRAM(s) IV Intermittent every 24 hours  benzonatate 100 milliGRAM(s) Oral three times a day  budesonide 160 MICROgram(s)/formoterol 4.5 MICROgram(s) Inhaler 2 Puff(s) Inhalation two times a day  cefTRIAXone Injectable. 1000 milliGRAM(s) IV Push every 24 hours  enoxaparin Injectable 40 milliGRAM(s) SubCutaneous every 24 hours    MEDICATIONS  (PRN):  acetaminophen     Tablet .. 650 milliGRAM(s) Oral every 6 hours PRN Mild Pain (1 - 3)  aluminum hydroxide/magnesium hydroxide/simethicone Suspension 30 milliLiter(s) Oral every 4 hours PRN Dyspepsia  guaiFENesin Oral Liquid (Sugar-Free) 200 milliGRAM(s) Oral every 6 hours PRN Cough  melatonin 3 milliGRAM(s) Oral at bedtime PRN Insomnia  ondansetron Injectable 4 milliGRAM(s) IV Push every 8 hours PRN Nausea and/or Vomiting      FAMILY HISTORY:  FH: bladder cancer  father    FHx: uterine cancer  sister        SOCIAL HISTORY:  ***    REVIEW OF SYSTEM:  Pertinent items are noted in HPI.  Constitutional negative for chills, fevers, sweats and weight loss  throat, and face:  negative for epistaxis, nasal congestion, sore throat and   tinnitus  Respiratory: pos for cough, dyspnea on exertion,no pleuritic chest pain  and wheezing  Cardiovascular:  negative for chest pain, pos dyspnea and palpitations  Gastrointestinal: negative for abdominal pain, diarrhea, nausea and vomiting  Genitourinary: negative for dysuria, frequency and urinary incontinence  Skin:  negative for redness, rash, pruritus, swelling, dryness and   fissures  Hematologic/lymphatic: negative for bleeding and easy bruising  Musculoskeletal: negative for arthralgias, back pain and muscle weakness  Neurological: negative for dizziness, headaches, seizures and tremors  Behavioral/Psych:  negative for mood change, depression, suicidal attempts    Allergic/Immunologic: negative for anaphylaxis, angioedema and urticaria    Vital Signs Last 24 Hrs  T(C): 36.9 (2023 00:12), Max: 36.9 (2023 15:00)  T(F): 98.4 (2023 00:12), Max: 98.4 (2023 15:00)  HR: 70 (2023 01:11) (70 - 86)  BP: 118/59 (2023 00:12) (100/51 - 120/62)  BP(mean): --  RR: 18 (2023 00:12) (18 - 20)  SpO2: 98% (2023 01:11) (94% - 98%)    Parameters below as of 2023 01:11  Patient On (Oxygen Delivery Method): nasal cannula, 3 L            PHYSICAL EXAM  General Appearance: cooperative, no acute distress, non toxic or ill appearing  HEENT: PERRL, conjunctiva clear, EOM's intact, non injected pharynx, no exudate, TM   normal  Neck: Supple, , no adenopathy, thyroid: not enlarged, no carotid bruit or JVD  Back: Symmetric, no  tenderness,no soft tissue tenderness  Lungs: velcro type crackles and L>R rhonchi on exam  Heart: Regular rate and rhythm, S1, S2 normal, no murmur, rub or gallop  Abdomen: Soft, non-tender, bowel sounds active , no hepatosplenomegaly  Extremities: no cyanosis or edema, no joint swelling  Skin: Skin color, texture normal, no rashes   Neurologic: Alert and oriented X3 , cranial nerves intact, sensory and motor normal,    ECG:    LABS:                          11.2   8.72  )-----------( 111      ( 2023 06:51 )             32.8         134<L>  |  104  |  27<H>  ----------------------------<  141<H>  3.5   |  21<L>  |  1.12    Ca    8.2<L>      2023 11:30    TPro  6.8  /  Alb  3.1<L>  /  TBili  1.2  /  DBili  x   /  AST  28  /  ALT  27  /  AlkPhos  82              PT/INR - ( 2023 11:30 )   PT: 16.1 sec;   INR: 1.38 ratio         PTT - ( 2023 11:30 )  PTT:32.1 sec  Urinalysis Basic - ( 2023 14:27 )    Color: Yellow / Appearance: Slightly Turbid / S.015 / pH: x  Gluc: x / Ketone: Trace  / Bili: Negative / Urobili: Negative   Blood: x / Protein: 30 mg/dL / Nitrite: Negative   Leuk Esterase: Negative / RBC: 3-5 /HPF / WBC 0-2 /HPF   Sq Epi: x / Non Sq Epi: x / Bacteria: Moderate      < from: CT Chest No Cont (23 @ 20:08) >  IMPRESSION:  Left upper lobe consolidation, suspicious for pneumonia in the   appropriate clinical setting.    Bilateral upper lobe groundglass opacities, which could represent   superimposed pulmonary edema or infection.    Recommend follow-up to resolution.    < end of copied text >  < from: Xray Chest 1 View- PORTABLE-Urgent (23 @ 12:01) >  PROCEDURE DATE:  2023          INTERPRETATION:  AP chest on 2023 11:54 AM. Patient has sepsis.    Elevated right hemidiaphragm again noted.    Present film shows some linear atelectasis off the right hilum and a   sizable left perihilar infiltrate new since 2009.    IMPRESSION: Fairly large left perihilar infiltrate and mild atelectasis   off right hilum presently seen.    < end of copied text >        RADIOLOGY & ADDITIONAL STUDIES:

## 2023-07-28 NOTE — DISCHARGE NOTE NURSING/CASE MANAGEMENT/SOCIAL WORK - PATIENT PORTAL LINK FT
You can access the FollowMyHealth Patient Portal offered by [facility] by registering at the following website: [email or web address]. By joining [facility]’s FollowMyHealth portal, you will also be able to view your health information using other applications (apps) compatible with our system.

## 2023-07-28 NOTE — DISCHARGE NOTE PROVIDER - NSDCMRMEDTOKEN_GEN_ALL_CORE_FT
albuterol 2.5 mg/3 mL (0.083%) inhalation solution: 3 milliliter(s) by nebulizer once a day ***1st***  aspirin 81 mg oral delayed release tablet: 1 tab(s) orally once a day  atorvastatin 10 mg oral tablet: 1 tab(s) orally once a day  budesonide 0.5 mg/2 mL inhalation suspension: 2 milliliter(s) by nebulizer once a day  cefuroxime 500 mg oral tablet: 1 tab(s) orally 2 times a day  Fish Oil 1000 mg oral capsule: 1 cap(s) orally once a day  guaiFENesin 100 mg/5 mL oral liquid: 10 milliliter(s) orally every 6 hours As needed Cough  predniSONE 10 mg oral tablet: 1 tab(s) orally once a day 40mg  once daily x3 days  30mg once [id number]   20mg dialy x2 days  10mg daily x1 day

## 2023-07-28 NOTE — DISCHARGE NOTE NURSING/CASE MANAGEMENT/SOCIAL WORK - NSDCPEFALRISK_GEN_ALL_CORE
For information on Fall & Injury Prevention, visit: [email or web address] OR  [email or web address] OR  https://www.cdc.gov/steadi/patient.html

## 2023-07-28 NOTE — PROGRESS NOTE ADULT - PROVIDER SPECIALTY LIST ADULT
Infectious Disease
Hospitalist
Hospitalist
Infectious Disease
Pulmonology
Pulmonology
Hospitalist
Infectious Disease
Pulmonology
[FreeTextEntry1] : Office revisit on 12/4/19.\par \par Presents for followup with complaint of decreased appetite.\par \par Previously evaluated for office consultation on 2/14/18.\par \par INITIAL CONSULTATION NOTE:\par \par The patient is a 70-year-old man who presents for consultation regarding lower abdominal pain and in preparation for a surveillance colonoscopy. PMD: Dr. Richard Mendoza.\par \par The patient complains of an intermittent lower abdominal pain of 4 months duration. This pain is a lower abdominal nonradiating sharp pain that is exclusively associated with moving his bowels. Patient indicates onset of this pain wall while moving his bowels and that it lingers for 5-10 minutes after finishing the bowel movement. He denies any complaints of abdominal pain during the time interval between bowel movements. This pain is of moderate severity rated at 5-6/10 and described as sharp.\par \par The patient has long-standing history of mild chronic constipation. He typically has a bowel movement every 2-3 days he can have passage of hard stools and screening. However, he denies any change in bowel habit, rectal bleeding or diarrhea.\par \par If the patient initiates a high-fiber diet with itemS such as Jackfruit or tapioca he will have more regular bowel movements every one to 2 days and the severity of the discomfort is significantly decreased although not abolished. Discomfort in association with more regular bowel habits is rated at 1/10.\par \par Patient's appetite and weight are stable. He denies any complaints of dysphagia, heartburn, regurgitation, nausea, vomiting, gaseous discomfort or abdominal distention.\par \par The patient underwent a colonoscopy on 8/11/11 at which time a 5 mm proximal ascending colon adenoma, an 8 mm mid ascending colon adenoma and a 3 mm nonneoplastic hepatic flexure polyp were removed.\par \par The patient underwent a surveillance colonoscopy on 9/22/14. A total colonoscopy was performed to the cecum. A 6 mm transverse colon hyperplastic polyp was removed and a 4 mm proximal descending colon adenoma was removed. The colon was tortuous and redundant especially in the region of the descending colon and splenic flexure. Hemorrhoids were detected. The colonoscopy examination was otherwise normal\par \par The patient underwent a cardiac catheterization in 2013 which did not reveal any significant pathology. This report is from the patient.\par \par CURRENT HISTORY:\par \par COLONOSCOPY 3/19/18:     -Surveillance colonoscopy was performed on 3/19/18. Patient previously had removal of an adenoma. Total colonoscopy was performed to the cecum. A 3 mm mid ascending colon nonneoplastic polyp was removed. The colon was noted to be markedly redundant and tortuous. The colonoscopy examination was otherwise normal. A followup surveillance colonoscopy is advised in 5 years. \par \par ORV 12/4/19:     -Patient presents for followup with complaint of decreased appetite of one year duration. Also reports decreased taste sensation. History per patient and wife. Apparently diagnosed with normal pressure hydrocephalus prompting  shunt insertion 7/2018.  For the past year patient reports decreased appetite. Describes "no appetite and I don't want to eat". Of note, wife reports over the past few weeks or so he has improved. A 10 pound weight decrease in his reported. However, current weight is only 2 pounds less than when he was assessed on 2/14/18 when his weight was 148 pounds. \par                       -Of note, patient has long-standing issue of decreased smell sensation.\par                     -Denies any complaints of oral ulcers. No mouth burning symptoms. Denies complaints of dysphagia, heartburn, nausea, vomiting or abdominal pain. He has formed bowel movements daily without any complaint of change in bowel habit, diarrhea or rectal bleeding. Occasional constipation reported. However, this is improved on current regimen of Colace.\par                     -Patient is followed by neurology for facial tremors. Treated with Sinemet.\par                      -Patient has a history of depression and chronic anxiety.\par                      -Patient reports that PMD recently submitted routine labs as well as CT scan examination of the chest, abdomen and pelvis. Patient is awaiting results.

## 2023-07-28 NOTE — PROGRESS NOTE ADULT - REASON FOR ADMISSION
Cough, worsening SOB

## 2023-07-28 NOTE — DISCHARGE NOTE PROVIDER - CARE PROVIDER_API CALL
Bharathi Mckenzie  Pulmonary Disease  [address] [place] [address]  Phone: [phone]  Fax: [phone]  Follow Up Time:

## 2023-07-28 NOTE — PROGRESS NOTE ADULT - ASSESSMENT
87yo/M with PMH ILD/ chronic resp failure on home O2, hyperlipidemia presented with worsening SOB and productive cough for about a week. Patient states he usually uses oxygen as needed, but last few days was getting more SOB and was using oxygen more. He admit to having cough productive of yellowish sputum. No hemoptysis. Never been an active smoker, but has been passive smoker for many years. No fever/ chills (24 Jul 2023 16:09)    Assessment / plan:  Chronic hypoxemic resp failure on Home O2 therapy  ILD / pulm fibrosis  superimpose TJ consolidation  can not r/o  / BOOP given clinical presentation  Adequate oxygenation and hemodynamically stable  Hx DVT in the past    agree with antibiotics  if not improved may need course of parenteral steroids  follow up cultures and xrays  check ESR and monitor labs  ct scan reviewed with pt today and his questions are answered  send sputum for cx  Dr Vance is covering 7/27    
89yo/M with PMH ILD/ chronic resp failure on home O2, hyperlipidemia presented with worsening SOB and productive cough for about a week. Patient states he usually uses oxygen as needed, but last few days was getting more SOB and was using oxygen more. He admit to having cough productive of yellowish sputum. No hemoptysis. Never been an active smoker, but has been passive smoker for many years. No fever/ chills. Left upper lobe consolidation, suspicious for pneumonia in the appropriate clinical setting. Bilateral upper lobe groundglass opacities, which could represent superimposed pulmonary edema or infection. Eval by pulmonary, given IV rocephin/azithromycin.     1. TJ pneumonia. Chronic respiratory failure. ILD/Pulmonary fibrosis  - imaging reviewed  - pulmonary eval noted  - blood cx no growth, check sputum cx  - on IV rocephin/azithromycin #3  - continue with abx coverage  - monitor temps  - tolerating abx well so far; no side effects noted  - reason for abx use and side effects reviewed with patient  - on dc po ceftin x 7-10 day course 3 days total azithromycin  - supportive care  - fu cbc    2. other issues - per medicine   
#LT lung pneumonia vs mass  #ILD/ chronic resp failure on home O2  Admit to med-surg  Pancultured- pending   CXR reviewed- large infiltrate LT mid lung  CT Left upper lobe consolidation, suspicious for pneumonia in the appropriate clinical setting.Bilateral upper lobe groundglass opacities, which could represent superimposed pulmonary edema or infection.  Pulm eval appreciated with DR Mckenzie  Started on IV Rocephin/ Zithromax- dc on oral antibiotics when cleared   start IV solumedrol- taper as tolerated - changed to q12 hours   Nebulizer, inhalers  O2     #Hyperlipidemia- cont statin    #DVT proph- Lovenox    #Advanced directives- FULL code     Case d/w team on IDR. POssible dc in AM. 
89yo/M with PMH ILD/ chronic resp failure on home O2, hyperlipidemia presented with worsening SOB and productive cough for about a week. Patient states he usually uses oxygen as needed, but last few days was getting more SOB and was using oxygen more. He admit to having cough productive of yellowish sputum. No hemoptysis. Never been an active smoker, but has been passive smoker for many years. No fever/ chills (24 Jul 2023 16:09)    Assessment / plan:  Chronic hypoxemic resp failure on Home O2 therapy  ILD / pulm fibrosis  superimpose TJ consolidation  can not r/o  / BOOP given clinical presentation  Adequate oxygenation and hemodynamically stable  Hx DVT in the past    agree with antibiotics  if not improved may need course of parenteral steroids  follow up cultures and xrays  check ESR and monitor labs  ct scan reviewed with pt today and his questions are answered  Patient feeling better today  Receiving IV Solumedrol 40 q 8  States that he would like to be dc 7/28   will need fu with Dr ENCINAS next week  cxr today before dicharge for fu reqeusted  cont oral antibiotics and oral steroids upon discharge as taper pred 40 mg and taper off    
89yo/M with PMH ILD/ chronic resp failure on home O2, hyperlipidemia presented with worsening SOB and productive cough for about a week. Patient states he usually uses oxygen as needed, but last few days was getting more SOB and was using oxygen more. He admit to having cough productive of yellowish sputum. No hemoptysis. Never been an active smoker, but has been passive smoker for many years. No fever/ chills. Left upper lobe consolidation, suspicious for pneumonia in the appropriate clinical setting. Bilateral upper lobe groundglass opacities, which could represent superimposed pulmonary edema or infection. Eval by pulmonary, given IV rocephin/azithromycin.     1. TJ pneumonia. Chronic respiratory failure. ILD/Pulmonary fibrosis  - imaging reviewed  - pulmonary eval noted  - blood cx no growth, check sputum cx  - on IV rocephin 1gm daily #4  - s/p azithromycin #3 - dc  - continue with abx coverage  - on IV steroids   - monitor temps  - tolerating abx well so far; no side effects noted  - reason for abx use and side effects reviewed with patient  - on dc po ceftin x 7-10 day course 3 days total azithromycin  - supportive care  - fu cbc    2. other issues - per medicine   
#LT lung pneumonia vs mass  #ILD/ chronic resp failure on home O2  Admit to med-surg  Pancultured- pending   CXR reviewed- large infiltrate LT mid lung  CT Left upper lobe consolidation, suspicious for pneumonia in the appropriate clinical setting.Bilateral upper lobe groundglass opacities, which could represent superimposed pulmonary edema or infection.  Pulm eval appreciated with DR Mckenzie  Started on IV Rocephin/ Zithromax  start IV solumedrol- taper as tolerated   Nebulizer, inhalers  O2     #Hyperlipidemia- cont statin    #DVT proph- Lovenox    #Advanced directives- FULL code     Case d/w team on IDR. 
89yo/M with PMH ILD/ chronic resp failure on home O2, hyperlipidemia presented with worsening SOB and productive cough for about a week. Patient states he usually uses oxygen as needed, but last few days was getting more SOB and was using oxygen more. He admit to having cough productive of yellowish sputum. No hemoptysis. Never been an active smoker, but has been passive smoker for many years. No fever/ chills (24 Jul 2023 16:09)    Assessment / plan:  Chronic hypoxemic resp failure on Home O2 therapy  ILD / pulm fibrosis  superimpose TJ consolidation  can not r/o  / BOOP given clinical presentation  Adequate oxygenation and hemodynamically stable  Hx DVT in the past    agree with antibiotics  if not improved may need course of parenteral steroids  follow up cultures and xrays  check ESR and monitor labs  ct scan reviewed with pt today and his questions are answered  Patient feeling better today  Receiving IV Solumedrol 40 q 8  States that he would like to be dc 7/28     
89yo/M with PMH ILD/ chronic resp failure on home O2, hyperlipidemia presented with worsening SOB and productive cough for about a week. Patient states he usually uses oxygen as needed, but last few days was getting more SOB and was using oxygen more. He admit to having cough productive of yellowish sputum. No hemoptysis. Never been an active smoker, but has been passive smoker for many years. No fever/ chills. Left upper lobe consolidation, suspicious for pneumonia in the appropriate clinical setting. Bilateral upper lobe groundglass opacities, which could represent superimposed pulmonary edema or infection. Eval by pulmonary, given IV rocephin/azithromycin.     1. TJ pneumonia. Chronic respiratory failure. ILD/Pulmonary fibrosis  - imaging reviewed  - pulmonary eval noted  - blood cx no growth  - on IV rocephin 1gm daily #5  - s/p azithromycin #3   - continue with abx coverage  - on steroids   - monitor temps  - tolerating abx well so far; no side effects noted  - reason for abx use and side effects reviewed with patient  - on dc po ceftin x 7-10 day course 3 days total azithromycin  - supportive care  - fu cbc    2. other issues - per medicine   
#LT lung pneumonia vs mass  #ILD/ chronic resp failure on home O2  Admit to med-surg  Pancultured  CXR reviewed- large infiltrate LT mid lung  CT Left upper lobe consolidation, suspicious for pneumonia in the appropriate clinical setting.Bilateral upper lobe groundglass opacities, which could represent   superimposed pulmonary edema or infection.  Pulm eval appreciated with DR Mckenzie  Started on IV Rocephin/ Zithromax  start IV solumedrol  Nebulizer, inhalers  O2     #Hyperlipidemia- cont statin    #DVT proph- Lovenox    #Advanced directives- FULL code

## 2023-07-28 NOTE — DISCHARGE NOTE PROVIDER - PROVIDER RX CONTACT NUMBER
Please continue an oral antibiotic we have prescribed, ciprofloxacin, with the last day being 2/23/2018 for your urinary tract infection and bacteremia. Your infection is being appropriately treated with this antibiotic. You take the antibiotic once daily.     We have also started you on a medication called mirtazapine which both helps with appetite and aids in sleep. Take one tablet nightly.     Both medications are sent to the pharmacy on your after-visit-summary.     Home health should be ordered who will visit for physical therapy, wound care, and provide an aide for intermittent nursing care.     Continue home medications on discharge including metformin twice daily for your diabetes.   
[phone]

## 2023-07-28 NOTE — DISCHARGE NOTE PROVIDER - NSDCCAREPROVSEEN_GEN_ALL_CORE_FT
Tung, Amarilys Vance, Aftab Boo, Jackie Hernandez, Macario Mckenzie, Bharathi Burr, Jewels Broderick, Opal FELIZ

## 2023-07-28 NOTE — DISCHARGE NOTE PROVIDER - ATTENDING DISCHARGE PHYSICAL EXAMINATION:
Patient seen and examined with SHEYLA Ruby.  I was physically present for the key portions of the evaluation and management (E/M) service provided.  I agree with the above history, physical, and plan which I have reviewed and edited where appropriate.  - improving lung exam  - dc on po ceftin and prednisone and nebs  - outpt f/u with dr burnham.  cxr from today shows improvement

## 2023-07-28 NOTE — DISCHARGE NOTE PROVIDER - NSDCCPCAREPLAN_GEN_ALL_CORE_FT
PRINCIPAL DISCHARGE DIAGNOSIS  Diagnosis: Acute pneumonia  Assessment and Plan of Treatment: complete antibiotic regimen as prescribed. Complete the full course of antibiotics- do not skip or miss doses- do not stop even when you start to feel better.   Notify your PCP if your symptom has worsened or if you develop excessive diarrhea while on antibiotic.   you are also discahrged on oral prednisone taper  c/w your inhalers and oxygen  call your pulmonologist to make an appoitentm

## 2023-08-04 NOTE — ASU DISCHARGE PLAN (ADULT/PEDIATRIC) - DRIVING
Patient had high volume urine output (5L) with increased serum Na 7/28 - 7/30.   -Monitor I/O's, serum Na  -Alamo catheter  -HCTZ 12.5mg given 7/29  -Chlorothiazide 250mg given 7/29, 7/30 Patient had high volume urine output (5L) with increased serum Na 7/28 - 7/30.   -Monitor I/O's, serum Na  -Alamo catheter  -HCTZ 12.5mg given 7/29  -Chlorothiazide 250mg given 7/29, 7/30 Patient had high volume urine output (5L) with increased serum Na 7/28 - 7/30.   -Monitor I/O's, serum Na  -Alamo catheter  -HCTZ 12.5mg given 7/29; nephrology asked to start patient on HCTZ 12.5 qd (8/3)   -Chlorothiazide 250mg given 7/29, 7/30 Patient had high volume urine output (5L) with increased serum Na 7/28 - 7/30.   -Monitor I/O's, serum Na  -Alamo catheter  -HCTZ 12.5mg given 7/29; nephrology asked to start patient on HCTZ 12.5 qd (8/3)   -Chlorothiazide 250mg given 7/29, 7/30 No...

## 2023-09-20 NOTE — ASU PATIENT PROFILE, ADULT - TEACHING/LEARNING FACTORS INFLUENCE READINESS TO LEARN
Preliminary EEG report:    First 30 min of EEG was reviewed.   Background consists of diffuse theta delta slowing. No epileptiform discharges were present.   The patient was noted to have frequent occurrence of tremors in his upper extremities.  He is also noted to be tremulous and fidgety and is hyperventilating.  These are seen during almost 80% of the recording.  No epileptiform discharges or electrographic seizures are seen during these events.  Findings are consistent with moderate diffuse nonspecific encephalopathy.  Tremors did not have an abnormal EEG correlate, suggesting these are not seizures.      Debra Gonzalez MD           none

## 2023-09-23 NOTE — ED ADULT NURSE NOTE - NSICDXPASTMEDICALHX_GEN_ALL_CORE_FT
PAST MEDICAL HISTORY:  Arthritis     Deep vein thrombosis (DVT) Right leg 2016 ? unknown cause    [place] (hard of hearing) uses hearing aid    Left carpal tunnel syndrome     Squamous cell carcinoma

## 2023-09-23 NOTE — ED ADULT TRIAGE NOTE - CHIEF COMPLAINT QUOTE
BIBEMS for CP and SOB since this morning. hx COPD, asthma. pt wears 2L NC at baseline, increased oxygen to 3L today for additional support. EKG obtained by EMS unremarkable for AMI. pain is reproducible to left side of chest as per EMS. pt reports the pain radiates to the left shoulder. denies jaw pain, nausea, dizziness. pt brought into ED room to receive STAT EKG. at this time, pt states the pain is "not as bad" but the chest discomfort remains.

## 2023-09-23 NOTE — H&P ADULT - ASSESSMENT
88M w/PMH ILD/COPD/asthma/chronic resp failure on 2LNC continuous at home (lifetime nonsmoker), HLD, presents c/o left sided chest pain  In ED, trop 390, bnp 3k, Cardio, Dr Ren called per ED and recommended to start hep gtt  CTA  chest No pulmonary embolus is noted.  Marked decrease in the left upper lobe consolidation when compared to previous exam.       #NSTEMI  #chest pain  #Dyspnea at rest- 2/2 NSTEMI, possibly CHF (as evidenced by small Left pl eff on CTA, bnp 3000, and LE edema), possibly some component of COPD exac, ILD exacerbation  #CHF- unknown EF, no prior hx, new onset, likely 2/2 NSTEMI  #Chronic hypoxic resp failure 2/2 ILD, copd, asthma  - admit to tele  - asa 81mg daily po (tomorrow, already received today)  - lipitor 80mg daily po  - metoprolol 12.5 mg po bid w/ hold parameter  - TTE  - serial trops  - cardio cs  - hep gtt  - daily wt, strict i/o's, restrict fluid/sodium, IV lasix x1 now - defer further per cardio recs  - pulm cs  - monitor O2 sats, titrate to keep >92%, I increased to 3LNC for symptom comfort  - c/w home dose of albuterol, budesonide nebs daily     #PPX- on hep gtt

## 2023-09-23 NOTE — PATIENT PROFILE ADULT - FALL HARM RISK - HARM RISK INTERVENTIONS
Communicate Risk of Fall with Harm to all staff/Reinforce activity limits and safety measures with patient and family/Tailored Fall Risk Interventions/Visual Cue: Yellow wristband and red socks/Bed in lowest position, wheels locked, appropriate side rails in place/Call bell, personal items and telephone in reach/Instruct patient to call for assistance before getting out of bed or chair/Non-slip footwear when patient is out of bed/[place] to call system/Physically safe environment - no spills, clutter or unnecessary equipment/Purposeful Proactive Rounding/Room/bathroom lighting operational, light cord in reach Assistance with ambulation/Assistance OOB with selected safe patient handling equipment/Communicate Risk of Fall with Harm to all staff/Discuss with provider need for PT consult/Monitor gait and stability/Provide patient with walking aids - walker, cane, crutches/Reinforce activity limits and safety measures with patient and family/Tailored Fall Risk Interventions/Visual Cue: Yellow wristband and red socks/Bed in lowest position, wheels locked, appropriate side rails in place/Call bell, personal items and telephone in reach/Instruct patient to call for assistance before getting out of bed or chair/Non-slip footwear when patient is out of bed/[place] to call system/Physically safe environment - no spills, clutter or unnecessary equipment/Purposeful Proactive Rounding/Room/bathroom lighting operational, light cord in reach

## 2023-09-23 NOTE — ED ADULT NURSE NOTE - OBJECTIVE STATEMENT
Patient presents to ED for SOB and chest pain this morning. Pt currently denies chest pain but feels SOB. Pt states that he wears 2L O2 as needed, states "I wear oxygen when I am doing work". Denies fever/chills.

## 2023-09-23 NOTE — ED ADULT NURSE NOTE - NSFALLHARMRISKINTERV_ED_ALL_ED

## 2023-09-23 NOTE — PATIENT PROFILE ADULT - NSPROGENBLOODRESTRICT_GEN_A_NUR
Mohs Case Number:  Biopsy Photograph Reviewed: Yes Consent Type: Consent 1 (Standard) 34 m w vomiting blood- started last night- vomited bloody emesis x 1- also drank 1/2 pint of whiskey last night- drinks heavily when he does drink/few times per week- no ho etoh w/d no meds/no pmh  vomited about 6 times today- bloody/coffee ground emesis  no f/c  no dizziness/lightheadedness Eye Shield Used: No Initial Size Of Lesion: 1.2 Number Of Stages: 1 Primary Defect Length In Cm (Final Defect Size - Required For Flaps/Grafts): 1.7 Primary Defect Width In Cm (Final Defect Size - Required For Flaps/Grafts): 1.5 Repair Type: Complex Repair Oculoplastic Surgeon Procedure Text (A): After obtaining clear surgical margins the patient was sent to oculoplastics for surgical repair.  The patient understands they will receive post-surgical care and follow-up from the referring physician's office. Otolaryngologist Procedure Text (A): After obtaining clear surgical margins the patient was sent to otolaryngology for surgical repair.  The patient understands they will receive post-surgical care and follow-up from the referring physician's office. Plastic Surgeon Procedure Text (A): After obtaining clear surgical margins the patient was sent to plastics for surgical repair.  The patient understands they will receive post-surgical care and follow-up from the referring physician's office. Mid-Level Procedure Text (A): After obtaining clear surgical margins the patient was sent to a mid-level provider for surgical repair.  The patient understands they will receive post-surgical care and follow-up from the mid-level provider. Provider Procedure Text (A): After obtaining clear surgical margins the defect was repaired by another provider. Asc Procedure Text (A): After obtaining clear surgical margins the patient was sent to an ASC for surgical repair.  The patient understands they will receive post-surgical care and follow-up from the ASC physician. Suturegard Retention Suture: 2-0 Nylon Retention Suture Bite Size: 3 mm Length To Time In Minutes Device Was In Place: 10 Simple / Intermediate / Complex Repair - Final Wound Length In Cm: 3.2 Undermining Type: Entire Wound Complex Requirements: Exposure Of Vital Structure?: Bone Debridement Text: The wound edges were debrided prior to proceeding with the closure to facilitate wound healing. Helical Rim Text: The closure involved the helical rim. Vermilion Border Text: The closure involved the vermilion border. Nostril Rim Text: The closure involved the nostril rim. Retention Suture Text: Retention sutures were placed to support the closure and prevent dehiscence. Secondary Defect Length In Cm (Required For Flaps): 0 Location Indication Override (Is Already Calculated Based On Selected Body Location): Area M Area H Indication Text: Tumors in this location are included in Area H (eyelids, eyebrows, nose, lips, chin, ear, pre-auricular, post-auricular, temple, genitalia, hands, feet, ankles and areola).  Tissue conservation is critical in these anatomic locations. Area M Indication Text: Tumors in this location are included in Area M (cheek, forehead, scalp, neck, jawline and pretibial skin).  Mohs surgery is indicated for tumors in these anatomic locations. Area L Indication Text: Tumors in this location are included in Area L (trunk and extremities).  Mohs surgery is indicated for larger tumors, or tumors with aggressive histologic features, in these anatomic locations. Tumor Debulked?: curette Stage 1: Number Of Blocks?: 2 Stage 2: Additional Anesthesia Type: 1% lidocaine, 0.5% Marcaine, 1:100,000 epinephrine and 8.4% sodium bicarbonate Medical Necessity Statement: Based on my medical judgement, Mohs surgery is the most appropriate treatment for this cancer compared to other treatments. Alternatives Discussed Intro (Do Not Add Period): I discussed alternative treatments to Mohs surgery and specifically discussed the risks and benefits of Consent 1/Introductory Paragraph: The rationale for Mohs was explained to the patient and consent was obtained. The risks, benefits and alternatives to therapy were discussed in detail. Specifically, the risks of infection, scarring, bleeding, prolonged wound healing, incomplete removal, allergy to anesthesia, nerve injury and recurrence were addressed. Prior to the procedure, the treatment site was clearly identified and confirmed by the patient. All components of Universal Protocol/PAUSE Rule completed. Consent 2/Introductory Paragraph: Mohs surgery was explained to the patient and consent was obtained. The risks, benefits and alternatives to therapy were discussed in detail. Specifically, the risks of infection, scarring, bleeding, prolonged wound healing, incomplete removal, allergy to anesthesia, nerve injury and recurrence were addressed. Prior to the procedure, the treatment site was clearly identified and confirmed by the patient. All components of Universal Protocol/PAUSE Rule completed. Consent 3/Introductory Paragraph: I gave the patient a chance to ask questions they had about the procedure.  Following this I explained the Mohs procedure and consent was obtained. The risks, benefits and alternatives to therapy were discussed in detail. Specifically, the risks of infection, scarring, bleeding, prolonged wound healing, incomplete removal, allergy to anesthesia, nerve injury and recurrence were addressed. Prior to the procedure, the treatment site was clearly identified and confirmed by the patient. All components of Universal Protocol/PAUSE Rule completed. Consent (Temporal Branch)/Introductory Paragraph: The rationale for Mohs was explained to the patient and consent was obtained. The risks, benefits and alternatives to therapy were discussed in detail. Specifically, the risks of damage to the temporal branch of the facial nerve, infection, scarring, bleeding, prolonged wound healing, incomplete removal, allergy to anesthesia, and recurrence were addressed. Prior to the procedure, the treatment site was clearly identified and confirmed by the patient. All components of Universal Protocol/PAUSE Rule completed. Consent (Marginal Mandibular)/Introductory Paragraph: The rationale for Mohs was explained to the patient and consent was obtained. The risks, benefits and alternatives to therapy were discussed in detail. Specifically, the risks of damage to the marginal mandibular branch of the facial nerve, infection, scarring, bleeding, prolonged wound healing, incomplete removal, allergy to anesthesia, and recurrence were addressed. Prior to the procedure, the treatment site was clearly identified and confirmed by the patient. All components of Universal Protocol/PAUSE Rule completed. Consent (Spinal Accessory)/Introductory Paragraph: The rationale for Mohs was explained to the patient and consent was obtained. The risks, benefits and alternatives to therapy were discussed in detail. Specifically, the risks of damage to the spinal accessory nerve, infection, scarring, bleeding, prolonged wound healing, incomplete removal, allergy to anesthesia, and recurrence were addressed. Prior to the procedure, the treatment site was clearly identified and confirmed by the patient. All components of Universal Protocol/PAUSE Rule completed. Consent (Near Eyelid Margin)/Introductory Paragraph: The rationale for Mohs was explained to the patient and consent was obtained. The risks, benefits and alternatives to therapy were discussed in detail. Specifically, the risks of ectropion or eyelid deformity, infection, scarring, bleeding, prolonged wound healing, incomplete removal, allergy to anesthesia, nerve injury and recurrence were addressed. Prior to the procedure, the treatment site was clearly identified and confirmed by the patient. All components of Universal Protocol/PAUSE Rule completed. Consent (Ear)/Introductory Paragraph: The rationale for Mohs was explained to the patient and consent was obtained. The risks, benefits and alternatives to therapy were discussed in detail. Specifically, the risks of ear deformity, infection, scarring, bleeding, prolonged wound healing, incomplete removal, allergy to anesthesia, nerve injury and recurrence were addressed. Prior to the procedure, the treatment site was clearly identified and confirmed by the patient. All components of Universal Protocol/PAUSE Rule completed. Consent (Nose)/Introductory Paragraph: The rationale for Mohs was explained to the patient and consent was obtained. The risks, benefits and alternatives to therapy were discussed in detail. Specifically, the risks of nasal deformity, changes in the flow of air through the nose, infection, scarring, bleeding, prolonged wound healing, incomplete removal, allergy to anesthesia, nerve injury and recurrence were addressed. Prior to the procedure, the treatment site was clearly identified and confirmed by the patient. All components of Universal Protocol/PAUSE Rule completed. Consent (Lip)/Introductory Paragraph: The rationale for Mohs was explained to the patient and consent was obtained. The risks, benefits and alternatives to therapy were discussed in detail. Specifically, the risks of lip deformity, changes in the oral aperture, infection, scarring, bleeding, prolonged wound healing, incomplete removal, allergy to anesthesia, nerve injury and recurrence were addressed. Prior to the procedure, the treatment site was clearly identified and confirmed by the patient. All components of Universal Protocol/PAUSE Rule completed. Consent (Scalp)/Introductory Paragraph: The rationale for Mohs was explained to the patient and consent was obtained. The risks, benefits and alternatives to therapy were discussed in detail. Specifically, the risks of changes in hair growth pattern secondary to repair, infection, scarring, bleeding, prolonged wound healing, incomplete removal, allergy to anesthesia, nerve injury and recurrence were addressed. Prior to the procedure, the treatment site was clearly identified and confirmed by the patient. All components of Universal Protocol/PAUSE Rule completed. Detail Level: Detailed Postop Diagnosis: same Surgeon: Darwin Fiore MD Anesthesia Volume In Cc: 3 Additional Anesthesia Volume In Cc: 6 Hemostasis: Electrodesiccation Estimated Blood Loss (Cc): minimal Stage 6: Additional Anesthesia Type: 1% lidocaine with epinephrine Repair Anesthesia Method: local infiltration Undermining Location (Optional): in the deep fat Deep Sutures: 3-0 Vicryl Epidermal Sutures: 4-0 Ethilon Epidermal Closure: running Suturegard Intro: Intraoperative tissue expansion was performed, utilizing the SUTUREGARD device, in order to reduce wound tension. Suturegard Body: The suture ends were repeatedly re-tightened and re-clamped to achieve the desired tissue expansion. Donor Site Anesthesia Type: same as repair anesthesia Graft Basting Suture (Optional): 5-0 Fast Absorbing Gut Graft Donor Site Dermal Sutures (Optional): 4-0 Vicryl Graft Donor Site Epidermal Sutures (Optional): 5-0 Ethilon Graft Donor Site Bandage (Optional-Leave Blank If You Don't Want In Note): pressure bandage were applied to the donor site. Closure 2 Information: This tab is for additional flaps and grafts, including complex repair and grafts and complex repair and flaps. You can also specify a different location for the additional defect, if the location is the same you do not need to select a new one. We will insert the automated text for the repair you select below just as we do for solitary flaps and grafts. Please note that at this time if you select a location with a different insurance zone you will need to override the ICD10 and CPT if appropriate. Closure 3 Information: This tab is for additional flaps and grafts above and beyond our usual structured repairs.  Please note if you enter information here it will not currently bill and you will need to add the billing information manually. Wound Care: Petrolatum Dressing: pressure dressing Dressing (No Sutures): dry sterile dressing Suture Removal: 14 days Unna Boot Text: An Unna boot was placed to help immobilize the limb and facilitate more rapid healing. Home Suture Removal Text: Patient was provided instructions on removing sutures and will remove their sutures at home.  If they have any questions or difficulties they will call the office. Post-Care Instructions: I reviewed with the patient in detail post-care instructions. Patient is not to engage in any heavy lifting, exercise, or swimming for the next 7 days. Should the patient develop any fevers, chills, bleeding, severe pain patient will contact the office immediately. Pain Refusal Text: I offered to prescribe pain medication but the patient refused to take this medication. Mauc Instructions: By selecting yes to the question below the MAUC number will be added into the note.  This will be calculated automatically based on the diagnosis chosen, the size entered, the body zone selected (H,M,L) and the specific indications you chose. You will also have the option to override the Mohs AUC if you disagree with the automatically calculated number and this option is found in the Case Summary tab. Where Do You Want The Question To Include Opioid Counseling Located?: Case Summary Tab none Eye Protection Verbiage: Before proceeding with the stage, a plastic scleral shield was inserted. The globe was anesthetized with a few drops of 1% lidocaine with 1:100,000 epinephrine. Then, an appropriate sized scleral shield was chosen and coated with lacrilube ointment. The shield was gently inserted and left in place for the duration of each stage. After the stage was completed, the shield was gently removed. Mohs Method Verbiage: An incision at a 45 degree angle following the standard Mohs approach was done and the specimen was harvested as a microscopic controlled layer. Surgeon/Pathologist Verbiage (Will Incorporate Name Of Surgeon From Intro If Not Blank): operated in two distinct and integrated capacities as the surgeon and pathologist. Mohs Histo Method Verbiage: Each section was then chromacoded and processed in the Mohs lab using the Mohs protocol and submitted for frozen section. Subsequent Stages Histo Method Verbiage: Using a similar technique to that described above, a thin layer of tissue was removed from all areas where tumor was visible on the previous stage.  The tissue was again oriented, mapped, dyed, and processed as above. Mohs Rapid Report Verbiage: The area of clinically evident tumor was marked with skin marking ink and appropriately hatched.  The initial incision was made following the Mohs approach through the skin.  The specimen was taken to the lab, divided into the necessary number of pieces, chromacoded and processed according to the Mohs protocol.  This was repeated in successive stages until a tumor free defect was achieved. Complex Repair Preamble Text (Leave Blank If You Do Not Want): Extensive wide undermining was performed. Intermediate Repair Preamble Text (Leave Blank If You Do Not Want): Undermining was performed with blunt dissection. Non-Graft Cartilage Fenestration Text: The cartilage was fenestrated with a 2mm punch biopsy to help facilitate healing. Graft Cartilage Fenestration Text: The cartilage was fenestrated with a 2mm punch biopsy to help facilitate graft survival and healing. Secondary Intention Text (Leave Blank If You Do Not Want): The defect will heal with secondary intention. No Repair - Repaired With Adjacent Surgical Defect Text (Leave Blank If You Do Not Want): After obtaining clear surgical margins the defect was repaired concurrently with another surgical defect which was in close approximation. Advancement Flap (Single) Text: The defect edges were debeveled with a #15 scalpel blade.  Given the location of the defect and the proximity to free margins a single advancement flap was deemed most appropriate.  Using a sterile surgical marker, an appropriate advancement flap was drawn incorporating the defect and placing the expected incisions within the relaxed skin tension lines where possible.    The area thus outlined was incised deep to adipose tissue with a #15 scalpel blade.  The skin margins were undermined to an appropriate distance in all directions utilizing iris scissors. Advancement Flap (Double) Text: The defect edges were debeveled with a #15 scalpel blade.  Given the location of the defect and the proximity to free margins a double advancement flap was deemed most appropriate.  Using a sterile surgical marker, the appropriate advancement flaps were drawn incorporating the defect and placing the expected incisions within the relaxed skin tension lines where possible.    The area thus outlined was incised deep to adipose tissue with a #15 scalpel blade.  The skin margins were undermined to an appropriate distance in all directions utilizing iris scissors. Burow's Advancement Flap Text: The defect edges were debeveled with a #15 scalpel blade.  Given the location of the defect and the proximity to free margins a Burow's advancement flap was deemed most appropriate.  Using a sterile surgical marker, the appropriate advancement flap was drawn incorporating the defect and placing the expected incisions within the relaxed skin tension lines where possible.    The area thus outlined was incised deep to adipose tissue with a #15 scalpel blade.  The skin margins were undermined to an appropriate distance in all directions utilizing iris scissors. Chonodrocutaneous Helical Advancement Flap Text: The defect edges were debeveled with a #15 scalpel blade.  Given the location of the defect and the proximity to free margins a chondrocutaneous helical advancement flap was deemed most appropriate.  Using a sterile surgical marker, the appropriate advancement flap was drawn incorporating the defect and placing the expected incisions within the relaxed skin tension lines where possible.    The area thus outlined was incised deep to adipose tissue with a #15 scalpel blade.  The skin margins were undermined to an appropriate distance in all directions utilizing iris scissors. Crescentic Advancement Flap Text: The defect edges were debeveled with a #15 scalpel blade.  Given the location of the defect and the proximity to free margins a crescentic advancement flap was deemed most appropriate.  Using a sterile surgical marker, the appropriate advancement flap was drawn incorporating the defect and placing the expected incisions within the relaxed skin tension lines where possible.    The area thus outlined was incised deep to adipose tissue with a #15 scalpel blade.  The skin margins were undermined to an appropriate distance in all directions utilizing iris scissors. A-T Advancement Flap Text: The defect edges were debeveled with a #15 scalpel blade.  Given the location of the defect, shape of the defect and the proximity to free margins an A-T advancement flap was deemed most appropriate.  Using a sterile surgical marker, an appropriate advancement flap was drawn incorporating the defect and placing the expected incisions within the relaxed skin tension lines where possible.    The area thus outlined was incised deep to adipose tissue with a #15 scalpel blade.  The skin margins were undermined to an appropriate distance in all directions utilizing iris scissors. O-T Advancement Flap Text: The defect edges were debeveled with a #15 scalpel blade.  Given the location of the defect, shape of the defect and the proximity to free margins an O-T advancement flap was deemed most appropriate.  Using a sterile surgical marker, an appropriate advancement flap was drawn incorporating the defect and placing the expected incisions within the relaxed skin tension lines where possible.    The area thus outlined was incised deep to adipose tissue with a #15 scalpel blade.  The skin margins were undermined to an appropriate distance in all directions utilizing iris scissors. O-L Flap Text: The defect edges were debeveled with a #15 scalpel blade.  Given the location of the defect, shape of the defect and the proximity to free margins an O-L flap was deemed most appropriate.  Using a sterile surgical marker, an appropriate advancement flap was drawn incorporating the defect and placing the expected incisions within the relaxed skin tension lines where possible.    The area thus outlined was incised deep to adipose tissue with a #15 scalpel blade.  The skin margins were undermined to an appropriate distance in all directions utilizing iris scissors. O-Z Flap Text: The defect edges were debeveled with a #15 scalpel blade.  Given the location of the defect, shape of the defect and the proximity to free margins an O-Z flap was deemed most appropriate.  Using a sterile surgical marker, an appropriate transposition flap was drawn incorporating the defect and placing the expected incisions within the relaxed skin tension lines where possible. The area thus outlined was incised deep to adipose tissue with a #15 scalpel blade.  The skin margins were undermined to an appropriate distance in all directions utilizing iris scissors. Double O-Z Flap Text: The defect edges were debeveled with a #15 scalpel blade.  Given the location of the defect, shape of the defect and the proximity to free margins a Double O-Z flap was deemed most appropriate.  Using a sterile surgical marker, an appropriate transposition flap was drawn incorporating the defect and placing the expected incisions within the relaxed skin tension lines where possible. The area thus outlined was incised deep to adipose tissue with a #15 scalpel blade.  The skin margins were undermined to an appropriate distance in all directions utilizing iris scissors. V-Y Flap Text: The defect edges were debeveled with a #15 scalpel blade.  Given the location of the defect, shape of the defect and the proximity to free margins a V-Y flap was deemed most appropriate.  Using a sterile surgical marker, an appropriate advancement flap was drawn incorporating the defect and placing the expected incisions within the relaxed skin tension lines where possible.    The area thus outlined was incised deep to adipose tissue with a #15 scalpel blade.  The skin margins were undermined to an appropriate distance in all directions utilizing iris scissors. Advancement-Rotation Flap Text: The defect edges were debeveled with a #15 scalpel blade.  Given the location of the defect, shape of the defect and the proximity to free margins an advancement-rotation flap was deemed most appropriate.  Using a sterile surgical marker, an appropriate flap was drawn incorporating the defect and placing the expected incisions within the relaxed skin tension lines where possible. The area thus outlined was incised deep to adipose tissue with a #15 scalpel blade.  The skin margins were undermined to an appropriate distance in all directions utilizing iris scissors. Mercedes Flap Text: The defect edges were debeveled with a #15 scalpel blade.  Given the location of the defect, shape of the defect and the proximity to free margins a Mercedes flap was deemed most appropriate.  Using a sterile surgical marker, an appropriate advancement flap was drawn incorporating the defect and placing the expected incisions within the relaxed skin tension lines where possible. The area thus outlined was incised deep to adipose tissue with a #15 scalpel blade.  The skin margins were undermined to an appropriate distance in all directions utilizing iris scissors. Modified Advancement Flap Text: The defect edges were debeveled with a #15 scalpel blade.  Given the location of the defect, shape of the defect and the proximity to free margins a modified advancement flap was deemed most appropriate.  Using a sterile surgical marker, an appropriate advancement flap was drawn incorporating the defect and placing the expected incisions within the relaxed skin tension lines where possible.    The area thus outlined was incised deep to adipose tissue with a #15 scalpel blade.  The skin margins were undermined to an appropriate distance in all directions utilizing iris scissors. Mucosal Advancement Flap Text: Given the location of the defect, shape of the defect and the proximity to free margins a mucosal advancement flap was deemed most appropriate. Incisions were made with a 15 blade scalpel in the appropriate fashion along the cutaneous vermilion border and the mucosal lip. The remaining actinically damaged mucosal tissue was excised.  The mucosal advancement flap was then elevated to the gingival sulcus with care taken to preserve the neurovascular structures and advanced into the primary defect. Care was taken to ensure that precise realignment of the vermilion border was achieved. Hatchet Flap Text: The defect edges were debeveled with a #15 scalpel blade.  Given the location of the defect, shape of the defect and the proximity to free margins a hatchet flap was deemed most appropriate.  Using a sterile surgical marker, an appropriate hatchet flap was drawn incorporating the defect and placing the expected incisions within the relaxed skin tension lines where possible.    The area thus outlined was incised deep to adipose tissue with a #15 scalpel blade.  The skin margins were undermined to an appropriate distance in all directions utilizing iris scissors. Rotation Flap Text: The defect edges were debeveled with a #15 scalpel blade.  Given the location of the defect, shape of the defect and the proximity to free margins a rotation flap was deemed most appropriate.  Using a sterile surgical marker, an appropriate rotation flap was drawn incorporating the defect and placing the expected incisions within the relaxed skin tension lines where possible.    The area thus outlined was incised deep to adipose tissue with a #15 scalpel blade.  The skin margins were undermined to an appropriate distance in all directions utilizing iris scissors. Spiral Flap Text: The defect edges were debeveled with a #15 scalpel blade.  Given the location of the defect, shape of the defect and the proximity to free margins a spiral flap was deemed most appropriate.  Using a sterile surgical marker, an appropriate rotation flap was drawn incorporating the defect and placing the expected incisions within the relaxed skin tension lines where possible. The area thus outlined was incised deep to adipose tissue with a #15 scalpel blade.  The skin margins were undermined to an appropriate distance in all directions utilizing iris scissors. Star Wedge Flap Text: The defect edges were debeveled with a #15 scalpel blade.  Given the location of the defect, shape of the defect and the proximity to free margins a star wedge flap was deemed most appropriate.  Using a sterile surgical marker, an appropriate rotation flap was drawn incorporating the defect and placing the expected incisions within the relaxed skin tension lines where possible. The area thus outlined was incised deep to adipose tissue with a #15 scalpel blade.  The skin margins were undermined to an appropriate distance in all directions utilizing iris scissors. Transposition Flap Text: The defect edges were debeveled with a #15 scalpel blade.  Given the location of the defect and the proximity to free margins a transposition flap was deemed most appropriate.  Using a sterile surgical marker, an appropriate transposition flap was drawn incorporating the defect.    The area thus outlined was incised deep to adipose tissue with a #15 scalpel blade.  The skin margins were undermined to an appropriate distance in all directions utilizing iris scissors. Muscle Hinge Flap Text: The defect edges were debeveled with a #15 scalpel blade.  Given the size, depth and location of the defect and the proximity to free margins a muscle hinge flap was deemed most appropriate.  Using a sterile surgical marker, an appropriate hinge flap was drawn incorporating the defect. The area thus outlined was incised with a #15 scalpel blade.  The skin margins were undermined to an appropriate distance in all directions utilizing iris scissors. Melolabial Transposition Flap Text: The defect edges were debeveled with a #15 scalpel blade.  Given the location of the defect and the proximity to free margins a melolabial flap was deemed most appropriate.  Using a sterile surgical marker, an appropriate melolabial transposition flap was drawn incorporating the defect.    The area thus outlined was incised deep to adipose tissue with a #15 scalpel blade.  The skin margins were undermined to an appropriate distance in all directions utilizing iris scissors. Rhombic Flap Text: The defect edges were debeveled with a #15 scalpel blade.  Given the location of the defect and the proximity to free margins a rhombic flap was deemed most appropriate.  Using a sterile surgical marker, an appropriate rhombic flap was drawn incorporating the defect.    The area thus outlined was incised deep to adipose tissue with a #15 scalpel blade.  The skin margins were undermined to an appropriate distance in all directions utilizing iris scissors. Rhomboid Transposition Flap Text: The defect edges were debeveled with a #15 scalpel blade.  Given the location of the defect and the proximity to free margins a rhomboid transposition flap was deemed most appropriate.  Using a sterile surgical marker, an appropriate rhomboid flap was drawn incorporating the defect.    The area thus outlined was incised deep to adipose tissue with a #15 scalpel blade.  The skin margins were undermined to an appropriate distance in all directions utilizing iris scissors. Bi-Rhombic Flap Text: The defect edges were debeveled with a #15 scalpel blade.  Given the location of the defect and the proximity to free margins a bi-rhombic flap was deemed most appropriate.  Using a sterile surgical marker, an appropriate rhombic flap was drawn incorporating the defect. The area thus outlined was incised deep to adipose tissue with a #15 scalpel blade.  The skin margins were undermined to an appropriate distance in all directions utilizing iris scissors. Helical Rim Advancement Flap Text: The defect edges were debeveled with a #15 blade scalpel.  Given the location of the defect and the proximity to free margins (helical rim) a double helical rim advancement flap was deemed most appropriate.  Using a sterile surgical marker, the appropriate advancement flaps were drawn incorporating the defect and placing the expected incisions between the helical rim and antihelix where possible.  The area thus outlined was incised through and through with a #15 scalpel blade.  With a skin hook and iris scissors, the flaps were gently and sharply undermined and freed up. Bilateral Helical Rim Advancement Flap Text: The defect edges were debeveled with a #15 blade scalpel.  Given the location of the defect and the proximity to free margins (helical rim) a bilateral helical rim advancement flap was deemed most appropriate.  Using a sterile surgical marker, the appropriate advancement flaps were drawn incorporating the defect and placing the expected incisions between the helical rim and antihelix where possible.  The area thus outlined was incised through and through with a #15 scalpel blade.  With a skin hook and iris scissors, the flaps were gently and sharply undermined and freed up. Ear Star Wedge Flap Text: The defect edges were debeveled with a #15 blade scalpel.  Given the location of the defect and the proximity to free margins (helical rim) an ear star wedge flap was deemed most appropriate.  Using a sterile surgical marker, the appropriate flap was drawn incorporating the defect and placing the expected incisions between the helical rim and antihelix where possible.  The area thus outlined was incised through and through with a #15 scalpel blade. Banner Transposition Flap Text: The defect edges were debeveled with a #15 scalpel blade.  Given the location of the defect and the proximity to free margins a Banner transposition flap was deemed most appropriate.  Using a sterile surgical marker, an appropriate flap drawn around the defect. The area thus outlined was incised deep to adipose tissue with a #15 scalpel blade.  The skin margins were undermined to an appropriate distance in all directions utilizing iris scissors. Bilobed Flap Text: The defect edges were debeveled with a #15 scalpel blade.  Given the location of the defect and the proximity to free margins a bilobe flap was deemed most appropriate.  Using a sterile surgical marker, an appropriate bilobe flap drawn around the defect.    The area thus outlined was incised deep to adipose tissue with a #15 scalpel blade.  The skin margins were undermined to an appropriate distance in all directions utilizing iris scissors. Bilobed Transposition Flap Text: The defect edges were debeveled with a #15 scalpel blade.  Given the location of the defect and the proximity to free margins a bilobed transposition flap was deemed most appropriate.  Using a sterile surgical marker, an appropriate bilobe flap drawn around the defect.    The area thus outlined was incised deep to adipose tissue with a #15 scalpel blade.  The skin margins were undermined to an appropriate distance in all directions utilizing iris scissors. Trilobed Flap Text: The defect edges were debeveled with a #15 scalpel blade.  Given the location of the defect and the proximity to free margins a trilobed flap was deemed most appropriate.  Using a sterile surgical marker, an appropriate trilobed flap drawn around the defect.    The area thus outlined was incised deep to adipose tissue with a #15 scalpel blade.  The skin margins were undermined to an appropriate distance in all directions utilizing iris scissors. Dorsal Nasal Flap Text: The defect edges were debeveled with a #15 scalpel blade.  Given the location of the defect and the proximity to free margins a dorsal nasal flap was deemed most appropriate.  Using a sterile surgical marker, an appropriate dorsal nasal flap was drawn around the defect.    The area thus outlined was incised deep to adipose tissue with a #15 scalpel blade.  The skin margins were undermined to an appropriate distance in all directions utilizing iris scissors. Island Pedicle Flap Text: The defect edges were debeveled with a #15 scalpel blade.  Given the location of the defect, shape of the defect and the proximity to free margins an island pedicle advancement flap was deemed most appropriate.  Using a sterile surgical marker, an appropriate advancement flap was drawn incorporating the defect, outlining the appropriate donor tissue and placing the expected incisions within the relaxed skin tension lines where possible.    The area thus outlined was incised deep to adipose tissue with a #15 scalpel blade.  The skin margins were undermined to an appropriate distance in all directions around the primary defect and laterally outward around the island pedicle utilizing iris scissors.  There was minimal undermining beneath the pedicle flap. Island Pedicle Flap With Canthal Suspension Text: The defect edges were debeveled with a #15 scalpel blade.  Given the location of the defect, shape of the defect and the proximity to free margins an island pedicle advancement flap was deemed most appropriate.  Using a sterile surgical marker, an appropriate advancement flap was drawn incorporating the defect, outlining the appropriate donor tissue and placing the expected incisions within the relaxed skin tension lines where possible. The area thus outlined was incised deep to adipose tissue with a #15 scalpel blade.  The skin margins were undermined to an appropriate distance in all directions around the primary defect and laterally outward around the island pedicle utilizing iris scissors.  There was minimal undermining beneath the pedicle flap. A suspension suture was placed in the canthal tendon to prevent tension and prevent ectropion. Alar Island Pedicle Flap Text: The defect edges were debeveled with a #15 scalpel blade.  Given the location of the defect, shape of the defect and the proximity to the alar rim an island pedicle advancement flap was deemed most appropriate.  Using a sterile surgical marker, an appropriate advancement flap was drawn incorporating the defect, outlining the appropriate donor tissue and placing the expected incisions within the nasal ala running parallel to the alar rim. The area thus outlined was incised with a #15 scalpel blade.  The skin margins were undermined minimally to an appropriate distance in all directions around the primary defect and laterally outward around the island pedicle utilizing iris scissors.  There was minimal undermining beneath the pedicle flap. Double Island Pedicle Flap Text: The defect edges were debeveled with a #15 scalpel blade.  Given the location of the defect, shape of the defect and the proximity to free margins a double island pedicle advancement flap was deemed most appropriate.  Using a sterile surgical marker, an appropriate advancement flap was drawn incorporating the defect, outlining the appropriate donor tissue and placing the expected incisions within the relaxed skin tension lines where possible.    The area thus outlined was incised deep to adipose tissue with a #15 scalpel blade.  The skin margins were undermined to an appropriate distance in all directions around the primary defect and laterally outward around the island pedicle utilizing iris scissors.  There was minimal undermining beneath the pedicle flap. Island Pedicle Flap-Requiring Vessel Identification Text: The defect edges were debeveled with a #15 scalpel blade.  Given the location of the defect, shape of the defect and the proximity to free margins an island pedicle advancement flap was deemed most appropriate.  Using a sterile surgical marker, an appropriate advancement flap was drawn, based on the axial vessel mentioned above, incorporating the defect, outlining the appropriate donor tissue and placing the expected incisions within the relaxed skin tension lines where possible.    The area thus outlined was incised deep to adipose tissue with a #15 scalpel blade.  The skin margins were undermined to an appropriate distance in all directions around the primary defect and laterally outward around the island pedicle utilizing iris scissors.  There was minimal undermining beneath the pedicle flap. Keystone Flap Text: The defect edges were debeveled with a #15 scalpel blade.  Given the location of the defect, shape of the defect a keystone flap was deemed most appropriate.  Using a sterile surgical marker, an appropriate keystone flap was drawn incorporating the defect, outlining the appropriate donor tissue and placing the expected incisions within the relaxed skin tension lines where possible. The area thus outlined was incised deep to adipose tissue with a #15 scalpel blade.  The skin margins were undermined to an appropriate distance in all directions around the primary defect and laterally outward around the flap utilizing iris scissors. O-T Plasty Text: The defect edges were debeveled with a #15 scalpel blade.  Given the location of the defect, shape of the defect and the proximity to free margins an O-T plasty was deemed most appropriate.  Using a sterile surgical marker, an appropriate O-T plasty was drawn incorporating the defect and placing the expected incisions within the relaxed skin tension lines where possible.    The area thus outlined was incised deep to adipose tissue with a #15 scalpel blade.  The skin margins were undermined to an appropriate distance in all directions utilizing iris scissors. O-Z Plasty Text: The defect edges were debeveled with a #15 scalpel blade.  Given the location of the defect, shape of the defect and the proximity to free margins an O-Z plasty (double transposition flap) was deemed most appropriate.  Using a sterile surgical marker, the appropriate transposition flaps were drawn incorporating the defect and placing the expected incisions within the relaxed skin tension lines where possible.    The area thus outlined was incised deep to adipose tissue with a #15 scalpel blade.  The skin margins were undermined to an appropriate distance in all directions utilizing iris scissors.  Hemostasis was achieved with electrocautery.  The flaps were then transposed into place, one clockwise and the other counterclockwise, and anchored with interrupted buried subcutaneous sutures. Double O-Z Plasty Text: The defect edges were debeveled with a #15 scalpel blade.  Given the location of the defect, shape of the defect and the proximity to free margins a Double O-Z plasty (double transposition flap) was deemed most appropriate.  Using a sterile surgical marker, the appropriate transposition flaps were drawn incorporating the defect and placing the expected incisions within the relaxed skin tension lines where possible. The area thus outlined was incised deep to adipose tissue with a #15 scalpel blade.  The skin margins were undermined to an appropriate distance in all directions utilizing iris scissors.  Hemostasis was achieved with electrocautery.  The flaps were then transposed into place, one clockwise and the other counterclockwise, and anchored with interrupted buried subcutaneous sutures. S Plasty Text: Given the location and shape of the defect, and the orientation of relaxed skin tension lines, an S-plasty was deemed most appropriate for repair.  Using a sterile surgical marker, the appropriate outline of the S-plasty was drawn, incorporating the defect and placing the expected incisions within the relaxed skin tension lines where possible.  The area thus outlined was incised deep to adipose tissue with a #15 scalpel blade.  The skin margins were undermined to an appropriate distance in all directions utilizing iris scissors. The skin flaps were advanced over the defect.  The opposing margins were then approximated with interrupted buried subcutaneous sutures. V-Y Plasty Text: The defect edges were debeveled with a #15 scalpel blade.  Given the location of the defect, shape of the defect and the proximity to free margins an V-Y advancement flap was deemed most appropriate.  Using a sterile surgical marker, an appropriate advancement flap was drawn incorporating the defect and placing the expected incisions within the relaxed skin tension lines where possible.    The area thus outlined was incised deep to adipose tissue with a #15 scalpel blade.  The skin margins were undermined to an appropriate distance in all directions utilizing iris scissors. H Plasty Text: Given the location of the defect, shape of the defect and the proximity to free margins a H-plasty was deemed most appropriate for repair.  Using a sterile surgical marker, the appropriate advancement arms of the H-plasty were drawn incorporating the defect and placing the expected incisions within the relaxed skin tension lines where possible. The area thus outlined was incised deep to adipose tissue with a #15 scalpel blade. The skin margins were undermined to an appropriate distance in all directions utilizing iris scissors.  The opposing advancement arms were then advanced into place in opposite direction and anchored with interrupted buried subcutaneous sutures. W Plasty Text: The lesion was extirpated to the level of the fat with a #15 scalpel blade.  Given the location of the defect, shape of the defect and the proximity to free margins a W-plasty was deemed most appropriate for repair.  Using a sterile surgical marker, the appropriate transposition arms of the W-plasty were drawn incorporating the defect and placing the expected incisions within the relaxed skin tension lines where possible.    The area thus outlined was incised deep to adipose tissue with a #15 scalpel blade.  The skin margins were undermined to an appropriate distance in all directions utilizing iris scissors.  The opposing transposition arms were then transposed into place in opposite direction and anchored with interrupted buried subcutaneous sutures. Z Plasty Text: The lesion was extirpated to the level of the fat with a #15 scalpel blade.  Given the location of the defect, shape of the defect and the proximity to free margins a Z-plasty was deemed most appropriate for repair.  Using a sterile surgical marker, the appropriate transposition arms of the Z-plasty were drawn incorporating the defect and placing the expected incisions within the relaxed skin tension lines where possible.    The area thus outlined was incised deep to adipose tissue with a #15 scalpel blade.  The skin margins were undermined to an appropriate distance in all directions utilizing iris scissors.  The opposing transposition arms were then transposed into place in opposite direction and anchored with interrupted buried subcutaneous sutures. Cheek Interpolation Flap Text: A decision was made to reconstruct the defect utilizing an interpolation axial flap and a staged reconstruction.  A telfa template was made of the defect.  This telfa template was then used to outline the Cheek Interpolation flap.  The donor area for the pedicle flap was then injected with anesthesia.  The flap was excised through the skin and subcutaneous tissue down to the layer of the underlying musculature.  The interpolation flap was carefully excised within this deep plane to maintain its blood supply.  The edges of the donor site were undermined.   The donor site was closed in a primary fashion.  The pedicle was then rotated into position and sutured.  Once the tube was sutured into place, adequate blood supply was confirmed with blanching and refill.  The pedicle was then wrapped with xeroform gauze and dressed appropriately with a telfa and gauze bandage to ensure continued blood supply and protect the attached pedicle. Cheek-To-Nose Interpolation Flap Text: A decision was made to reconstruct the defect utilizing an interpolation axial flap and a staged reconstruction.  A telfa template was made of the defect.  This telfa template was then used to outline the Cheek-To-Nose Interpolation flap.  The donor area for the pedicle flap was then injected with anesthesia.  The flap was excised through the skin and subcutaneous tissue down to the layer of the underlying musculature.  The interpolation flap was carefully excised within this deep plane to maintain its blood supply.  The edges of the donor site were undermined.   The donor site was closed in a primary fashion.  The pedicle was then rotated into position and sutured.  Once the tube was sutured into place, adequate blood supply was confirmed with blanching and refill.  The pedicle was then wrapped with xeroform gauze and dressed appropriately with a telfa and gauze bandage to ensure continued blood supply and protect the attached pedicle. Interpolation Flap Text: A decision was made to reconstruct the defect utilizing an interpolation axial flap and a staged reconstruction.  A telfa template was made of the defect.  This telfa template was then used to outline the interpolation flap.  The donor area for the pedicle flap was then injected with anesthesia.  The flap was excised through the skin and subcutaneous tissue down to the layer of the underlying musculature.  The interpolation flap was carefully excised within this deep plane to maintain its blood supply.  The edges of the donor site were undermined.   The donor site was closed in a primary fashion.  The pedicle was then rotated into position and sutured.  Once the tube was sutured into place, adequate blood supply was confirmed with blanching and refill.  The pedicle was then wrapped with xeroform gauze and dressed appropriately with a telfa and gauze bandage to ensure continued blood supply and protect the attached pedicle. Melolabial Interpolation Flap Text: A decision was made to reconstruct the defect utilizing an interpolation axial flap and a staged reconstruction.  A telfa template was made of the defect.  This telfa template was then used to outline the melolabial interpolation flap.  The donor area for the pedicle flap was then injected with anesthesia.  The flap was excised through the skin and subcutaneous tissue down to the layer of the underlying musculature.  The pedicle flap was carefully excised within this deep plane to maintain its blood supply.  The edges of the donor site were undermined.   The donor site was closed in a primary fashion.  The pedicle was then rotated into position and sutured.  Once the tube was sutured into place, adequate blood supply was confirmed with blanching and refill.  The pedicle was then wrapped with xeroform gauze and dressed appropriately with a telfa and gauze bandage to ensure continued blood supply and protect the attached pedicle. Mastoid Interpolation Flap Text: A decision was made to reconstruct the defect utilizing an interpolation axial flap and a staged reconstruction.  A telfa template was made of the defect.  This telfa template was then used to outline the mastoid interpolation flap.  The donor area for the pedicle flap was then injected with anesthesia.  The flap was excised through the skin and subcutaneous tissue down to the layer of the underlying musculature.  The pedicle flap was carefully excised within this deep plane to maintain its blood supply.  The edges of the donor site were undermined.   The donor site was closed in a primary fashion.  The pedicle was then rotated into position and sutured.  Once the tube was sutured into place, adequate blood supply was confirmed with blanching and refill.  The pedicle was then wrapped with xeroform gauze and dressed appropriately with a telfa and gauze bandage to ensure continued blood supply and protect the attached pedicle. Posterior Auricular Interpolation Flap Text: A decision was made to reconstruct the defect utilizing an interpolation axial flap and a staged reconstruction.  A telfa template was made of the defect.  This telfa template was then used to outline the posterior auricular interpolation flap.  The donor area for the pedicle flap was then injected with anesthesia.  The flap was excised through the skin and subcutaneous tissue down to the layer of the underlying musculature.  The pedicle flap was carefully excised within this deep plane to maintain its blood supply.  The edges of the donor site were undermined.   The donor site was closed in a primary fashion.  The pedicle was then rotated into position and sutured.  Once the tube was sutured into place, adequate blood supply was confirmed with blanching and refill.  The pedicle was then wrapped with xeroform gauze and dressed appropriately with a telfa and gauze bandage to ensure continued blood supply and protect the attached pedicle. Paramedian Forehead Flap Text: A decision was made to reconstruct the defect utilizing an interpolation axial flap and a staged reconstruction.  A telfa template was made of the defect.  This telfa template was then used to outline the paramedian forehead pedicle flap.  The donor area for the pedicle flap was then injected with anesthesia.  The flap was excised through the skin and subcutaneous tissue down to the layer of the underlying musculature.  The pedicle flap was carefully excised within this deep plane to maintain its blood supply.  The edges of the donor site were undermined.   The donor site was closed in a primary fashion.  The pedicle was then rotated into position and sutured.  Once the tube was sutured into place, adequate blood supply was confirmed with blanching and refill.  The pedicle was then wrapped with xeroform gauze and dressed appropriately with a telfa and gauze bandage to ensure continued blood supply and protect the attached pedicle. Cheiloplasty (Less Than 50%) Text: A decision was made to reconstruct the defect with a  cheiloplasty.  The defect was undermined extensively.  Additional obicularis oris muscle was excised with a 15 blade scalpel.  The defect was converted into a full thickness wedge, of less than 50% of the vertical height of the lip, to facilite a better cosmetic result.  Small vessels were then tied off with 5-0 monocyrl. The obicularis oris, superficial fascia, adipose and dermis were then reapproximated.  After the deeper layers were approximated the epidermis was reapproximated with particular care given to realign the vermilion border. Cheiloplasty (Complex) Text: A decision was made to reconstruct the defect with a  cheiloplasty.  The defect was undermined extensively.  Additional obicularis oris muscle was excised with a 15 blade scalpel.  The defect was converted into a full thickness wedge to facilite a better cosmetic result.  Small vessels were then tied off with 5-0 monocyrl. The obicularis oris, superficial fascia, adipose and dermis were then reapproximated.  After the deeper layers were approximated the epidermis was reapproximated with particular care given to realign the vermilion border. Ear Wedge Repair Text: A wedge excision was completed by carrying down an excision through the full thickness of the ear and cartilage with an inward facing Burow's triangle. The wound was then closed in a layered fashion. Full Thickness Lip Wedge Repair (Flap) Text: Given the location of the defect and the proximity to free margins a full thickness wedge repair was deemed most appropriate.  Using a sterile surgical marker, the appropriate repair was drawn incorporating the defect and placing the expected incisions perpendicular to the vermilion border.  The vermilion border was also meticulously outlined to ensure appropriate reapproximation during the repair.  The area thus outlined was incised through and through with a #15 scalpel blade.  The muscularis and dermis were reaproximated with deep sutures following hemostasis. Care was taken to realign the vermilion border before proceeding with the superficial closure.  Once the vermilion was realigned the superfical and mucosal closure was finished. Ftsg Text: The defect edges were debeveled with a #15 scalpel blade.  Given the location of the defect, shape of the defect and the proximity to free margins a full thickness skin graft was deemed most appropriate.  Using a sterile surgical marker, the primary defect shape was transferred to the donor site. The area thus outlined was incised deep to adipose tissue with a #15 scalpel blade.  The harvested graft was then trimmed of adipose tissue until only dermis and epidermis was left.  The skin margins of the secondary defect were undermined to an appropriate distance in all directions utilizing iris scissors.  The secondary defect was closed with interrupted buried subcutaneous sutures.  The skin edges were then re-apposed with running  sutures.  The skin graft was then placed in the primary defect and oriented appropriately. Split-Thickness Skin Graft Text: The defect edges were debeveled with a #15 scalpel blade.  Given the location of the defect, shape of the defect and the proximity to free margins a split thickness skin graft was deemed most appropriate.  Using a sterile surgical marker, the primary defect shape was transferred to the donor site. The split thickness graft was then harvested.  The skin graft was then placed in the primary defect and oriented appropriately. Cartilage Graft Text: The defect edges were debeveled with a #15 scalpel blade.  Given the location of the defect, shape of the defect, the fact the defect involved a full thickness cartilage defect a cartilage graft was deemed most appropriate.  An appropriate donor site was identified, cleansed, and anesthetized. The cartilage graft was then harvested and transferred to the recipient site, oriented appropriately and then sutured into place.  The secondary defect was then repaired using a primary closure. Composite Graft Text: The defect edges were debeveled with a #15 scalpel blade.  Given the location of the defect, shape of the defect, the proximity to free margins and the fact the defect was full thickness a composite graft was deemed most appropriate.  The defect was outline and then transferred to the donor site.  A full thickness graft was then excised from the donor site. The graft was then placed in the primary defect, oriented appropriately and then sutured into place.  The secondary defect was then repaired using a primary closure. Epidermal Autograft Text: The defect edges were debeveled with a #15 scalpel blade.  Given the location of the defect, shape of the defect and the proximity to free margins an epidermal autograft was deemed most appropriate.  Using a sterile surgical marker, the primary defect shape was transferred to the donor site. The epidermal graft was then harvested.  The skin graft was then placed in the primary defect and oriented appropriately. Dermal Autograft Text: The defect edges were debeveled with a #15 scalpel blade.  Given the location of the defect, shape of the defect and the proximity to free margins a dermal autograft was deemed most appropriate.  Using a sterile surgical marker, the primary defect shape was transferred to the donor site. The area thus outlined was incised deep to adipose tissue with a #15 scalpel blade.  The harvested graft was then trimmed of adipose and epidermal tissue until only dermis was left.  The skin graft was then placed in the primary defect and oriented appropriately. Skin Substitute Text: The defect edges were debeveled with a #15 scalpel blade.  Given the location of the defect, shape of the defect and the proximity to free margins a skin substitute graft was deemed most appropriate.  The graft material was trimmed to fit the size of the defect. The graft was then placed in the primary defect and oriented appropriately. Tissue Cultured Epidermal Autograft Text: The defect edges were debeveled with a #15 scalpel blade.  Given the location of the defect, shape of the defect and the proximity to free margins a tissue cultured epidermal autograft was deemed most appropriate.  The graft was then trimmed to fit the size of the defect.  The graft was then placed in the primary defect and oriented appropriately. Xenograft Text: The defect edges were debeveled with a #15 scalpel blade.  Given the location of the defect, shape of the defect and the proximity to free margins a xenograft was deemed most appropriate.  The graft was then trimmed to fit the size of the defect.  The graft was then placed in the primary defect and oriented appropriately. Purse String (Simple) Text: Given the location of the defect and the characteristics of the surrounding skin a pursestring closure was deemed most appropriate.  Undermining was performed circumfirentially around the surgical defect.  A purstring suture was then placed and tightened. Purse String (Intermediate) Text: Given the location of the defect and the characteristics of the surrounding skin a pursestring intermediate closure was deemed most appropriate.  Undermining was performed circumfirentially around the surgical defect.  A purstring suture was then placed and tightened. Partial Purse String (Simple) Text: Given the location of the defect and the characteristics of the surrounding skin a simple purse string closure was deemed most appropriate.  Undermining was performed circumfirentially around the surgical defect.  A purse string suture was then placed and tightened. Wound tension only allowed a partial closure of the circular defect. Partial Purse String (Intermediate) Text: Given the location of the defect and the characteristics of the surrounding skin an intermediate purse string closure was deemed most appropriate.  Undermining was performed circumfirentially around the surgical defect.  A purse string suture was then placed and tightened. Wound tension only allowed a partial closure of the circular defect. Localized Dermabrasion With Wire Brush Text: The patient was draped in routine manner.  Localized dermabrasion using 3 x 17 mm wire brush was performed in routine manner to papillary dermis. This spot dermabrasion is being performed to complete skin cancer reconstruction. It also will eliminate the other sun damaged precancerous cells that are known to be part of the regional effect of a lifetime's worth of sun exposure. This localized dermabrasion is therapeutic and should not be considered cosmetic in any regard. Tarsorrhaphy Text: A tarsorrhaphy was performed using Frost sutures. Complex Repair And Flap Additional Text (Will Appearing After The Standard Complex Repair Text): The complex repair was not sufficient to completely close the primary defect. The remaining additional defect was repaired with the flap mentioned below. Complex Repair And Graft Additional Text (Will Appearing After The Standard Complex Repair Text): The complex repair was not sufficient to completely close the primary defect. The remaining additional defect was repaired with the graft mentioned below. Manual Repair Warning Statement: We plan on removing the manually selected variable below in favor of our much easier automatic structured text blocks found in the previous tab. We decided to do this to help make the flow better and give you the full power of structured data. Manual selection is never going to be ideal in our platform and I would encourage you to avoid using manual selection from this point on, especially since I will be sunsetting this feature. It is important that you do one of two things with the customized text below. First, you can save all of the text in a word file so you can have it for future reference. Second, transfer the text to the appropriate area in the Library tab. Lastly, if there is a flap or graft type which we do not have you need to let us know right away so I can add it in before the variable is hidden. No need to panic, we plan to give you roughly 6 months to make the change. Same Histology In Subsequent Stages Text: The pattern and morphology of the tumor is as described in the first stage. No Residual Tumor Seen Histology Text: There were no malignant cells seen in the sections examined. Inflammation Suggestive Of Cancer Camouflage Histology Text: There was a dense lymphocytic infiltrate which prevented adequate histologic evaluation of adjacent structures. Bcc Histology Text: There were numerous aggregates of basaloid cells. Bcc Infiltrative Histology Text: There were numerous aggregates of basaloid cells demonstrating an infiltrative pattern. ia Id #: [id number] Information: Selecting Yes will display possible errors in your note based on the variables you have selected. This validation is only offered as a suggestion for you. PLEASE NOTE THAT THE VALIDATION TEXT WILL BE REMOVED WHEN YOU FINALIZE YOUR NOTE. IF YOU WANT TO FAX A PRELIMINARY NOTE YOU WILL NEED TO TOGGLE THIS TO 'NO' IF YOU DO NOT WANT IT IN YOUR FAXED NOTE.

## 2023-09-23 NOTE — ED PROVIDER NOTE - NSICDXPASTMEDICALHX_GEN_ALL_CORE_FT
PAST MEDICAL HISTORY:  Arthritis     Deep vein thrombosis (DVT) Right leg 2016 ? unknown cause    [place] (hard of hearing) uses hearing aid    Left carpal tunnel syndrome     Squamous cell carcinoma

## 2023-09-23 NOTE — ED ADULT NURSE NOTE - CHIEF COMPLAINT
Have Your Skin Lesions Been Treated?: not been treated
Is This A New Presentation, Or A Follow-Up?: Skin Lesions
How Severe Is Your Skin Lesion?: mild
The patient is a 88y Male complaining of shortness of breath.

## 2023-09-23 NOTE — ED PROVIDER NOTE - PHYSICAL EXAMINATION
GEN - NAD; well appearing; A+O x3  HEAD - NC/AT    EYES - EOMI, no conjunctival pallor, no scleral icterus  ENT -   mucous membranes  moist , no discharge  NECK - Neck supple  PULM -rales at the right base  COR -  RRR, S1 S2, no murmurs  ABD - ND, NT, soft, no guarding, no rebound, no masses    BACK - no CVA tenderness, nontender spine     EXTREMS -no edema, no deformity, warm and well perfused   SKIN - no rash or bruising      NEUROLOGIC - alert, sensation nl, motor 5/5 RUE/LUE/RLE/LLE

## 2023-09-23 NOTE — ED PROVIDER NOTE - CLINICAL SUMMARY MEDICAL DECISION MAKING FREE TEXT BOX
Patient with episode of shortness of breath and chest pain history of COPD, rales at right base on exam which patient states is chronic, will check chest x-ray rule out pneumonia, will check cardiac enzymes and EKG rule out ACS, will ambulate and reassess.. Patient with episode of shortness of breath and chest pain history of COPD, rales at right base on exam which patient states is chronic, will check chest x-ray rule out pneumonia, will check cardiac enzymes and EKG rule out ACS, will ambulate and reassess.
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
## 2023-09-23 NOTE — H&P ADULT - HISTORY OF PRESENT ILLNESS
HPI:      PAST MEDICAL & SURGICAL HISTORY:  Squamous cell carcinoma      [place] (hard of hearing)  uses hearing aid      Arthritis      Deep vein thrombosis (DVT)  Right leg 2016 ? unknown cause      Left carpal tunnel syndrome      H/O inguinal hernia repair  2017 right      H/O colonoscopy  2014      H/O squamous cell carcinoma excision  Bilateral ear 2018      S/P cataract surgery  2009 bilateral      S/P foot surgery, right  1968      S/P arthroscopy of right shoulder  2019      History of carpal tunnel surgery of right wrist  2019          Review of Systems:   CONSTITUTIONAL: No fever.  EYES: No eye pain or discharge.  ENMT:  No sinus or throat pain  NECK: No pain or stiffness  RESPIRATORY: No cough, wheezing, chills or hemoptysis; No shortness of breath  CARDIOVASCULAR: No chest pain, palpitations, dizziness, or leg swelling  GASTROINTESTINAL: No abdominal or epigastric pain. No nausea, vomiting, or hematemesis; No diarrhea or constipation. No melena or hematochezia.  GENITOURINARY: No dysuria or incontinence  NEUROLOGICAL: No headaches, memory loss, loss of strength, numbness, or tremors  SKIN: No rashes.  MUSCULOSKELETAL: No joint pain or swelling; No muscle, back, or extremity pain  PSYCHIATRIC: No depression, anxiety, mood swings, or difficulty sleeping    Social History:     FAMILY HISTORY:  FH: bladder cancer  father    FHx: uterine cancer  sister        MEDICATIONS  (STANDING):  atorvastatin 80 milliGRAM(s) Oral at bedtime  buDESOnide    Inhalation Suspension 0.5 milliGRAM(s) Inhalation daily  famotidine    Tablet 20 milliGRAM(s) Oral two times a day  heparin  Infusion.  Unit(s)/Hr (7.5 mL/Hr) IV Continuous <Continuous>  metoprolol tartrate 12.5 milliGRAM(s) Oral every 12 hours    MEDICATIONS  (PRN):  heparin   Injectable 3800 Unit(s) IV Push every 6 hours PRN For aPTT less than 40  nitroglycerin     SubLingual 0.4 milliGRAM(s) SubLingual every 5 minutes PRN Chest Pain      CAPILLARY BLOOD GLUCOSE        I&O's Summary      PHYSICAL EXAM:  Vital Signs Last 24 Hrs  T(C): 36.6 (23 Sep 2023 10:46), Max: 36.6 (23 Sep 2023 10:46)  T(F): 97.8 (23 Sep 2023 10:46), Max: 97.8 (23 Sep 2023 10:46)  HR: 92 (23 Sep 2023 12:02) (92 - 106)  BP: 115/73 (23 Sep 2023 12:02) (115/73 - 126/74)  BP(mean): --  RR: 26 (23 Sep 2023 12:02) (22 - 26)  SpO2: 100% (23 Sep 2023 12:02) (95% - 100%)    Parameters below as of 23 Sep 2023 12:02  Patient On (Oxygen Delivery Method): nasal cannula  O2 Flow (L/min): 3    GENERAL: NAD, well-developed  HEAD:  Atraumatic, Normocephalic  EYES: EOMI, PERRLA, conjunctiva and sclera clear  ENT: normal hearing, no nasal discharge, throat clear, dentition normal  NECK: Supple, No JVD, no LAD, no thyromegaly   CHEST/LUNG: Clear to auscultation bilaterally; No wheeze, respirations unlabored  HEART: Regular rate and rhythm; No murmurs, rubs, or gallops  ABDOMEN: Soft, Nontender, Nondistended; Bowel sounds present, no HSM  EXTREMITIES:  2+ Peripheral Pulses, No clubbing, cyanosis, or edema  PSYCH: AAOx3, normal behavior  NEUROLOGY: non-focal, sensory and cn 2-12 intact, speech/language intact  SKIN: No visible rashes or lesions    LABS:                        11.5   7.74  )-----------( 173      ( 23 Sep 2023 11:55 )             35.1     09-23    137  |  109<H>  |  25<H>  ----------------------------<  108<H>  4.1   |  24  |  1.02    Ca    8.4<L>      23 Sep 2023 11:55    TPro  6.1  /  Alb  2.9<L>  /  TBili  0.4  /  DBili  x   /  AST  20  /  ALT  26  /  AlkPhos  104  09-23    PT/INR - ( 23 Sep 2023 11:55 )   PT: 12.7 sec;   INR: 1.13 ratio         PTT - ( 23 Sep 2023 11:55 )  PTT:31.8 sec      Urinalysis Basic - ( 23 Sep 2023 13:05 )    Color: Yellow / Appearance: Clear / S.005 / pH: x  Gluc: x / Ketone: Negative  / Bili: Negative / Urobili: Negative   Blood: x / Protein: 15 / Nitrite: Negative   Leuk Esterase: Negative / RBC: 0-2 /HPF / WBC 0-2 /HPF   Sq Epi: x / Non Sq Epi: x / Bacteria: Negative        RADIOLOGY & ADDITIONAL TESTS:    Imaging Personally Reviewed:    EKG Personally Reviewed:    Consultant(s) Notes Reviewed:      Care Discussed with Consultants/Other Providers:   HPI:  88M w/PMH ILD/COPD/asthma/chronic resp failure on 2LNC continuous at home (lifetime nonsmoker), HLD, presents c/o left sided chest pain that began earlier today when he was outside placing a pump in his yard in anticipation of flooding.  He endorses that he suddenly became sob above baseline  and left CP, radiating to his left shoulder/arm. He went inside, called him EMS, was instructed to take 4x asa 81mg, which he did and then BIBEMS for further eval.  He denies any prior similar episode, no h/o MI/CAD.  His cp resolved after >1 hr but continues to be sob at rest and gets dyspneic w/ conversation.  He denies any associated palpitations, no recent n/v/d, abd pain, f/c, dysuria.      In ED, trop 390, bnp 3k, Cardio, Dr Ren called per ED and recommended to start hep gtt  CTA  chest No pulmonary embolus is noted.  Marked decrease in the left upper lobe consolidation when compared to previous exam.    PAST MEDICAL & SURGICAL HISTORY:  Squamous cell carcinoma  [place] (hard of hearing) uses hearing aid  Arthritis  Deep vein thrombosis (DVT) Right leg 2016 ? unknown cause  Left carpal tunnel syndrome  H/O inguinal hernia repair 2017 right  H/O colonoscopy 2014  H/O squamous cell carcinoma excision Bilateral ear 2018  S/P cataract surgery 2009 bilateral  S/P foot surgery, right 1968  S/P arthroscopy of right shoulder 2019  History of carpal tunnel surgery of right wrist 2019      Review of Systems:   CONSTITUTIONAL: No fever.  EYES: No eye pain or discharge.  ENMT:  No sinus or throat pain  NECK: No pain or stiffness  RESPIRATORY: No cough, wheezing, chills or hemoptysis; ++ shortness of breath  CARDIOVASCULAR: See HPI ++++  GASTROINTESTINAL: No abdominal or epigastric pain. No nausea, vomiting, or hematemesis; No diarrhea or constipation. No melena or hematochezia.  GENITOURINARY: No dysuria or incontinence  NEUROLOGICAL: No headaches, memory loss, loss of strength, numbness, or tremors  SKIN: No rashes.  MUSCULOSKELETAL: No joint pain or swelling; No muscle, back, or extremity pain  PSYCHIATRIC: No depression, anxiety, mood swings, or difficulty sleeping    Social History:   never smoked  +etoh: scotch on   lives w/ wife, ind ADLs      FAMILY HISTORY:  FH: bladder cancer father, FHx: uterine cancer sister, Mother  of pneumonia    MEDICATIONS  (STANDING):  atorvastatin 80 milliGRAM(s) Oral at bedtime  buDESOnide    Inhalation Suspension 0.5 milliGRAM(s) Inhalation daily  famotidine    Tablet 20 milliGRAM(s) Oral two times a day  heparin  Infusion.  Unit(s)/Hr (7.5 mL/Hr) IV Continuous <Continuous>  metoprolol tartrate 12.5 milliGRAM(s) Oral every 12 hours    MEDICATIONS  (PRN):  heparin   Injectable 3800 Unit(s) IV Push every 6 hours PRN For aPTT less than 40  nitroglycerin     SubLingual 0.4 milliGRAM(s) SubLingual every 5 minutes PRN Chest Pain        PHYSICAL EXAM:  Vital Signs Last 24 Hrs  T(C): 36.6 (23 Sep 2023 10:46), Max: 36.6 (23 Sep 2023 10:46)  T(F): 97.8 (23 Sep 2023 10:46), Max: 97.8 (23 Sep 2023 10:46)  HR: 92 (23 Sep 2023 12:02) (92 - 106)  BP: 115/73 (23 Sep 2023 12:02) (115/73 - 126/74)  BP(mean): --  RR: 26 (23 Sep 2023 12:02) (22 - 26)  SpO2: 100% (23 Sep 2023 12:02) (95% - 100%)    Parameters below as of 23 Sep 2023 12:02  Patient On (Oxygen Delivery Method): nasal cannula  O2 Flow (L/min): 3  GENERAL: NAD, mildly dyspneic w/ conversation  HEAD:  Atraumatic, Normocephalic  EYES: EOMI, PERRLA, conjunctiva and sclera clear  ENT: normal hearing, no nasal discharge, throat clear, dentition normal  NECK: Supple, No JVD, no LAD, no thyromegaly   CHEST/LUNG: RT base crackles (chronic); No wheeze, respirations mildly labored   HEART: Regular rate and rhythm; No murmurs, rubs, or gallops  ABDOMEN: Soft, Nontender, Nondistended; Bowel sounds present, no HSM  EXTREMITIES:  2+ Peripheral Pulses, No clubbing, cyanosis, +trace b/l pitting edema (chronic)  PSYCH: AAOx3, normal behavior  NEUROLOGY: non-focal, sensory and cn 2-12 intact, speech/language intact  SKIN: No visible rashes or lesions    LABS:                        11.5   7.74  )-----------( 173      ( 23 Sep 2023 11:55 )             35.1         137  |  109<H>  |  25<H>  ----------------------------<  108<H>  4.1   |  24  |  1.02    Ca    8.4<L>      23 Sep 2023 11:55    TPro  6.1  /  Alb  2.9<L>  /  TBili  0.4  /  DBili  x   /  AST  20  /  ALT  26  /  AlkPhos  104      PT/INR - ( 23 Sep 2023 11:55 )   PT: 12.7 sec;   INR: 1.13 ratio         PTT - ( 23 Sep 2023 11:55 )  PTT:31.8 sec      Urinalysis Basic - ( 23 Sep 2023 13:05 )    Color: Yellow / Appearance: Clear / S.005 / pH: x  Gluc: x / Ketone: Negative  / Bili: Negative / Urobili: Negative   Blood: x / Protein: 15 / Nitrite: Negative   Leuk Esterase: Negative / RBC: 0-2 /HPF / WBC 0-2 /HPF   Sq Epi: x / Non Sq Epi: x / Bacteria: Negative        RADIOLOGY & ADDITIONAL TESTS:    Imaging Personally Reviewed:  CTA  chest No pulmonary embolus is noted.  Marked decrease in the left upper lobe consolidation when compared to previous exam.    EKG Personally Reviewed:  no ST changes  Consultant(s) Notes Reviewed:      Care Discussed with Consultants/Other Providers:

## 2023-09-23 NOTE — PATIENT PROFILE ADULT - DO YOU LACK THE NECESSARY SUPPORT TO HELP YOU COPE WITH LIFE CHALLENGES?
Chief Complaint   Patient presents with    Nasal Congestion     stuffy nose since october with no improvement. F/U from allergy medication (xyzal) not helpful. HPI:      Raf Sarah is a 26 y.o. female. She is Rome's granddaughter. She finished up her degree at [employer] for communications. She hopes to start her masters degree soon. Tachycardia: Symptoms started at age 16. She was told that her 25 year brother passed of \"cardiac dysrhythmia\". Her mother had a pacemaker placed in her 45s due to complete heart block. Her grandmother has a metallic valve s/t rheumatic heart disease. She was sent to Dr. Annmarie Caban for a second opinion in 2021. She wore a Holter monitor from 2/23/21-3/24/21 that showed periods of sinus tachycardiac with no significant extrasystole or arrhythmia. She had a normal ECHO on 2/24/21. She was told to drink sports drinks daily, elevate her HOB and wear compression stockings. Would consider BB in the future for symptoms. New Issues:  She has been having trouble with nasal congestion for months. Worsening. She had a basketball hit her in the face in highschool and displace her nose. It has always been a little crooked since then. She cannot breathe out of the left side of her nose at all a majority of the times. Some mornings, she wakes with a nose bleed. Thin, bright, red blood. No clots. She has tried breath right strips, Xyzal, Claritin, Zyrtec, Flonase and nasal saline. She has never seen at ENT. No Known Allergies    Current Outpatient Medications   Medication Sig    levocetirizine (XYZAL) 5 mg tablet Take 1 Tablet by mouth daily.  norgestrel-ethinyl estradiol (CRYSELLE, 28,) 0.3-30 mg-mcg tab Take 1 Tab by mouth. No current facility-administered medications for this visit. Past Medical History:   Diagnosis Date    H/O bladder infections     Menarche     onset at age 6       No past surgical history on file.     Social History     Socioeconomic History    Marital status: SINGLE   Tobacco Use    Smoking status: Never Smoker    Smokeless tobacco: Never Used   Vaping Use    Vaping Use: Never used   Substance and Sexual Activity    Alcohol use: No    Drug use: No    Sexual activity: Never     Birth control/protection: Pill       Family History   Problem Relation Age of Onset    Other Brother 25        cardiac dysrythmia    Hypertension Maternal Grandmother     Heart Disease Maternal Grandmother     Heart Failure Maternal Grandmother     Asthma Maternal Grandfather     Diabetes Maternal Grandfather     Stroke Maternal Grandfather     Other Maternal Grandfather         brain tumor    Depression Mother     Pacemaker Mother        Above history reviewed. ROS:  Denies fever, chills, cough, chest pain, SOB,  nausea, vomiting, or diarrhea. Denies wt loss, wt gain, hemoptysis, hematochezia or melena. Physical Examination:    /80 (BP 1 Location: Right arm, BP Patient Position: Sitting, BP Cuff Size: Adult long)   Pulse (!) 101   Temp 98.3 °F (36.8 °C) (Oral)   Resp 17   Ht 5' 5.5\" (1.664 m)   Wt 222 lb (100.7 kg)   LMP 12/30/2021 (Exact Date) Comment: normal  SpO2 96%   BMI 36.38 kg/m²     General: Alert and Ox3, Fluent speech  HEENT:  PERRLA, EOM intact, TMs, left nasal passage with inflamed turbinates and narrowing, right turbinates normal, pharynx normal.  No thyromegaly. No cervical adenopathy. Neck:  Supple, no adenopathy, JVD, mass or bruit  Chest:  Clear to Ausculation, without wheezes, rales, rubs or ronchi  Cardiac: Tachycardia  Abdomen:  +BS, soft, nontender without palpable HSM  Extremities:  No cyanosis, clubbing or edema  Neurologic:  Ambulatory without assist, CN 2-12 grossly intact. Moves all extremities. Skin: no rash  Lymphadenopathy: no cervical or supraclavicular nodes    ASSESSMENT AND PLAN:     1.  Nasal congestion  Antihistamine and Flonase  Add Singulair  ENT assessment for possible structural abnormality - montelukast (SINGULAIR) 10 mg tablet; Take 1 Tablet by mouth daily. Dispense: 90 Tablet; Refill: 4  - fluticasone propionate (FLONASE) 50 mcg/actuation nasal spray; 1 [place] by Both Nostrils route daily. Indications: inflammation of the nose due to an allergy  Dispense: 1 Each; Refill: 12  - fexofenadine (Allegra Allergy) 180 mg tablet; Take 1 Tablet by mouth daily. Indications: inflammation of the nose due to an allergy  Dispense: 90 Tablet; Refill: 4  - REFERRAL TO ENT-OTOLARYNGOLOGY    2. Tachycardia  Working out at [facility] with a  and doing well     3.  Recurrent epistaxis  Add humidifier   - REFERRAL TO ENT-OTOLARYNGOLOGY     RTC PRN    Laura Tejada NP no

## 2023-09-23 NOTE — ED PROVIDER NOTE - WET READ LAUNCH FT
Recent loss of her son   with ALS  Start Sertraline 25mg daily  Refer to therapist  Follow up in 8 weeks   There are no Wet Read(s) to document.

## 2023-09-24 NOTE — PROGRESS NOTE ADULT - SUBJECTIVE AND OBJECTIVE BOX
88 M w/PMH ILD/COPD/asthma/chronic resp failure on 2LNC continuous at home (lifetime nonsmoker), HLD, presents c/o left sided chest pain that began earlier today when he was outside placing a pump in his yard in anticipation of flooding.  He endorses that he suddenly became sob above baseline  and left CP, radiating to his left shoulder/arm. He went inside, called him EMS, was instructed to take 4x asa 81mg, which he did and then BIBEMS for further eval.  He denies any prior similar episode, no h/o MI/CAD.  His cp resolved after >1 hr but continues to be sob at rest and gets dyspneic w/ conversation.  He denies any associated palpitations, no recent n/v/d, abd pain, f/c, dysuria.    -found to have a NSTEMI, startred on DAPT, Heparin IV    9/24: no cp, no sob            REVIEW OF SYSTEMS:    CONSTITUTIONAL: No weakness, No fevers or chills  ENT: No ear ache, No sorethroat  NECK: No pain, No stiffness  RESPIRATORY: No cough, No wheezing, No hemoptysis; No dyspnea  CARDIOVASCULAR: No chest pain, No palpitations  GASTROINTESTINAL: No abd pain, No nausea, No vomiting, No hematemesis, No diarrhea or constipation. No melena, No hematochezia.  GENITOURINARY: No dysuria, No  hematuria  NEUROLOGICAL: No diplopia, No paresthesia, No motor dysfunction  MUSCULOSKELETAL: No arthralgia, No myalgia  SKIN: No rashes, or lesions   PSYCH: no anxiety, no suicidal ideation    All other review of systems is negative unless indicated above    Vital Signs Last 24 Hrs  T(C): 36.5 (24 Sep 2023 15:39), Max: 36.7 (24 Sep 2023 00:32)  T(F): 97.7 (24 Sep 2023 15:39), Max: 98.1 (24 Sep 2023 00:32)  HR: 69 (24 Sep 2023 15:39) (69 - 82)  BP: 104/54 (24 Sep 2023 15:39) (101/62 - 111/69)  BP(mean): --  RR: 18 (24 Sep 2023 15:39) (18 - 18)  SpO2: 100% (24 Sep 2023 15:39) (98% - 100%)    Parameters below as of 24 Sep 2023 15:39  Patient On (Oxygen Delivery Method): nasal cannula  O2 Flow (L/min): 3      PHYSICAL EXAM:    GENERAL: NAD  HEENT:  NC/AT, EOMI, PERRLA, No scleral icterus, Moist mucous membranes  NECK: Supple, No JVD  CNS:  Alert & Oriented X3, Motor Strength 5/5 B/L upper and lower extremities; DTRs 2+ intact   LUNG: Normal Breath sounds, +bilat rales, No rhonchi, No wheezing  HEART: RRR; No murmurs, No rubs  ABDOMEN: +BS, ST/ND/NT  GENITOURINARY: Voiding, Bladder not distended  EXTREMITIES:  2+ Peripheral Pulses, No clubbing, No cyanosis, No tibial edema  MUSCULOSKELTAL: Joints normal ROM, No TTP, No effusion  VAGINAL: deferred  SKIN: no rashes  RECTAL: deferred, not indicated  BREAST: deferred                          12.8   6.47  )-----------( 205      ( 24 Sep 2023 09:12 )             38.7     09-23    137  |  109<H>  |  25<H>  ----------------------------<  108<H>  4.1   |  24  |  1.02    Ca    8.4<L>      23 Sep 2023 11:55  Mg     2.2     09-24    TPro  6.1  /  Alb  2.9<L>  /  TBili  0.4  /  DBili  x   /  AST  20  /  ALT  26  /  AlkPhos  104  09-23    Vancomycin levels:   Cultures:     MEDICATIONS  (STANDING):  aspirin enteric coated 81 milliGRAM(s) Oral daily  atorvastatin 80 milliGRAM(s) Oral at bedtime  buDESOnide    Inhalation Suspension 0.5 milliGRAM(s) Inhalation daily  famotidine    Tablet 20 milliGRAM(s) Oral two times a day  heparin  Infusion.  Unit(s)/Hr (7.5 mL/Hr) IV Continuous <Continuous>  metoprolol tartrate 12.5 milliGRAM(s) Oral every 12 hours    MEDICATIONS  (PRN):  albuterol    0.083% 2.5 milliGRAM(s) Nebulizer two times a day PRN Shortness of Breath and/or Wheezing  heparin   Injectable 3800 Unit(s) IV Push every 6 hours PRN For aPTT less than 40  nitroglycerin     SubLingual 0.4 milliGRAM(s) SubLingual every 5 minutes PRN Chest Pain      all labs reviewed  all imaging reviewed    a/p:    1. NSTEMI:  ASA/Plavix/BB/IV Heparin  Cardiology ischemic evaluation in AM: [facility]    2. ILD:  c/w Budesonide

## 2023-09-24 NOTE — CONSULT NOTE ADULT - ASSESSMENT
A/P: 88M w/PMH ILD/COPD/asthma/chronic resp failure on 2LNC continuous at home (lifetime nonsmoker), HLD, presents c/o left sided chest pain.    1. Unstable angina. +troponins, CP. Cont IV heparin, asa. Will add plavix 300mg x 1 now.  Cont statin, low dose Bbl. BP/HR stable.   Keep NPO after MN for C tomorrow to assess coronary anatomy.  2Decho pending.  Monitor on tele.   Currently CP free, SR- stable for tele.     2. COPD/ILD. Stable at present. Cont outpt regimen, cont O2.    3. HTN. BP stable at present. Cont low dose Bbl.     4. DVT proph. 
88M w/PMH ILD/COPD/asthma/chronic resp failure on 2LNC continuous at home (lifetime nonsmoker), HLD, presents c/o left sided chest pain that began earlier today when he was outside placing a pump in his yard in anticipation of flooding.  He endorses that he suddenly became sob above baseline  and left CP, radiating to his left shoulder/arm. He went inside, called him EMS, was instructed to take 4x asa 81mg, which he did and then BIBEMS for further eval.  He denies any prior similar episode, no h/o MI/CAD.  His cp resolved after >1 hr but continues to be sob at rest and gets dyspneic w/ conversation.  He denies any associated palpitations, no recent n/v/d, abd pain, f/c, dysuria.      In ED, trop 390, bnp 3k, Cardio, Dr Ren called per ED and recommended to start hep gtt  CTA  chest No pulmonary embolus is noted.  Marked decrease in the left upper lobe consolidation when compared to previous exam.    Assessment / plan:  NSTEMI  LAMAS is multifactorial  CHF cont factor  chronic hypoxemic resp failure  pulm fibrosis with basilar atelectasis  adequate oxygenation with low flow o2 nc    plan for cardiac cath /AC  supplemental o2 therapy  cardiology eval in progress  diuresis as tolerated  Dr Vance is covering 9/25

## 2023-09-24 NOTE — CONSULT NOTE ADULT - SUBJECTIVE AND OBJECTIVE BOX
Cardiology Consultation    HPI: 88M w/PMH ILD/COPD/asthma/chronic resp failure on 2LNC continuous at home (lifetime nonsmoker), HLD, presents c/o left sided chest pain that began earlier today when he was outside placing a pump in his yard in anticipation of flooding.  He endorses that he suddenly became sob above baseline  and left CP, radiating to his left shoulder/arm. He went inside, called him EMS, was instructed to take 4x asa 81mg, which he did and then BIBEMS for further eval.  He denies any prior similar episode, no h/o MI/CAD.  His cp resolved after >1 hr but continues to be sob at rest and gets dyspneic w/ conversation.  He denies any associated palpitations, no recent n/v/d, abd pain, f/c, dysuria.    CTA  chest No pulmonary embolus is noted.  Marked decrease in the left upper lobe consolidation when compared to previous exam.    . Pt seen/examined on 3N. H/o CP with exertion worrsening over last month. Yesterday pt had CP/LUE pain, SOB when moving a pump.  No dizziness/syncope. Went to ER then- +troponins.  No h/o CAD.    PAST MEDICAL & SURGICAL HISTORY:  Squamous cell carcinoma  [place] (hard of hearing) uses hearing aid  Arthritis  Deep vein thrombosis (DVT) Right leg 2016 ? unknown cause  Left carpal tunnel syndrome  H/O inguinal hernia repair 2017 right  H/O colonoscopy 2014  H/O squamous cell carcinoma excision Bilateral ear 2018  S/P cataract surgery 2009 bilateral  S/P foot surgery, right 1968  S/P arthroscopy of right shoulder 2019  History of carpal tunnel surgery of right wrist 2019    Review of Systems:   CONSTITUTIONAL: No fever.  EYES: No eye pain or discharge.  ENMT:  No sinus or throat pain  NECK: No pain or stiffness  RESPIRATORY: No cough, wheezing, chills or hemoptysis; ++ shortness of breath  CARDIOVASCULAR: See HPI ++++  GASTROINTESTINAL: No abdominal or epigastric pain. No nausea, vomiting, or hematemesis; No diarrhea or constipation. No melena or hematochezia.  GENITOURINARY: No dysuria or incontinence  NEUROLOGICAL: No headaches, memory loss, loss of strength, numbness, or tremors  SKIN: No rashes.  MUSCULOSKELETAL: No joint pain or swelling; No muscle, back, or extremity pain  PSYCHIATRIC: No depression, anxiety, mood swings, or difficulty sleeping    Social History:   never smoked  +etoh: scotch on   lives w/ wife, ind ADLs    FAMILY HISTORY:  FH: bladder cancer father, FHx: uterine cancer sister, Mother  of pneumonia    MEDICATIONS  (STANDING):  atorvastatin 80 milliGRAM(s) Oral at bedtime  buDESOnide    Inhalation Suspension 0.5 milliGRAM(s) Inhalation daily  famotidine    Tablet 20 milliGRAM(s) Oral two times a day  heparin  Infusion.  Unit(s)/Hr (7.5 mL/Hr) IV Continuous <Continuous>  metoprolol tartrate 12.5 milliGRAM(s) Oral every 12 hours    MEDICATIONS  (PRN):  heparin   Injectable 3800 Unit(s) IV Push every 6 hours PRN For aPTT less than 40  nitroglycerin     SubLingual 0.4 milliGRAM(s) SubLingual every 5 minutes PRN Chest Pain    PHYSICAL EXAM:  Vital Signs Last 24 Hrs  T(C): 36.6 (23 Sep 2023 10:46), Max: 36.6 (23 Sep 2023 10:46)  T(F): 97.8 (23 Sep 2023 10:46), Max: 97.8 (23 Sep 2023 10:46)  HR: 92 (23 Sep 2023 12:02) (92 - 106)  BP: 115/73 (23 Sep 2023 12:02) (115/73 - 126/74)  BP(mean): --  RR: 26 (23 Sep 2023 12:02) (22 - 26)  SpO2: 100% (23 Sep 2023 12:02) (95% - 100%)    Parameters below as of 23 Sep 2023 12:02  Patient On (Oxygen Delivery Method): nasal cannula  O2 Flow (L/min): 3  GENERAL: NAD, mildly dyspneic w/ conversation  HEAD:  Atraumatic, Normocephalic  EYES: EOMI, PERRLA, conjunctiva and sclera clear  ENT: normal hearing, no nasal discharge, throat clear, dentition normal  NECK: Supple, No JVD, no LAD, no thyromegaly   CHEST/LUNG: RT base crackles (chronic); No wheeze, respirations mildly labored   HEART: Regular rate and rhythm; No murmurs, rubs, or gallops  ABDOMEN: Soft, Nontender, Nondistended; Bowel sounds present, no HSM  EXTREMITIES:  2+ Peripheral Pulses, No clubbing, cyanosis, +trace b/l pitting edema (chronic)    LABS:                        11.5   7.74  )-----------( 173      ( 23 Sep 2023 11:55 )             35.1         137  |  109<H>  |  25<H>  ----------------------------<  108<H>  4.1   |  24  |  1.02    Ca    8.4<L>      23 Sep 2023 11:55    TPro  6.1  /  Alb  2.9<L>  /  TBili  0.4  /  DBili  x   /  AST  20  /  ALT  26  /  AlkPhos  104      PT/INR - ( 23 Sep 2023 11:55 )   PT: 12.7 sec;   INR: 1.13 ratio       PTT - ( 23 Sep 2023 11:55 )  PTT:31.8 sec    Urinalysis Basic - ( 23 Sep 2023 13:05 )    Color: Yellow / Appearance: Clear / S.005 / pH: x  Gluc: x / Ketone: Negative  / Bili: Negative / Urobili: Negative   Blood: x / Protein: 15 / Nitrite: Negative   Leuk Esterase: Negative / RBC: 0-2 /HPF / WBC 0-2 /HPF   Sq Epi: x / Non Sq Epi: x / Bacteria: Negative    PAST MEDICAL & SURGICAL HISTORY:  Squamous cell carcinoma  [place] (hard of hearing)  uses hearing aid  Arthritis  Deep vein thrombosis (DVT)  Right leg 2016 ? unknown cause  Left carpal tunnel syndrome  H/O inguinal hernia repair  2017 right  H/O colonoscopy  H/O squamous cell carcinoma excision  Bilateral ear 2018  S/P cataract surgery  2009 bilateral  S/P foot surgery, right  1968  S/P arthroscopy of right shoulder  2019  History of carpal tunnel surgery of right wrist  2019    MEDICATIONS  (STANDING):  atorvastatin 80 milliGRAM(s) Oral at bedtime  buDESOnide    Inhalation Suspension 0.5 milliGRAM(s) Inhalation daily  famotidine    Tablet 20 milliGRAM(s) Oral two times a day  heparin  Infusion.  Unit(s)/Hr (7.5 mL/Hr) IV Continuous <Continuous>  metoprolol tartrate 12.5 milliGRAM(s) Oral every 12 hours    MEDICATIONS  (PRN):  albuterol    0.083% 2.5 milliGRAM(s) Nebulizer two times a day PRN Shortness of Breath and/or Wheezing  heparin   Injectable 3800 Unit(s) IV Push every 6 hours PRN For aPTT less than 40  nitroglycerin     SubLingual 0.4 milliGRAM(s) SubLingual every 5 minutes PRN Chest Pain    Vital Signs Last 24 Hrs  T(C): 36.7 (24 Sep 2023 00:32), Max: 36.7 (24 Sep 2023 00:32)  T(F): 98.1 (24 Sep 2023 00:32), Max: 98.1 (24 Sep 2023 00:32)  HR: 69 (24 Sep 2023 00:32) (69 - 106)  BP: 101/62 (24 Sep 2023 00:32) (101/62 - 126/74)  BP(mean): 67 (23 Sep 2023 15:18) (67 - 67)  RR: 18 (24 Sep 2023 00:32) (18 - 26)  SpO2: 98% (24 Sep 2023 00:32) (95% - 100%)    EKG: SR @ 91. Nl axis, intervals. Poor R wave progression. No dynamic ST changes.    TELEMETRY: SR    CARDIAC TESTS: none    RADIOLOGY & ADDITIONAL STUDIES: < from: CT Angio Chest PE Protocol w/ IV Cont (23 @ 11:43) >  IMPRESSION: No pulmonary embolus is noted.  Marked decrease in the left upper lobe consolidation when compared to   previous exam.    ASSESSMENT & PLAN:
Patient is a 88y old  Male who presents with a chief complaint of chest pain (24 Sep 2023 08:03)      HPI:  HPI:  88M w/PMH ILD/COPD/asthma/chronic resp failure on 2LNC continuous at home (lifetime nonsmoker), HLD, presents c/o left sided chest pain that began earlier today when he was outside placing a pump in his yard in anticipation of flooding.  He endorses that he suddenly became sob above baseline  and left CP, radiating to his left shoulder/arm. He went inside, called him EMS, was instructed to take 4x asa 81mg, which he did and then BIBEMS for further eval.  He denies any prior similar episode, no h/o MI/CAD.  His cp resolved after >1 hr but continues to be sob at rest and gets dyspneic w/ conversation.  He denies any associated palpitations, no recent n/v/d, abd pain, f/c, dysuria.      In ED, trop 390, bnp 3k, Cardio, Dr Ren called per ED and recommended to start hep gtt  CTA  chest No pulmonary embolus is noted.  Marked decrease in the left upper lobe consolidation when compared to previous exam.    PAST MEDICAL & SURGICAL HISTORY:  Squamous cell carcinoma  [place] (hard of hearing) uses hearing aid  Arthritis  Deep vein thrombosis (DVT) Right leg 2016 ? unknown cause  Left carpal tunnel syndrome  H/O inguinal hernia repair 2017 right  H/O colonoscopy 2014  H/O squamous cell carcinoma excision Bilateral ear 2018  S/P cataract surgery 2009 bilateral  S/P foot surgery, right 1968  S/P arthroscopy of right shoulder 2019  History of carpal tunnel surgery of right wrist 2019      Social History:   never smoked  +etoh: scotch on   lives w/ wife, ind ADLs      FAMILY HISTORY:  FH: bladder cancer father, FHx: uterine cancer sister, Mother  of pneumonia    MEDICATIONS  (STANDING):  atorvastatin 80 milliGRAM(s) Oral at bedtime  buDESOnide    Inhalation Suspension 0.5 milliGRAM(s) Inhalation daily  famotidine    Tablet 20 milliGRAM(s) Oral two times a day  heparin  Infusion.  Unit(s)/Hr (7.5 mL/Hr) IV Continuous <Continuous>  metoprolol tartrate 12.5 milliGRAM(s) Oral every 12 hours    MEDICATIONS  (PRN):  heparin   Injectable 3800 Unit(s) IV Push every 6 hours PRN For aPTT less than 40  nitroglycerin     SubLingual 0.4 milliGRAM(s) SubLingual every 5 minutes PRN Chest Pain                  Consultant(s) Notes Reviewed:      Care Discussed with Consultants/Other Providers:   (23 Sep 2023 15:10)      PAST MEDICAL & SURGICAL HISTORY:  Squamous cell carcinoma      [place] (hard of hearing)  uses hearing aid      Arthritis      Deep vein thrombosis (DVT)  Right leg  ? unknown cause      Left carpal tunnel syndrome      H/O inguinal hernia repair  2017 right      H/O colonoscopy  2014      H/O squamous cell carcinoma excision  Bilateral ear 2018      S/P cataract surgery  2009 bilateral      S/P foot surgery, right  1968      S/P arthroscopy of right shoulder  2019      History of carpal tunnel surgery of right wrist  2019          PREVIOUS DIAGNOSTIC TESTING:      MEDICATIONS  (STANDING):  atorvastatin 80 milliGRAM(s) Oral at bedtime  buDESOnide    Inhalation Suspension 0.5 milliGRAM(s) Inhalation daily  clopidogrel Tablet 300 milliGRAM(s) Oral once  famotidine    Tablet 20 milliGRAM(s) Oral two times a day  heparin  Infusion.  Unit(s)/Hr (7.5 mL/Hr) IV Continuous <Continuous>  metoprolol tartrate 12.5 milliGRAM(s) Oral every 12 hours    MEDICATIONS  (PRN):  albuterol    0.083% 2.5 milliGRAM(s) Nebulizer two times a day PRN Shortness of Breath and/or Wheezing  heparin   Injectable 3800 Unit(s) IV Push every 6 hours PRN For aPTT less than 40  nitroglycerin     SubLingual 0.4 milliGRAM(s) SubLingual every 5 minutes PRN Chest Pain      FAMILY HISTORY:  FH: bladder cancer  father    FHx: uterine cancer  sister      Vital Signs Last 24 Hrs  T(C): 36.2 (24 Sep 2023 08:29), Max: 36.7 (24 Sep 2023 00:32)  T(F): 97.2 (24 Sep 2023 08:29), Max: 98.1 (24 Sep 2023 00:32)  HR: 71 (24 Sep 2023 08:29) (69 - 106)  BP: 111/58 (24 Sep 2023 08:29) (101/62 - 126/74)  BP(mean): 67 (23 Sep 2023 15:18) (67 - 67)  RR: 18 (24 Sep 2023 08:29) (18 - 26)  SpO2: 99% (24 Sep 2023 08:29) (95% - 100%)    Parameters below as of 24 Sep 2023 08:29  Patient On (Oxygen Delivery Method): nasal cannula  O2 Flow (L/min): 3      I&O's Summary    23 Sep 2023 07:01  -  24 Sep 2023 07:00  --------------------------------------------------------  IN: 0 mL / OUT: 875 mL / NET: -875 mL      PHYSICAL EXAM  General Appearance: cooperative, no acute distress,   HEENT: PERRL, conjunctiva clear, EOM's intact, non injected pharynx, no exudate, TM   normal  Neck: Supple, , no adenopathy, thyroid: not enlarged, no carotid bruit or JVD  Back: Symmetric, no  tenderness,no soft tissue tenderness  Lungs: Cbilateral lower lobe crackles, mild exp wheeze  Heart: Regular rate and rhythm, S1, S2 normal, no murmur, rub or gallop  Abdomen: Soft, non-tender, bowel sounds active , no hepatosplenomegaly  Extremities: no cyanosis or edema, no joint swelling  Skin: Skin color, texture normal, no rashes   Neurologic: Alert and oriented X3 , cranial nerves intact, sensory and motor normal,    ECG:    LABS:                          12.8   7.03  )-----------( 209      ( 23 Sep 2023 19:52 )             39.1     -    137  |  109<H>  |  25<H>  ----------------------------<  108<H>  4.1   |  24  |  1.02    Ca    8.4<L>      23 Sep 2023 11:55  Mg     2.2         TPro  6.1  /  Alb  2.9<L>  /  TBili  0.4  /  DBili  x   /  AST  20  /  ALT  26  /  AlkPhos  104  09-23            PT/INR - ( 23 Sep 2023 11:55 )   PT: 12.7 sec;   INR: 1.13 ratio         PTT - ( 24 Sep 2023 03:04 )  PTT:45.4 sec  Urinalysis Basic - ( 23 Sep 2023 13:05 )    Color: Yellow / Appearance: Clear / S.005 / pH: x  Gluc: x / Ketone: Negative  / Bili: Negative / Urobili: Negative   Blood: x / Protein: 15 / Nitrite: Negative   Leuk Esterase: Negative / RBC: 0-2 /HPF / WBC 0-2 /HPF   Sq Epi: x / Non Sq Epi: x / Bacteria: Negative            RADIOLOGY & ADDITIONAL STUDIES:    RADIOLOGY & ADDITIONAL TESTS:    Imaging Personally Reviewed:  CTA  chest No pulmonary embolus is noted.  Marked decrease in the left upper lobe consolidation when compared to previous exam.  < from: CT Angio Chest PE Protocol w/ IV Cont (23 @ 11:43) >      INTERPRETATION:  Clinical information: Chest pain and shortness of   breath. Evaluate for pulmonary embolus. Exam is compared toprevious   study of 2023.    CT angiogram of the chest was obtained following administration of   intravenous contrast. Approximately 70 cc of Omnipaque 350 was   administered and 30 cc was discarded. Coronal, sagittal and MIP images   were submitted for review.    Few small lymph nodes are present in the pretracheal space and the AP   window.    Heart is normal in size. Calcification of the coronary arteries is noted.   No pericardial effusion is noted. Pulmonary arteries are normal in   caliber. No filling defects are noted.    No endobronchial lesions are noted. Compared to the previous examination,   the consolidation in the left upper lobe has markedly decreased. Residual   patchy areas of groundglass opacities are still noted in the left upper   lobe. Few patchy groundglass opacities noted in the right upper lobe are   unchanged when compared to previous exam. Minimal atelectasis is noted in   the posterior dependent aspects of both lower lobes. Small left pleural   effusion isnoted.    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 Low-attenuation lesion in the left   kidney is incompletely imaged on this exam.    Degenerative changes of the spine are noted.    IMPRESSION: No pulmonary embolus is noted.    Marked decrease in the left upper lobe consolidation when compared to   previous exam.    < end of copied text >    EKG Personally Reviewed:  no ST changes

## 2023-09-24 NOTE — PROVIDER CONTACT NOTE (OTHER) - ACTION/TREATMENT ORDERED:
Discussed with PA. Pt already on hep gtt, cardio already consulted, pt asymptomatic. No new interventions at this time.

## 2023-09-25 NOTE — DISCHARGE NOTE PROVIDER - HOSPITAL COURSE
88 M w/PMH ILD/COPD/asthma/chronic resp failure on 2LNC continuous at home (lifetime nonsmoker), HLD, presents c/o left sided chest pain that began earlier today when he was outside placing a pump in his yard in anticipation of flooding.  He endorses that he suddenly became sob above baseline  and left CP, radiating to his left shoulder/arm. He went inside, called him EMS, was instructed to take 4x asa 81mg, which he did and then BIBEMS for further eval.  He denies any prior similar episode, no h/o MI/CAD.  His cp resolved after >1 hr but continues to be sob at rest and gets dyspneic w/ conversation.  He denies any associated palpitations, no recent n/v/d, abd pain, f/c, dysuria.    -found to have a NSTEMI, startred on DAPT, Heparin IV    9/24: no cp, no sob  9.25: s/p LHC, triple vessel disease identified ,no cp      REVIEW OF SYSTEMS:    CONSTITUTIONAL: No weakness, No fevers or chills  ENT: No ear ache, No sorethroat  NECK: No pain, No stiffness  RESPIRATORY: No cough, No wheezing, No hemoptysis; No dyspnea  CARDIOVASCULAR: No chest pain, No palpitations  GASTROINTESTINAL: No abd pain, No nausea, No vomiting, No hematemesis, No diarrhea or constipation. No melena, No hematochezia.  GENITOURINARY: No dysuria, No  hematuria  NEUROLOGICAL: No diplopia, No paresthesia, No motor dysfunction  MUSCULOSKELETAL: No arthralgia, No myalgia  SKIN: No rashes, or lesions   PSYCH: no anxiety, no suicidal ideation    All other review of systems is negative unless indicated above    Vital Signs Last 24 Hrs  T(C): 36.5 (25 Sep 2023 08:08), Max: 36.5 (24 Sep 2023 15:39)  T(F): 97.7 (25 Sep 2023 08:08), Max: 97.7 (24 Sep 2023 15:39)  HR: 88 (25 Sep 2023 12:40) (69 - 89)  BP: 128/72 (25 Sep 2023 12:40) (100/58 - 128/72)  BP(mean): --  RR: 18 (25 Sep 2023 12:40) (16 - 18)  SpO2: 95% (25 Sep 2023 12:40) (94% - 100%)    Parameters below as of 25 Sep 2023 12:40  Patient On (Oxygen Delivery Method): room air        PHYSICAL EXAM:    GENERAL: NAD  HEENT:  NC/AT, EOMI, PERRLA, No scleral icterus, Moist mucous membranes  NECK: Supple, No JVD  CNS:  Alert & Oriented X3, Motor Strength 5/5 B/L upper and lower extremities; DTRs 2+ intact   LUNG: Normal Breath sounds, +bilat rales, No rhonchi, No wheezing  HEART: RRR; No murmurs, No rubs  ABDOMEN: +BS, ST/ND/NT  GENITOURINARY: Voiding, Bladder not distended  EXTREMITIES:  2+ Peripheral Pulses, No clubbing, No cyanosis, No tibial edema  MUSCULOSKELTAL: Joints normal ROM, No TTP, No effusion  SKIN: no rashes  RECTAL: deferred, not indicated  BREAST: deferred    a/p:    1. NSTEMI:  ASA/Plavix/BB/IV Heparin  LHC showed triple vessel disease: will transfer per cardiology to [facility]    2. ILD:  stable   c/w Budesonide

## 2023-09-25 NOTE — DIETITIAN NUTRITION RISK NOTIFICATION - UPON NUTRITIONAL ASSESSMENT BY THE REGISTERED DIETITIAN YOUR PATIENT WAS DETERMINED TO MEET CRITERIA/HAS EVIDENCE OF THE FOLLOWING DIAGNOSIS:
Severe protein-calorie malnutrition Bilobed Flap Text: The defect edges were debeveled with a #15 scalpel blade.  Given the location of the defect and the proximity to free margins a bilobe flap was deemed most appropriate.  Using a sterile surgical marker, an appropriate bilobe flap drawn around the defect.    The area thus outlined was incised deep to adipose tissue with a #15 scalpel blade.  The skin margins were undermined to an appropriate distance in all directions utilizing iris scissors.

## 2023-09-25 NOTE — PROGRESS NOTE ADULT - SUBJECTIVE AND OBJECTIVE BOX
Nurse Practitioner Progress note:   HPI:  88M w/PMH ILD/COPD/asthma/chronic resp failure on 2LNC continuous at home (lifetime nonsmoker), HLD, presents c/o left sided chest pain that began earlier today when he was outside placing a pump in his yard in anticipation of flooding.  He endorses that he suddenly became sob above baseline  and left CP, radiating to his left shoulder/arm. He went inside, called him EMS, was instructed to take 4x asa 81mg, which he did and then BIBEMS for further eval.  He denies any prior similar episode, no h/o MI/CAD.  His cp resolved after >1 hr but continues to be sob at rest and gets dyspneic w/ conversation.  He denies any associated palpitations, no recent n/v/d, abd pain, f/c, dysuria.    In ED, trop 390, bnp 3k, Cardio, Dr Ren called per ED and recommended to start hep gtt  CTA  chest No pulmonary embolus is noted.  Marked decrease in the left upper lobe consolidation when compared to previous exam.    23  88M w/PMH ILD/COPD/asthma/chronic resp failure on 2LNC continuous at home (lifetime nonsmoker), HLD, presents to ED with c/o left sided chest pain found to be NSTEMI. EKG with no acute ST or T wave changes, Troponin peak at 1038.86.   Pt brought to cath lab for further ischemic evaluation.       MEDICATIONS  (STANDING):  atorvastatin 80 milliGRAM(s) Oral at bedtime  buDESOnide    Inhalation Suspension 0.5 milliGRAM(s) Inhalation daily  famotidine    Tablet 20 milliGRAM(s) Oral two times a day  heparin  Infusion.  Unit(s)/Hr (7.5 mL/Hr) IV Continuous <Continuous>  metoprolol tartrate 12.5 milliGRAM(s) Oral every 12 hours    MEDICATIONS  (PRN):  heparin   Injectable 3800 Unit(s) IV Push every 6 hours PRN For aPTT less than 40  nitroglycerin     SubLingual 0.4 milliGRAM(s) SubLingual every 5 minutes PRN Chest Pain          T(C): 36.5 (23 @ 08:08), Max: 36.5 (23 @ 15:39)  HR: 83 (23 @ 10:50) (69 - 89)  BP: 121/71 (23 @ 10:50) (100/58 - 121/71)  RR: 16 (23 @ 11:35) (16 - 18)  SpO2: 95% (23 @ 10:50) (94% - 100%)  Wt(kg): --      LABS:                        11.5   7.74  )-----------( 173      ( 23 Sep 2023 11:55 )             35.1         137  |  109<H>  |  25<H>  ----------------------------<  108<H>  4.1   |  24  |  1.02    Ca    8.4<L>      23 Sep 2023 11:55    TPro  6.1  /  Alb  2.9<L>  /  TBili  0.4  /  DBili  x   /  AST  20  /  ALT  26  /  AlkPhos  104      PT/INR - ( 23 Sep 2023 11:55 )   PT: 12.7 sec;   INR: 1.13 ratio         PTT - ( 23 Sep 2023 11:55 )  PTT:31.8 sec      Urinalysis Basic - ( 23 Sep 2023 13:05 )    Color: Yellow / Appearance: Clear / S.005 / pH: x  Gluc: x / Ketone: Negative  / Bili: Negative / Urobili: Negative   Blood: x / Protein: 15 / Nitrite: Negative   Leuk Esterase: Negative / RBC: 0-2 /HPF / WBC 0-2 /HPF   Sq Epi: x / Non Sq Epi: x / Bacteria: Negative        RADIOLOGY & ADDITIONAL TESTS:    Imaging Personally Reviewed:  CTA  chest No pulmonary embolus is noted.  Marked decrease in the left upper lobe consolidation when compared to previous exam.    EKG Personally Reviewed:  no ST changes  Consultant(s) Notes Reviewed:      Care Discussed with Consultants/Other Providers:   (23 Sep 2023 15:10)      PHYSICAL EXAM:  NEURO: Non-focal, AxOx3.  No neuro deficits NECK: Supple, No JVD, No LAD  CHEST/LUNG: Clear to auscultation bilaterally; No wheeze  HEART: s1 s2 Regular rate and rhythm; No murmurs, rubs, or gallops  ABDOMEN: Soft, Nontender, Nondistended; Bowel sounds present X 4 quadrants   EXTREMITIES:  2+ Peripheral Pulses, No clubbing, cyanosis, or edema   VASCULAR: Peripheral pulses palpable 2+ bilaterally  PROCEDURE SITE: RFA pulled post procedure  ,Site is without hematoma or bleeding. Sensation and VIANCA intact. Distal pulses palpable 2+, capillary refill < 2 seconds. Patient denies pain, numbness, tingling, CP or SOB. Clean dry dressing applied     PROCEDURE RESULTS: Full report to follow     88M w/PMH ILD/COPD/asthma/chronic resp failure on 2LNC continuous at home (lifetime nonsmoker), HLD, presents to ED with c/o left sided chest pain found to be NSTEMI. EKG with no acute ST or T wave changes, Troponin peak at 1038.86.   Pt brought to cath lab for further ischemic evaluation.     S/P LHC revealing multiple blockages.     PLAN:  -Transfer on 23 to [facility] accepted by [facility]   -MISSY post hydration NS @124cc/hr x 4hrs  -Restart Heparin gtts @ 8.5ml/hr (as previous) at 15:00  -Post cath instructions reviewed, patient verbalizes and understands instructions  - plan discussed with Dr Maier, patient and Dr. Vera  -transfer initiated with [facility] @11:17 23 spoke with Nik

## 2023-09-25 NOTE — DISCHARGE NOTE PROVIDER - NSDCMRMEDTOKEN_GEN_ALL_CORE_FT
albuterol 2.5 mg/3 mL (0.083%) inhalation solution: 3 milliliter(s) by nebulizer once a day ***1st***  aspirin 81 mg oral delayed release tablet: 1 tab(s) orally once a day  atorvastatin 80 mg oral tablet: 1 tab(s) orally once a day (at bedtime)  famotidine 20 mg oral tablet: 1 tab(s) orally 2 times a day  heparin 100 units/mL-D5% intravenous solution: 8.5 milliliter(s) intravenous every 1 to 2 hours  metoprolol tartrate 25 mg oral tablet: 12.5 milligram(s) orally 2 times a day

## 2023-09-25 NOTE — DIETITIAN INITIAL EVALUATION ADULT - PERTINENT MEDS FT
MEDICATIONS  (STANDING):  aspirin enteric coated 81 milliGRAM(s) Oral daily  atorvastatin 80 milliGRAM(s) Oral at bedtime  buDESOnide    Inhalation Suspension 0.5 milliGRAM(s) Inhalation daily  famotidine    Tablet 20 milliGRAM(s) Oral two times a day  heparin  Infusion. 850 Unit(s)/Hr (8.5 mL/Hr) IV Continuous <Continuous>  metoprolol tartrate 12.5 milliGRAM(s) Oral every 12 hours  sodium chloride 0.9%. 1000 milliLiter(s) (128 mL/Hr) IV Continuous <Continuous>    MEDICATIONS  (PRN):  albuterol    0.083% 2.5 milliGRAM(s) Nebulizer two times a day PRN Shortness of Breath and/or Wheezing  heparin   Injectable 3800 Unit(s) IV Push every 6 hours PRN For aPTT less than 40  nitroglycerin     SubLingual 0.4 milliGRAM(s) SubLingual every 5 minutes PRN Chest Pain

## 2023-09-25 NOTE — PROGRESS NOTE ADULT - ASSESSMENT
A/P: 88M w/PMH ILD/COPD/asthma/chronic resp failure on 2LNC continuous at home (lifetime nonsmoker), HLD, presents c/o left sided chest pain.    1. Unstable angina. +troponins, CP. Cont IV heparin, asa. Will add plavix 300mg x 1 now.  Cont statin, low dose Bbl. BP/HR stable.   Keep NPO after MN for C tomorrow to assess coronary anatomy.  2Decho pending.  Monitor on tele.   Currently CP free, SR- stable for tele.     2. COPD/ILD. Stable at present. Cont outpt regimen, cont O2.    3. HTN. BP stable at present. Cont low dose Bbl.     4. DVT proph.  A/P: 88M w/PMH ILD/COPD/asthma/chronic resp failure on 2LNC continuous at home (lifetime nonsmoker), HLD, presents c/o left sided chest pain.    1. Unstable angina. +troponins, CP. Cont IV heparin, asa. Will add plavix 300mg x 1 now.  Cont statin, low dose Bbl. BP/HR stable.   Plan for [facility] today   2Decho pending.  Monitor on tele.   Currently CP free, SR- stable for tele.     2. COPD/ILD. Stable at present. Cont outpt regimen, cont O2.    3. HTN. BP stable at present. Cont low dose Bbl.     4. DVT proph.       Case d/w Dr. Maier

## 2023-09-25 NOTE — DIETITIAN INITIAL EVALUATION ADULT - NSFNSGIIOFT_GEN_A_CORE
I&O's Detail    25 Sep 2023 07:01  -  25 Sep 2023 14:54  --------------------------------------------------------  IN:    Heparin Infusion: 8.5 mL  Total IN: 8.5 mL    OUT:  Total OUT: 0 mL    Total NET: 8.5 mL

## 2023-09-25 NOTE — CHART NOTE - NSCHARTNOTEFT_GEN_A_CORE
88M w/PMH ILD/COPD/asthma/chronic resp failure on 2LNC continuous at home (lifetime nonsmoker), HLD, presents to ED with c/o left sided chest pain found to be NSTEMI. EKG with no acute ST or T wave changes, Troponin peak at 1038.86.   Pt brought to cath lab for further ischemic evaluation.     ASA class: III  Cr: 1.02  GFR: 71  Bleeding risk: 2.5%   Aleksander score: 5 points     - MISSY protocol pre hydration: NS 250cc IV bolus x1   - Procedure, its risks, alternatives, benefits and potential complications were discussed in detail. Risks include but not limited to bleeding, infection, allergy, renal failure requiring dialysis, stroke, vascular injury, pericardial tamponade, arrhythmias, MI and even death. Pt is agreeable and has consented for cardiac catheterization with possible stent placement and possible sedation and/ or analgesia.
RN called to report critical troponin: 1038.   Pt is resting comfortably, no acute c/o, no chest pain, dizziness, sob at present. Pt is admitted with NSTEMI, on ASA, Heparin gtt, BB, Lipitor, cardio was consulted by ED.     D/w Dr. Espinoza, continue to monitor.     Daytime Hospitalist to f/u in AM.

## 2023-09-25 NOTE — PROGRESS NOTE ADULT - SUBJECTIVE AND OBJECTIVE BOX
Cardiology Consultation    HPI: 88M w/PMH ILD/COPD/asthma/chronic resp failure on 2LNC continuous at home (lifetime nonsmoker), HLD, presents c/o left sided chest pain that began earlier today when he was outside placing a pump in his yard in anticipation of flooding.  He endorses that he suddenly became sob above baseline  and left CP, radiating to his left shoulder/arm. He went inside, called him EMS, was instructed to take 4x asa 81mg, which he did and then BIBEMS for further eval.  He denies any prior similar episode, no h/o MI/CAD.  His cp resolved after >1 hr but continues to be sob at rest and gets dyspneic w/ conversation.  He denies any associated palpitations, no recent n/v/d, abd pain, f/c, dysuria.    CTA  chest No pulmonary embolus is noted.  Marked decrease in the left upper lobe consolidation when compared to previous exam.    . Pt seen/examined on 3N. H/o CP with exertion worrsening over last month. Yesterday pt had CP/LUE pain, SOB when moving a pump.  No dizziness/syncope. Went to ER then- +troponins.  No h/o CAD.    .    PAST MEDICAL & SURGICAL HISTORY:  Squamous cell carcinoma  [place] (hard of hearing) uses hearing aid  Arthritis  Deep vein thrombosis (DVT) Right leg  ? unknown cause  Left carpal tunnel syndrome  H/O inguinal hernia repair 2017 right  H/O colonoscopy 2014  H/O squamous cell carcinoma excision Bilateral ear 2018  S/P cataract surgery 2009 bilateral  S/P foot surgery, right 1968  S/P arthroscopy of right shoulder 2019  History of carpal tunnel surgery of right wrist 2019    Social History:   never smoked  +etoh: scotch on   lives w/ wife, ind ADLs    FAMILY HISTORY:  FH: bladder cancer father, FHx: uterine cancer sister, Mother  of pneumonia    MEDICATIONS  (STANDING):  aspirin enteric coated 81 milliGRAM(s) Oral daily  atorvastatin 80 milliGRAM(s) Oral at bedtime  buDESOnide    Inhalation Suspension 0.5 milliGRAM(s) Inhalation daily  famotidine    Tablet 20 milliGRAM(s) Oral two times a day  heparin  Infusion.  Unit(s)/Hr (7.5 mL/Hr) IV Continuous <Continuous>  metoprolol tartrate 12.5 milliGRAM(s) Oral every 12 hours  sodium chloride 0.9% Bolus 250 milliLiter(s) IV Bolus once    MEDICATIONS  (PRN):  albuterol    0.083% 2.5 milliGRAM(s) Nebulizer two times a day PRN Shortness of Breath and/or Wheezing  heparin   Injectable 3800 Unit(s) IV Push every 6 hours PRN For aPTT less than 40  nitroglycerin     SubLingual 0.4 milliGRAM(s) SubLingual every 5 minutes PRN Chest Pain    PHYSICAL EXAM:  T(C): 36.5 (25 Sep 2023 08:08), Max: 36.5 (24 Sep 2023 15:39)  T(F): 97.7 (25 Sep 2023 08:08), Max: 97.7 (24 Sep 2023 15:39)  HR: 89 (25 Sep 2023 08:08) (69 - 89)  BP: 120/72 (25 Sep 2023 08:08) (100/58 - 120/72)  RR: 16 (25 Sep 2023 08:08) (16 - 18)  SpO2: 94% (25 Sep 2023 08:08) (94% - 100%)    Parameters below as of 25 Sep 2023 08:08  Patient On (Oxygen Delivery Method): room air    GENERAL: NAD, mildly dyspneic w/ conversation  HEAD:  Atraumatic, Normocephalic  EYES: EOMI, PERRLA, conjunctiva and sclera clear  ENT: normal hearing, no nasal discharge, throat clear, dentition normal  NECK: Supple, No JVD, no LAD, no thyromegaly   CHEST/LUNG: RT base crackles (chronic); No wheeze, respirations mildly labored   HEART: Regular rate and rhythm; No murmurs, rubs, or gallops  ABDOMEN: Soft, Nontender, Nondistended; Bowel sounds present, no HSM  EXTREMITIES:  2+ Peripheral Pulses, No clubbing, cyanosis, +trace b/l pitting edema (chronic)               LABS: All Labs Reviewed:                        13.1   7.66  )-----------( 188      ( 25 Sep 2023 07:51 )             40.0     09-23    137  |  109<H>  |  25<H>  ----------------------------<  108<H>  4.1   |  24  |  1.02    Ca    8.4<L>      23 Sep 2023 11:55  Mg     2.4         TPro  6.1  /  Alb  2.9<L>  /  TBili  0.4  /  DBili  x   /  AST  20  /  ALT  26  /  AlkPhos  104      PT/INR - ( 23 Sep 2023 11:55 )   PT: 12.7 sec;   INR: 1.13 ratio       PTT - ( 25 Sep 2023 07:51 )  PTT:66.5 sec    Troponin: 392.77 ng/L, Troponin: 612.59 ng/L, Troponin: 1038.86 ng/L        EKG: SR @ 91. Nl axis, intervals. Poor R wave progression. No dynamic ST changes.    TELEMETRY: SR    CARDIAC TESTS: none    RADIOLOGY & ADDITIONAL STUDIES: < from: CT Angio Chest PE Protocol w/ IV Cont (23 @ 11:43) >  IMPRESSION: No pulmonary embolus is noted.  Marked decrease in the left upper lobe consolidation when compared to   previous exam. Cardiology Consultation    HPI: 88M w/PMH ILD/COPD/asthma/chronic resp failure on 2LNC continuous at home (lifetime nonsmoker), HLD, presents c/o left sided chest pain that began earlier today when he was outside placing a pump in his yard in anticipation of flooding.  He endorses that he suddenly became sob above baseline  and left CP, radiating to his left shoulder/arm. He went inside, called him EMS, was instructed to take 4x asa 81mg, which he did and then BIBEMS for further eval.  He denies any prior similar episode, no h/o MI/CAD.  His cp resolved after >1 hr but continues to be sob at rest and gets dyspneic w/ conversation.  He denies any associated palpitations, no recent n/v/d, abd pain, f/c, dysuria.    CTA  chest No pulmonary embolus is noted.  Marked decrease in the left upper lobe consolidation when compared to previous exam.    . Pt seen/examined on 3N. H/o CP with exertion worrsening over last month. Yesterday pt had CP/LUE pain, SOB when moving a pump.  No dizziness/syncope. Went to ER then- +troponins.  No h/o CAD.    . Seen in Cath lab, no events on tele overnight. Awaiting [facility]    PAST MEDICAL & SURGICAL HISTORY:  Squamous cell carcinoma  [place] (hard of hearing) uses hearing aid  Arthritis  Deep vein thrombosis (DVT) Right leg  ? unknown cause  Left carpal tunnel syndrome  H/O inguinal hernia repair 2017 right  H/O colonoscopy 2014  H/O squamous cell carcinoma excision Bilateral ear 2018  S/P cataract surgery 2009 bilateral  S/P foot surgery, right 1968  S/P arthroscopy of right shoulder 2019  History of carpal tunnel surgery of right wrist 2019    Social History:   never smoked  +etoh: scotch on   lives w/ wife, ind ADLs    FAMILY HISTORY:  FH: bladder cancer father, FHx: uterine cancer sister, Mother  of pneumonia    MEDICATIONS  (STANDING):  aspirin enteric coated 81 milliGRAM(s) Oral daily  atorvastatin 80 milliGRAM(s) Oral at bedtime  buDESOnide    Inhalation Suspension 0.5 milliGRAM(s) Inhalation daily  famotidine    Tablet 20 milliGRAM(s) Oral two times a day  heparin  Infusion.  Unit(s)/Hr (7.5 mL/Hr) IV Continuous <Continuous>  metoprolol tartrate 12.5 milliGRAM(s) Oral every 12 hours  sodium chloride 0.9% Bolus 250 milliLiter(s) IV Bolus once    MEDICATIONS  (PRN):  albuterol    0.083% 2.5 milliGRAM(s) Nebulizer two times a day PRN Shortness of Breath and/or Wheezing  heparin   Injectable 3800 Unit(s) IV Push every 6 hours PRN For aPTT less than 40  nitroglycerin     SubLingual 0.4 milliGRAM(s) SubLingual every 5 minutes PRN Chest Pain    PHYSICAL EXAM:  T(C): 36.5 (25 Sep 2023 08:08), Max: 36.5 (24 Sep 2023 15:39)  T(F): 97.7 (25 Sep 2023 08:08), Max: 97.7 (24 Sep 2023 15:39)  HR: 89 (25 Sep 2023 08:08) (69 - 89)  BP: 120/72 (25 Sep 2023 08:08) (100/58 - 120/72)  RR: 16 (25 Sep 2023 08:08) (16 - 18)  SpO2: 94% (25 Sep 2023 08:08) (94% - 100%)    Parameters below as of 25 Sep 2023 08:08  Patient On (Oxygen Delivery Method): room air    GENERAL: NAD, mildly dyspneic w/ conversation  HEAD:  Atraumatic, Normocephalic  EYES: EOMI, PERRLA, conjunctiva and sclera clear  ENT: normal hearing, no nasal discharge, throat clear, dentition normal  NECK: Supple, No JVD, no LAD, no thyromegaly   CHEST/LUNG: RT base crackles (chronic); No wheeze, respirations mildly labored   HEART: Regular rate and rhythm; No murmurs, rubs, or gallops  ABDOMEN: Soft, Nontender, Nondistended; Bowel sounds present, no HSM  EXTREMITIES:  2+ Peripheral Pulses, No clubbing, cyanosis, +trace b/l pitting edema (chronic)               LABS: All Labs Reviewed:                        13.1   7.66  )-----------( 188      ( 25 Sep 2023 07:51 )             40.0     09-23    137  |  109<H>  |  25<H>  ----------------------------<  108<H>  4.1   |  24  |  1.02    Ca    8.4<L>      23 Sep 2023 11:55  Mg     2.4         TPro  6.1  /  Alb  2.9<L>  /  TBili  0.4  /  DBili  x   /  AST  20  /  ALT  26  /  AlkPhos  104      PT/INR - ( 23 Sep 2023 11:55 )   PT: 12.7 sec;   INR: 1.13 ratio       PTT - ( 25 Sep 2023 07:51 )  PTT:66.5 sec    Troponin: 392.77 ng/L, Troponin: 612.59 ng/L, Troponin: 1038.86 ng/L      EKG: SR @ 91. Nl axis, intervals. Poor R wave progression. No dynamic ST changes.    TELEMETRY: SR    CARDIAC TESTS: none    RADIOLOGY & ADDITIONAL STUDIES: < from: CT Angio Chest PE Protocol w/ IV Cont (23 @ 11:43) >  IMPRESSION: No pulmonary embolus is noted.  Marked decrease in the left upper lobe consolidation when compared to   previous exam.

## 2023-09-25 NOTE — H&P CARDIOLOGY - NSICDXPASTMEDICALHX_GEN_ALL_CORE_FT
PAST MEDICAL HISTORY:  Arthritis     Deep vein thrombosis (DVT) Right leg 2016 ? unknown cause    [place] (hard of hearing) uses hearing aid    Left carpal tunnel syndrome     Squamous cell carcinoma      PAST MEDICAL HISTORY:  Arthritis     Deep vein thrombosis (DVT) Right leg 2016 ? unknown cause    DVT (deep venous thrombosis)     [place] (hard of hearing) uses hearing aid    Left carpal tunnel syndrome     Squamous cell carcinoma

## 2023-09-25 NOTE — DIETITIAN INITIAL EVALUATION ADULT - ORAL INTAKE PTA/DIET HISTORY
Pt lives at home w/ wife - wife cooks/grocery shops w/o difficulty. Reports good appetite, consumes 3 small meals/day, likely consuming <75% of ENN chronically 2/2 chest pain, SOB. Wears upper dentures - no issues chewing.

## 2023-09-25 NOTE — PACU DISCHARGE NOTE - COMMENTS
Report given to 3 [place] RN- No Acute distress at this time- Denies CP - cardiac Monitor in place- Safety maintained

## 2023-09-25 NOTE — DIETITIAN NUTRITION RISK NOTIFICATION - ADDITIONAL COMMENTS/DIETITIAN RECOMMENDATIONS
1) Liberalize diet to LOW sodium only to maximize caloric and nutrient intake; c/w 1500mL FR as per MD  2) Encourage protein-rich foods, maximize food preferences  3) Monitor bowel movements, if no BM for >3 days, consider implementing bowel regimen.  4) MVI w/ minerals daily to ensure 100% RDA met  5) Consider adding thiamine 100 mg daily 2/2 poor PO intake/ malnutrition  6) Monitor lytes/ min and replete prn.  7) Confirm goals of care regarding nutrition support  RD will continue to monitor PO intake, labs, hydration, and wt prn.

## 2023-09-25 NOTE — DISCHARGE NOTE PROVIDER - NSDCCPCAREPLAN_GEN_ALL_CORE_FT
PRINCIPAL DISCHARGE DIAGNOSIS  Diagnosis: Non-ST elevation MI (NSTEMI)  Assessment and Plan of Treatment:

## 2023-09-25 NOTE — DIETITIAN INITIAL EVALUATION ADULT - NSICDXPASTMEDICALHX_GEN_ALL_CORE_FT
PAST MEDICAL HISTORY:  Arthritis     Deep vein thrombosis (DVT) Right leg 2016 ? unknown cause    [place] (hard of hearing) uses hearing aid    Left carpal tunnel syndrome     Squamous cell carcinoma

## 2023-09-25 NOTE — DIETITIAN NUTRITION RISK NOTIFICATION - TREATMENT: THE FOLLOWING DIET HAS BEEN RECOMMENDED
Diet, DASH/TLC:   Sodium & Cholesterol Restricted  1500mL Fluid Restriction ([id number])     Special Instructions for Nursin milliLiter(s) to 2000 milliLiter(s) fluid restriction (23 @ 17:35) [Active]

## 2023-09-25 NOTE — PATIENT PROFILE ADULT - FALL HARM RISK - HARM RISK INTERVENTIONS

## 2023-09-25 NOTE — H&P CARDIOLOGY - PSYCHIATRIC
From: Justine Manzano Sr.  To: John Kee  Sent: 4/15/2023 11:39 AM CDT  Subject: Blood work     Hi Dr. Kee,  Could you please explain my blood work. Should I be worried about my Glucose (diabetes).  Thank you and Have a Blessed weekend.    Affect and characteristics of appearance, verbalizations, behaviors are appropriate

## 2023-09-25 NOTE — DIETITIAN INITIAL EVALUATION ADULT - SIGNS/SYMPTOMS
AEB mod-severe muscle/fat wasting AEB mod-severe muscle/fat wasting, consuming <75% of ENN chronically

## 2023-09-25 NOTE — DIETITIAN INITIAL EVALUATION ADULT - OTHER INFO
88M w/PMH ILD/COPD/asthma/chronic resp failure on 2LNC continuous at home (lifetime nonsmoker), HLD, presents c/o left sided chest pain that began earlier today when he was outside placing a pump in his yard in anticipation of flooding.  He endorses that he suddenly became sob above baseline  and left CP, radiating to his left shoulder/arm.   Admit for NSTEMI, unstable angina, w/ chronic resp failure/COPD    Reports good appetite since admit (x 2 days). Reports UBW of 142# x 1 year - endorses that weight has been stable. Bed scale wt of 142# taken by RD on 9/25/23 - Unable to identify any pertinent wt changes at this time. NFPE reveals moderate-severe muscle/ fat wasting - pt appears thin, frail, and yovany. Liberalize diet to regular to maximize caloric and nutrient intake 2/2 only w/ mild edema; c/w 1500mL FR as per MD. Pt refuses ALL nutr supps at this time; encourage protein-rich foods, maximize food preferences. See below for other recommendations.

## 2023-09-25 NOTE — DISCHARGE NOTE NURSING/CASE MANAGEMENT/SOCIAL WORK - PATIENT PORTAL LINK FT
You can access the FollowMyHealth Patient Portal offered by [facility] by registering at the following website: [email or web address]. By joining [facility]’s FollowMyHealth portal, you will also be able to view your health information using other applications (apps) compatible with our system.

## 2023-09-25 NOTE — DIETITIAN NUTRITION RISK NOTIFICATION - PHYSICAL ASSESSMENT TEMPLES
severe
Patient will be able to perform functional transfers, using least restrictive device, independently within 2 weeks.

## 2023-09-25 NOTE — H&P CARDIOLOGY - HISTORY OF PRESENT ILLNESS
88M w/PMH ILD/COPD/asthma/chronic resp failure on 2LNC continuous at home (lifetime nonsmoker), HLD, presents c/o left sided chest pain that began 9/23 (on day of admission to [facility]) when he was outside placing a pump in his yard in anticipation of flooding.  He endorses that he suddenly became sob above baseline and developed left sided CP, radiating to his left shoulder/arm. He went inside, called EMS, was instructed to take 4x asa 81mg, which he did and then BIBEMS to [facility] for further eval.  He denies any prior similar episode, no h/o MI/CAD.  His cp resolved after >1 hr but continued to be sob at rest and gets dyspneic w/ conversation.  He denies any associated palpitations, no recent n/v/d, abd pain, f/c, dysuria.      CTA  chest No pulmonary embolus is noted.  Marked decrease in the left upper lobe consolidation when compared to previous exam.  DX of NSTEMI, w Troponin peak 1038.86. started on DAPT, Heparin IV    9/24: no cp, no sob  9/25: s/p LHC, triple vessel disease identified.   For transfer today to [facility] for intervention  88M w/PMH ILD/COPD/asthma/chronic resp failure on 2LNC continuous at home (lifetime nonsmoker), HLD, remote DVT, presents c/o left sided chest pain that began 9/23 (on day of admission to [facility]) when he was outside placing a pump in his yard in anticipation of flooding.  He endorses that he suddenly became sob above baseline and developed left sided CP, radiating to his left shoulder/arm. He went inside, called EMS, was instructed to take 4x asa 81mg, which he did and then BIBEMS to [facility] for further eval.  He denies any prior similar episode, no h/o MI/CAD.  His cp resolved after >1 hr but continued to be sob at rest and gets dyspneic w/ conversation.  He denies any associated palpitations, no recent n/v/d, abd pain, f/c, dysuria.      CTA  chest No pulmonary embolus is noted.  Marked decrease in the left upper lobe consolidation when compared to previous exam.  DX of NSTEMI, w Troponin peak 1038.86. started on DAPT, Heparin IV    9/24: no cp, no sob  9/25: s/p LHC, triple vessel disease identified.   For transfer today to [facility] for intervention  88M w/PMH ILD/COPD/asthma/chronic resp failure on 2LNC continuous at home (lifetime nonsmoker), HLD, remote DVT, presents c/o left sided chest pain that began 9/23 (on day of admission to [facility]) when he was outside placing a pump in his yard in anticipation of flooding.  He endorses that he suddenly became sob above baseline and developed left sided CP, radiating to his left shoulder/arm. He went inside, called EMS, was instructed to take 4x asa 81mg, which he did and then BIBEMS to [facility] for further eval.  He denies any prior similar episode, no h/o MI/CAD.  His cp resolved after >1 hr but continued to be sob at rest and gets dyspneic w/ conversation.  He denies any associated palpitations, no recent n/v/d, abd pain, f/c, dysuria.      CTA  chest No pulmonary embolus is noted.  Marked decrease in the left upper lobe consolidation when compared to previous exam.  DX of NSTEMI, w Troponin peak 1038.86. started on DAPT, Heparin IV    9/24: no cp, no sob  9/25: s/p LHC, triple vessel disease identified.   For transfer today to [facility] for intervention. Pain free

## 2023-09-26 NOTE — DISCHARGE NOTE PROVIDER - HOSPITAL COURSE
HPI:  88M w/PMH ILD/COPD/asthma/chronic resp failure on 2LNC continuous at home (lifetime nonsmoker), HLD, remote DVT, presents c/o left sided chest pain that began 9/23 (on day of admission to [facility]) when he was outside placing a pump in his yard in anticipation of flooding.  He endorses that he suddenly became sob above baseline and developed left sided CP, radiating to his left shoulder/arm. He went inside, called EMS, was instructed to take 4x asa 81mg, which he did and then BIBEMS to [facility] for further eval.  He denies any prior similar episode, no h/o MI/CAD.  His cp resolved after >1 hr but continued to be sob at rest and gets dyspneic w/ conversation.  He denies any associated palpitations, no recent n/v/d, abd pain, f/c, dysuria.      CTA  chest No pulmonary embolus is noted.  Marked decrease in the left upper lobe consolidation when compared to previous exam.  DX of NSTEMI, w Troponin peak 1038.86. started on DAPT, Heparin IV    9/24: no cp, no sob  9/25: s/p LHC, triple vessel disease identified.   For transfer today to [facility] for intervention. Pain free (25 Sep 2023 16:28)   HPI:  88M w/PMH ILD/COPD/asthma/chronic resp failure on 2LNC continuous at home (lifetime nonsmoker), HLD, remote DVT, presents c/o left sided chest pain that began 9/23 (on day of admission to [facility]) when he was outside placing a pump in his yard in anticipation of flooding.  He endorses that he suddenly became sob above baseline and developed left sided CP, radiating to his left shoulder/arm. He went inside, called EMS, was instructed to take 4x asa 81mg, which he did and then BIBEMS to [facility] for further eval.  He denies any prior similar episode, no h/o MI/CAD.  His cp resolved after >1 hr but continued to be sob at rest and gets dyspneic w/ conversation.  He denies any associated palpitations, no recent n/v/d, abd pain, f/c, dysuria.      CTA  chest No pulmonary embolus is noted.  Marked decrease in the left upper lobe consolidation when compared to previous exam.  DX of NSTEMI, w Troponin peak 1038.86. started on DAPT, Heparin IV    9/24: no cp, no sob  9/25: s/p LHC, triple vessel disease identified.   For transfer today to [facility] for intervention. Pain free (25 Sep 2023 16:28)    9/26 cardiac cath with one stent to the mid LAD, one stent to the ostial circ. Right radial artery access site without swelling, bleeding.   HPI:  88M w/PMH ILD/COPD/asthma/chronic resp failure on 2LNC continuous at home (lifetime nonsmoker), HLD, remote DVT, presents c/o left sided chest pain that began 9/23 (on day of admission to [facility]) when he was outside placing a pump in his yard in anticipation of flooding.  He endorses that he suddenly became sob above baseline and developed left sided CP, radiating to his left shoulder/arm. He went inside, called EMS, was instructed to take 4x asa 81mg, which he did and then BIBEMS to [facility] for further eval.  He denies any prior similar episode, no h/o MI/CAD.  His cp resolved after >1 hr but continued to be sob at rest and gets dyspneic w/ conversation.  He denies any associated palpitations, no recent n/v/d, abd pain, f/c, dysuria.      CTA  chest No pulmonary embolus is noted.  Marked decrease in the left upper lobe consolidation when compared to previous exam.  DX of NSTEMI, w Troponin peak 1038.86. started on DAPT, Heparin IV    9/24: no cp, no sob  9/25: s/p LHC, triple vessel disease identified.   For transfer today to [facility] for intervention. Pain free (25 Sep 2023 16:28)    9/26 cardiac cath with one stent to the mid LAD, one stent to the ostial circ. Right radial artery access site without swelling, bleeding.    Physical therapy eval - no needs   9/27 stable for discharge to home

## 2023-09-26 NOTE — PHYSICAL THERAPY INITIAL EVALUATION ADULT - ADDITIONAL COMMENTS
Patient lives in private home with spouse, 2 steps to enter, 1 flight to bedroom, patient independent with ADLs/IADLs, no DME, +drives

## 2023-09-26 NOTE — PHYSICAL THERAPY INITIAL EVALUATION ADULT - PERTINENT HX OF CURRENT PROBLEM, REHAB EVAL
88M w/PMH ILD/COPD/asthma/chronic resp failure on 2LNC continuous at home (lifetime nonsmoker), HLD, remote DVT, presents c/o left sided chest pain that began 9/23 (on day of admission to [facility]) when he was outside placing a pump in his yard in anticipation of flooding.  He endorses that he suddenly became sob above baseline and developed left sided CP, radiating to his left shoulder/arm. He went inside, called EMS, was instructed to take 4x asa 81mg, which he did and then BIBEMS to [facility] for further eval.  He denies any prior similar episode, no h/o MI/CAD.  His cp resolved after >1 hr but continued to be SOB at rest and gets dyspneic w/ conversation. DX of NSTEMI, w Troponin peak 1038.86. started on DAPT, Heparin IV  9/25: s/p [facility], triple vessel disease identified.   Transferred [facility] for intervention

## 2023-09-26 NOTE — DISCHARGE NOTE PROVIDER - CARE PROVIDER_API CALL
Mary Kate Alvarez  Cardiovascular Disease  [address] [place] [address]  Phone: [phone]  Fax: [phone]  Scheduled Appointment: 10/24/2023 01:30 PM

## 2023-09-26 NOTE — DISCHARGE NOTE PROVIDER - NSDCCPTREATMENT_GEN_ALL_CORE_FT
PRINCIPAL PROCEDURE  Procedure: Left heart catheterization  Findings and Treatment:       SECONDARY PROCEDURE  Procedure: Transthoracic echo  Findings and Treatment:      PRINCIPAL PROCEDURE  Procedure: Placement of coronary artery stent  Findings and Treatment: Stents placed to the mid LAD and ostial circ. Continue DAPT with aspirin and Plavix.      SECONDARY PROCEDURE  Procedure: Transthoracic echo  Findings and Treatment: TTE EF 30-35%

## 2023-09-26 NOTE — DISCHARGE NOTE PROVIDER - DISCHARGE SERVICE FOR PATIENT
on the discharge service for the patient. I have reviewed and made amendments to the documentation where necessary. Epidermal Autograft Text: The defect edges were debeveled with a #15 scalpel blade.  Given the location of the defect, shape of the defect and the proximity to free margins an epidermal autograft was deemed most appropriate.  Using a sterile surgical marker, the primary defect shape was transferred to the donor site. The epidermal graft was then harvested.  The skin graft was then placed in the primary defect and oriented appropriately.

## 2023-09-26 NOTE — DISCHARGE NOTE PROVIDER - NSDCCPCAREPLAN_GEN_ALL_CORE_FT
PRINCIPAL DISCHARGE DIAGNOSIS  Diagnosis: NSTEMI (non-ST elevated myocardial infarction)  Assessment and Plan of Treatment:      PRINCIPAL DISCHARGE DIAGNOSIS  Diagnosis: NSTEMI (non-ST elevated myocardial infarction)  Assessment and Plan of Treatment: Do not stop your aspirin or Plavix unless instructed to do so by your cardiologist, they help keep your stented arteries open.   No heavy lifting or pushing/pulling with procedure arm for 2 weeks. No driving for 2 days. You may shower 24 hours following the procedure but avoid baths/swimming for 1 week. Check your wrist site for bleeding and/or swelling daily following procedure and call your doctor immediately if it occurs or if you experience increased pain at the site. Follow up with your cardiologist in 1-2 weeks. You may call [facility] if you have any questions/concerns regarding your procedure [phone].      SECONDARY DISCHARGE DIAGNOSES  Diagnosis: HTN (hypertension)  Assessment and Plan of Treatment: Continue with your blood pressure medications; eat a heart healthy diet with low salt diet; exercise regularly (consult with your physician or cardiologist first); maintain a heart healthy weight; if you smoke - quit (A resource to help you stop smoking is the [facility] [employer] – phone number [phone].); include healthy ways to manage stress. Continue to follow with your primary care physician or cardiologist.    Diagnosis: HLD (hyperlipidemia)  Assessment and Plan of Treatment: Continue with your cholesterol medications. Eat a heart healthy diet that is low in saturated fats and salt, and includes whole grains, fruits, vegetables and lean protein; exercise regularly (consult with your physician or cardiologist first); maintain a heart healthy weight; if you smoke - quit (A resource to help you stop smoking is the [facility] [employer] for Tobacco Control – phone number [phone].). Continue to follow with your primary physician or cardiologist.

## 2023-09-26 NOTE — PHYSICAL THERAPY INITIAL EVALUATION ADULT - PLANNED THERAPY INTERVENTIONS, PT EVAL
stair negotiation GOAL: Patient will negotiate up / down 1 flight of stairs independently in 2 weeks/balance training/bed mobility training/gait training/transfer training

## 2023-09-26 NOTE — DISCHARGE NOTE PROVIDER - NSDCMRMEDTOKEN_GEN_ALL_CORE_FT
albuterol 2.5 mg/3 mL (0.083%) inhalation solution: 3 milliliter(s) by nebulizer once a day ***1st***  aspirin 81 mg oral delayed release tablet: 1 tab(s) orally once a day  atorvastatin 10 mg oral tablet: 1 tab(s) orally once a day   albuterol 2.5 mg/3 mL (0.083%) inhalation solution: 3 milliliter(s) by nebulizer once a day ***1st***  aspirin 81 mg oral delayed release tablet: 1 tab(s) orally once a day  atorvastatin 10 mg oral tablet: 1 tab(s) orally once a day  clopidogrel 75 mg oral tablet: 1 tab(s) orally once a day  metoprolol succinate 25 mg oral tablet, extended release: 0.5 tab(s) orally every 12 hours  pantoprazole 40 mg oral delayed release tablet: 1 tab(s) orally once a day (before a meal)

## 2023-09-27 NOTE — PROGRESS NOTE ADULT - SUBJECTIVE AND OBJECTIVE BOX
HPI:  88M w/PMH ILD/COPD/asthma/chronic resp failure on 2LNC continuous at home (lifetime nonsmoker), HLD, remote DVT, presents c/o left sided chest pain that began 9/23 (on day of admission to [facility]) when he was outside placing a pump in his yard in anticipation of flooding.  He endorses that he suddenly became sob above baseline and developed left sided CP, radiating to his left shoulder/arm. He went inside, called EMS, was instructed to take 4x asa 81mg, which he did and then BIBEMS to [facility] for further eval.  He denies any prior similar episode, no h/o MI/CAD.  His cp resolved after >1 hr but continued to be sob at rest and gets dyspneic w/ conversation.  He denies any associated palpitations, no recent n/v/d, abd pain, f/c, dysuria.      CTA  chest No pulmonary embolus is noted.  Marked decrease in the left upper lobe consolidation when compared to previous exam.  DX of NSTEMI, w Troponin peak 1038.86. started on DAPT, Heparin IV    9/24: no cp, no sob  9/25: s/p LHC, triple vessel disease identified.   For transfer today to [facility] for intervention. Pain free (25 Sep 2023 16:28)    Patient is a 88y old  Male who presents with a chief complaint of NSTEMI (26 Sep 2023 09:32)          Allergies    No Known Allergies    Intolerances        Medications:  albuterol    0.083% 2.5 milliGRAM(s) Nebulizer every 6 hours PRN  aspirin enteric coated 81 milliGRAM(s) Oral daily  atorvastatin 80 milliGRAM(s) Oral at bedtime  clopidogrel Tablet 75 milliGRAM(s) Oral daily  metoprolol tartrate 12.5 milliGRAM(s) Oral every 12 hours  pantoprazole    Tablet 40 milliGRAM(s) Oral before breakfast      Vitals:  T(C): 36.7 (09-27-23 @ 00:06), Max: 36.7 (09-27-23 @ 00:06)  HR: 66 (09-27-23 @ 00:06) (63 - 88)  BP: 94/51 (09-27-23 @ 00:06) (89/43 - 113/51)  BP(mean): 61 (09-27-23 @ 00:06) (55 - 70)  RR: 17 (09-27-23 @ 00:06) (12 - 17)  SpO2: 99% (09-27-23 @ 00:06) (92% - 100%)  Wt(kg): --  Daily     Daily   I&O's Summary    25 Sep 2023 07:01  -  26 Sep 2023 07:00  --------------------------------------------------------  IN: 240 mL / OUT: 850 mL / NET: -610 mL    26 Sep 2023 07:01  -  27 Sep 2023 01:57  --------------------------------------------------------  IN: 420 mL / OUT: 700 mL / NET: -280 mL          Physical Exam:  Appearance: Normal  Eyes: PERRL, EOMI  HENT: Normal oral muscosa, NC/AT  Cardiovascular: S1S2, RRR, No M/R/G, no JVD, No Lower extremity edema  Procedural Access Site: No hematoma, Non-tender to palpation, 2+ pulse, No bruit, No Ecchymosis  Respiratory: Clear to auscultation bilaterally  Gastrointestinal: Soft, Non tender, Normal Bowel Sounds  Musculoskeletal: No clubbing, No joint deformity   Neurologic: Non-focal  Lymphatic: No lymphadenopathy  Psychiatry: AAOx3, Mood & affect appropriate  Skin: No rashes, No ecchymoses, No cyanosis    09-25    138  |  106  |  23  ----------------------------<  122<H>  4.2   |  22  |  1.14    Ca    8.6      25 Sep 2023 23:15  Mg     2.4     09-25      PTT - ( 26 Sep 2023 06:35 )  PTT:62.6 sec        Lipid panel   Hgb A1c                         11.6   6.28  )-----------( 170      ( 26 Sep 2023 06:35 )             36.2         ECG: SR 64 bpm  
[facility] INVASIVE CARDIOLOGY- (Farhana Saravia, Igor, Itz, Nestor, Diana, Rishi, Antonio, Serge)   CARDIAC CSSU, ACP TEAM   [phone]      CHIEF COMPLAINT: Patient is a 88y old  Male who presents with a chief complaint of   HPI:  88M w/PMH ILD/COPD/asthma/chronic resp failure on 2LNC continuous at home (lifetime nonsmoker), HLD, remote DVT, presents c/o left sided chest pain that began 9/23 (on day of admission to [facility]) when he was outside placing a pump in his yard in anticipation of flooding.  He endorses that he suddenly became sob above baseline and developed left sided CP, radiating to his left shoulder/arm. He went inside, called EMS, was instructed to take 4x asa 81mg, which he did and then BIBEMS to [facility] for further eval.  He denies any prior similar episode, no h/o MI/CAD.  His cp resolved after >1 hr but continued to be sob at rest and gets dyspneic w/ conversation.  He denies any associated palpitations, no recent n/v/d, abd pain, f/c, dysuria.      CTA  chest No pulmonary embolus is noted.  Marked decrease in the left upper lobe consolidation when compared to previous exam.  DX of NSTEMI, w Troponin peak 1038.86. started on DAPT, Heparin IV    9/24: no cp, no sob  9/25: s/p LHC, triple vessel disease identified.   For transfer today to [facility] for intervention. Pain free (25 Sep 2023 16:28)      Subjective/Observations:   Patient feels well - no current complaints- Denies chest pain, SOB, palpitation, N/V  Occasional chest tightness, but much less intensity compared to the one he had on Friday.   No chest pain at this time.   No pain, numbness, tingling or swelling around the access site    MEDICATIONS  (STANDING):  aspirin enteric coated 81 milliGRAM(s) Oral daily  atorvastatin 80 milliGRAM(s) Oral at bedtime  clopidogrel Tablet 75 milliGRAM(s) Oral daily  heparin  Infusion. 850 Unit(s)/Hr (8.5 mL/Hr) IV Continuous <Continuous>  metoprolol tartrate 12.5 milliGRAM(s) Oral every 12 hours  pantoprazole    Tablet 40 milliGRAM(s) Oral before breakfast    MEDICATIONS  (PRN):  albuterol    0.083% 2.5 milliGRAM(s) Nebulizer every 6 hours PRN Wheezing  heparin   Injectable 3800 Unit(s) IV Push every 6 hours PRN For aPTT less than 40      Allergies    No Known Allergies    Intolerances        Vital Signs Last 24 Hrs  T(C): 36.7 (25 Sep 2023 20:06), Max: 36.7 (25 Sep 2023 20:06)  T(F): 98 (25 Sep 2023 20:06), Max: 98 (25 Sep 2023 20:06)  HR: 74 (25 Sep 2023 20:06) (67 - 89)  BP: 99/55 (25 Sep 2023 20:06) (99/55 - 128/72)  BP(mean): 65 (25 Sep 2023 20:06) (65 - 81)  RR: 17 (25 Sep 2023 20:06) (16 - 18)  SpO2: 97% (25 Sep 2023 20:06) (94% - 98%)    Parameters below as of 25 Sep 2023 20:06  Patient On (Oxygen Delivery Method): nasal cannula  O2 Flow (L/min): 2      FOCUSED PHYSICAL EXAM:  Cardiovascular: Regular, S1 and S2, No murmurs, rubs, gallops or clicks  Pulmonary: Non-labored, breath sounds are clear bilaterally, No wheezing, rales or rhonchi  cath site: Right groin access site - stable without edema, bleeding or hematoma. soft, + pulses     LABS: All Labs Reviewed:                        11.5   5.70  )-----------( 182      ( 25 Sep 2023 23:15 )             35.3                         13.1   7.66  )-----------( 188      ( 25 Sep 2023 07:51 )             40.0                         12.8   6.47  )-----------( 205      ( 24 Sep 2023 09:12 )             38.7     25 Sep 2023 23:15    138    |  106    |  23     ----------------------------<  122    4.2     |  22     |  1.14   23 Sep 2023 11:55    137    |  109    |  25     ----------------------------<  108    4.1     |  24     |  1.02     Ca    8.6        25 Sep 2023 23:15  Ca    8.4        23 Sep 2023 11:55  Mg     2.4       25 Sep 2023 07:51  Mg     2.2       24 Sep 2023 09:12  Mg     2.2       23 Sep 2023 19:52    TPro  6.1    /  Alb  2.9    /  TBili  0.4    /  DBili  x      /  AST  20     /  ALT  26     /  AlkPhos  104    23 Sep 2023 11:55    PTT - ( 25 Sep 2023 23:15 )  PTT:51.6 sec

## 2023-09-27 NOTE — DISCHARGE NOTE NURSING/CASE MANAGEMENT/SOCIAL WORK - PATIENT PORTAL LINK FT
You can access the FollowMyHealth Patient Portal offered by [facility] by registering at the following website: [email or web address]. By joining [facility]’s FollowMyHealth portal, you will also be able to view your health information using other applications (apps) compatible with our system.

## 2023-09-27 NOTE — DISCHARGE NOTE NURSING/CASE MANAGEMENT/SOCIAL WORK - NSDCFUADDAPPT_GEN_ALL_CORE_FT
CARDIOLOGY FOLLOW UP APPOINTMENT: [facility] @1:30PM on 10/24/23 with SHEYLA Kraus and Dr. Mary Kate Alvarez

## 2023-09-27 NOTE — PROGRESS NOTE ADULT - ASSESSMENT
87 y/o male with above pmhx, cardiac cath with stents to the mid LAD and ostial circ on 9/26. TTE with EF 30-35% on 9/26.

## 2023-09-27 NOTE — DISCHARGE NOTE NURSING/CASE MANAGEMENT/SOCIAL WORK - NSDCPEFALRISK_GEN_ALL_CORE
For information on Fall & Injury Prevention, visit: [email or web address] OR  [email or web address] OR  https://www.cdc.gov/steadi/patient.html

## 2023-10-16 NOTE — ED PROVIDER NOTE - NS ED MD DISPO SPECIAL CONSIDERATION1
[facility] Cardiology   Comprehensive Heart Failure Clinic    Please send a Heart Failure Assessment today 10/16/2023. Please report if patient has any:    -shortness of breath  -paroxysmal nocturnal dyspnea  -dyspnea on exertion  -cough  -dizziness or lightheadedness  -syncope  -edema, in lower extremities  -if abdomen is feeling full or patient has complaints of early satiety or bloating    Please also send:    -current medication list; please note if patient is refusing any medications  -most recent weights for the past 7 days  -blood pressure and heart rate for the past 7 days    Please fax to [phone].      After information is received any new orders or changes will be faxed back to your facility.    Thank you,    Becca GRANADO  [facility]  Comprehensive Heart Failure Clinic  [facility] [address]  P: [phone]  F: [phone]          None

## 2023-11-06 PROBLEM — I82.409 ACUTE EMBOLISM AND THROMBOSIS OF UNSPECIFIED DEEP VEINS OF UNSPECIFIED LOWER EXTREMITY: Chronic | Status: ACTIVE | Noted: 2023-01-01

## 2023-11-06 PROBLEM — D69.2 SENILE PURPURA: Status: ACTIVE | Noted: 2023-01-01

## 2023-11-06 PROBLEM — L82.1 SEBORRHEIC KERATOSES: Status: ACTIVE | Noted: 2017-07-03

## 2023-11-06 PROBLEM — D22.9 MULTIPLE BENIGN MELANOCYTIC NEVI: Status: ACTIVE | Noted: 2021-11-03

## 2023-11-06 PROBLEM — L81.4 LENTIGINES: Status: ACTIVE | Noted: 2020-10-07

## 2023-12-11 PROBLEM — U07.1 COVID-19 VIRUS INFECTION: Status: ACTIVE | Noted: 2023-01-01

## 2024-01-01 ENCOUNTER — TRANSCRIPTION ENCOUNTER (OUTPATIENT)
Age: 89
End: 2024-01-01

## 2024-01-01 ENCOUNTER — INPATIENT (INPATIENT)
Facility: HOSPITAL | Age: 89
LOS: 1 days | DRG: 871 | End: 2024-02-28
Attending: FAMILY MEDICINE | Admitting: INTERNAL MEDICINE
Payer: MEDICARE

## 2024-01-01 ENCOUNTER — INPATIENT (INPATIENT)
Facility: HOSPITAL | Age: 89
LOS: 3 days | Discharge: HOME HEALTH | DRG: 196 | End: 2024-02-08
Attending: HOSPITALIST | Admitting: INTERNAL MEDICINE
Payer: MEDICARE

## 2024-01-01 VITALS
DIASTOLIC BLOOD PRESSURE: 46 MMHG | OXYGEN SATURATION: 93 % | RESPIRATION RATE: 18 BRPM | HEART RATE: 109 BPM | WEIGHT: 130.07 LBS | SYSTOLIC BLOOD PRESSURE: 96 MMHG | HEIGHT: 64 IN | TEMPERATURE: 98 F

## 2024-01-01 VITALS
HEIGHT: 64 IN | SYSTOLIC BLOOD PRESSURE: 122 MMHG | WEIGHT: 138.01 LBS | DIASTOLIC BLOOD PRESSURE: 58 MMHG | HEART RATE: 90 BPM | RESPIRATION RATE: 24 BRPM | OXYGEN SATURATION: 94 % | TEMPERATURE: 98 F

## 2024-01-01 VITALS — OXYGEN SATURATION: 100 %

## 2024-01-01 VITALS — HEART RATE: 33 BPM

## 2024-01-01 DIAGNOSIS — Z98.890 OTHER SPECIFIED POSTPROCEDURAL STATES: Chronic | ICD-10-CM

## 2024-01-01 DIAGNOSIS — N17.9 ACUTE KIDNEY FAILURE, UNSPECIFIED: ICD-10-CM

## 2024-01-01 DIAGNOSIS — I27.20 PULMONARY HYPERTENSION, UNSPECIFIED: ICD-10-CM

## 2024-01-01 DIAGNOSIS — I25.10 ATHEROSCLEROTIC HEART DISEASE OF NATIVE CORONARY ARTERY WITHOUT ANGINA PECTORIS: ICD-10-CM

## 2024-01-01 DIAGNOSIS — I25.2 OLD MYOCARDIAL INFARCTION: ICD-10-CM

## 2024-01-01 DIAGNOSIS — Z98.49 CATARACT EXTRACTION STATUS, UNSPECIFIED EYE: Chronic | ICD-10-CM

## 2024-01-01 DIAGNOSIS — J96.21 ACUTE AND CHRONIC RESPIRATORY FAILURE WITH HYPOXIA: ICD-10-CM

## 2024-01-01 DIAGNOSIS — M19.90 UNSPECIFIED OSTEOARTHRITIS, UNSPECIFIED SITE: ICD-10-CM

## 2024-01-01 DIAGNOSIS — I34.0 NONRHEUMATIC MITRAL (VALVE) INSUFFICIENCY: ICD-10-CM

## 2024-01-01 DIAGNOSIS — E43 UNSPECIFIED SEVERE PROTEIN-CALORIE MALNUTRITION: ICD-10-CM

## 2024-01-01 DIAGNOSIS — R06.09 OTHER FORMS OF DYSPNEA: ICD-10-CM

## 2024-01-01 DIAGNOSIS — J84.9 INTERSTITIAL PULMONARY DISEASE, UNSPECIFIED: ICD-10-CM

## 2024-01-01 DIAGNOSIS — H91.90 UNSPECIFIED HEARING LOSS, UNSPECIFIED EAR: ICD-10-CM

## 2024-01-01 DIAGNOSIS — Z86.718 PERSONAL HISTORY OF OTHER VENOUS THROMBOSIS AND EMBOLISM: ICD-10-CM

## 2024-01-01 DIAGNOSIS — E78.5 HYPERLIPIDEMIA, UNSPECIFIED: ICD-10-CM

## 2024-01-01 DIAGNOSIS — I50.23 ACUTE ON CHRONIC SYSTOLIC (CONGESTIVE) HEART FAILURE: ICD-10-CM

## 2024-01-01 DIAGNOSIS — Z95.5 PRESENCE OF CORONARY ANGIOPLASTY IMPLANT AND GRAFT: ICD-10-CM

## 2024-01-01 DIAGNOSIS — I11.0 HYPERTENSIVE HEART DISEASE WITH HEART FAILURE: ICD-10-CM

## 2024-01-01 DIAGNOSIS — Z85.828 PERSONAL HISTORY OF OTHER MALIGNANT NEOPLASM OF SKIN: ICD-10-CM

## 2024-01-01 DIAGNOSIS — J84.10 PULMONARY FIBROSIS, UNSPECIFIED: ICD-10-CM

## 2024-01-01 DIAGNOSIS — Z99.81 DEPENDENCE ON SUPPLEMENTAL OXYGEN: ICD-10-CM

## 2024-01-01 DIAGNOSIS — U07.1 COVID-19: ICD-10-CM

## 2024-01-01 LAB
A1C WITH ESTIMATED AVERAGE GLUCOSE RESULT: 6.4 % — HIGH (ref 4–5.6)
A1C WITH ESTIMATED AVERAGE GLUCOSE RESULT: 6.8 % — HIGH (ref 4–5.6)
ADD ON TEST-SPECIMEN IN LAB: SIGNIFICANT CHANGE UP
ALBUMIN SERPL ELPH-MCNC: 2.2 G/DL — LOW (ref 3.3–5)
ALBUMIN SERPL ELPH-MCNC: 2.3 G/DL — LOW (ref 3.3–5)
ALBUMIN SERPL ELPH-MCNC: 2.3 G/DL — LOW (ref 3.3–5)
ALBUMIN SERPL ELPH-MCNC: 2.7 G/DL — LOW (ref 3.3–5)
ALBUMIN SERPL ELPH-MCNC: 3 G/DL — LOW (ref 3.3–5)
ALBUMIN SERPL ELPH-MCNC: 3.2 G/DL — LOW (ref 3.3–5)
ALP SERPL-CCNC: 106 U/L — SIGNIFICANT CHANGE UP (ref 40–120)
ALP SERPL-CCNC: 110 U/L — SIGNIFICANT CHANGE UP (ref 40–120)
ALP SERPL-CCNC: 114 U/L — SIGNIFICANT CHANGE UP (ref 40–120)
ALP SERPL-CCNC: 133 U/L — HIGH (ref 40–120)
ALP SERPL-CCNC: 81 U/L — SIGNIFICANT CHANGE UP (ref 40–120)
ALP SERPL-CCNC: 89 U/L — SIGNIFICANT CHANGE UP (ref 40–120)
ALT FLD-CCNC: 19 U/L — SIGNIFICANT CHANGE UP (ref 12–78)
ALT FLD-CCNC: 28 U/L — SIGNIFICANT CHANGE UP (ref 12–78)
ALT FLD-CCNC: 28 U/L — SIGNIFICANT CHANGE UP (ref 12–78)
ALT FLD-CCNC: 32 U/L — SIGNIFICANT CHANGE UP (ref 12–78)
ANION GAP SERPL CALC-SCNC: 4 MMOL/L — LOW (ref 5–17)
ANION GAP SERPL CALC-SCNC: 5 MMOL/L — SIGNIFICANT CHANGE UP (ref 5–17)
ANION GAP SERPL CALC-SCNC: 6 MMOL/L — SIGNIFICANT CHANGE UP (ref 5–17)
ANION GAP SERPL CALC-SCNC: 7 MMOL/L — SIGNIFICANT CHANGE UP (ref 5–17)
ANION GAP SERPL CALC-SCNC: 7 MMOL/L — SIGNIFICANT CHANGE UP (ref 5–17)
ANION GAP SERPL CALC-SCNC: 8 MMOL/L — SIGNIFICANT CHANGE UP (ref 5–17)
ANION GAP SERPL CALC-SCNC: 9 MMOL/L — SIGNIFICANT CHANGE UP (ref 5–17)
APPEARANCE UR: ABNORMAL
AST SERPL-CCNC: 23 U/L — SIGNIFICANT CHANGE UP (ref 15–37)
AST SERPL-CCNC: 24 U/L — SIGNIFICANT CHANGE UP (ref 15–37)
AST SERPL-CCNC: 26 U/L — SIGNIFICANT CHANGE UP (ref 15–37)
AST SERPL-CCNC: 28 U/L — SIGNIFICANT CHANGE UP (ref 15–37)
AST SERPL-CCNC: 34 U/L — SIGNIFICANT CHANGE UP (ref 15–37)
AST SERPL-CCNC: 35 U/L — SIGNIFICANT CHANGE UP (ref 15–37)
BACTERIA # UR AUTO: NEGATIVE /HPF — SIGNIFICANT CHANGE UP
BASE EXCESS BLDA CALC-SCNC: -8.5 MMOL/L — LOW (ref -2–3)
BASE EXCESS BLDA CALC-SCNC: -8.5 MMOL/L — LOW (ref -2–3)
BASE EXCESS BLDV CALC-SCNC: -3.9 MMOL/L — LOW (ref -2–3)
BASOPHILS # BLD AUTO: 0 K/UL — SIGNIFICANT CHANGE UP (ref 0–0.2)
BASOPHILS # BLD AUTO: 0.01 K/UL — SIGNIFICANT CHANGE UP (ref 0–0.2)
BASOPHILS # BLD AUTO: 0.02 K/UL — SIGNIFICANT CHANGE UP (ref 0–0.2)
BASOPHILS # BLD AUTO: 0.03 K/UL — SIGNIFICANT CHANGE UP (ref 0–0.2)
BASOPHILS # BLD AUTO: 0.04 K/UL — SIGNIFICANT CHANGE UP (ref 0–0.2)
BASOPHILS NFR BLD AUTO: 0 % — SIGNIFICANT CHANGE UP (ref 0–2)
BASOPHILS NFR BLD AUTO: 0.1 % — SIGNIFICANT CHANGE UP (ref 0–2)
BASOPHILS NFR BLD AUTO: 0.2 % — SIGNIFICANT CHANGE UP (ref 0–2)
BASOPHILS NFR BLD AUTO: 0.5 % — SIGNIFICANT CHANGE UP (ref 0–2)
BASOPHILS NFR BLD AUTO: 0.5 % — SIGNIFICANT CHANGE UP (ref 0–2)
BILIRUB DIRECT SERPL-MCNC: 0.2 MG/DL — SIGNIFICANT CHANGE UP (ref 0–0.3)
BILIRUB INDIRECT FLD-MCNC: 0.7 MG/DL — SIGNIFICANT CHANGE UP (ref 0.2–1)
BILIRUB SERPL-MCNC: 0.6 MG/DL — SIGNIFICANT CHANGE UP (ref 0.2–1.2)
BILIRUB SERPL-MCNC: 0.7 MG/DL — SIGNIFICANT CHANGE UP (ref 0.2–1.2)
BILIRUB SERPL-MCNC: 0.9 MG/DL — SIGNIFICANT CHANGE UP (ref 0.2–1.2)
BILIRUB SERPL-MCNC: 1.1 MG/DL — SIGNIFICANT CHANGE UP (ref 0.2–1.2)
BILIRUB UR-MCNC: NEGATIVE — SIGNIFICANT CHANGE UP
BLOOD GAS COMMENTS ARTERIAL: SIGNIFICANT CHANGE UP
BLOOD GAS COMMENTS ARTERIAL: SIGNIFICANT CHANGE UP
BUN SERPL-MCNC: 34 MG/DL — HIGH (ref 7–23)
BUN SERPL-MCNC: 35 MG/DL — HIGH (ref 7–23)
BUN SERPL-MCNC: 54 MG/DL — HIGH (ref 7–23)
BUN SERPL-MCNC: 57 MG/DL — HIGH (ref 7–23)
BUN SERPL-MCNC: 60 MG/DL — HIGH (ref 7–23)
BUN SERPL-MCNC: 62 MG/DL — HIGH (ref 7–23)
BUN SERPL-MCNC: 71 MG/DL — HIGH (ref 7–23)
BUN SERPL-MCNC: 72 MG/DL — HIGH (ref 7–23)
BUN SERPL-MCNC: 82 MG/DL — HIGH (ref 7–23)
CALCIUM SERPL-MCNC: 8 MG/DL — LOW (ref 8.5–10.1)
CALCIUM SERPL-MCNC: 8.3 MG/DL — LOW (ref 8.5–10.1)
CALCIUM SERPL-MCNC: 8.4 MG/DL — LOW (ref 8.5–10.1)
CALCIUM SERPL-MCNC: 8.4 MG/DL — LOW (ref 8.5–10.1)
CALCIUM SERPL-MCNC: 8.6 MG/DL — SIGNIFICANT CHANGE UP (ref 8.5–10.1)
CALCIUM SERPL-MCNC: 8.6 MG/DL — SIGNIFICANT CHANGE UP (ref 8.5–10.1)
CALCIUM SERPL-MCNC: 8.8 MG/DL — SIGNIFICANT CHANGE UP (ref 8.5–10.1)
CALCIUM SERPL-MCNC: 8.9 MG/DL — SIGNIFICANT CHANGE UP (ref 8.5–10.1)
CALCIUM SERPL-MCNC: 9 MG/DL — SIGNIFICANT CHANGE UP (ref 8.5–10.1)
CAST: 3 /LPF — SIGNIFICANT CHANGE UP (ref 0–4)
CHLORIDE SERPL-SCNC: 107 MMOL/L — SIGNIFICANT CHANGE UP (ref 96–108)
CHLORIDE SERPL-SCNC: 108 MMOL/L — SIGNIFICANT CHANGE UP (ref 96–108)
CHLORIDE SERPL-SCNC: 110 MMOL/L — HIGH (ref 96–108)
CHLORIDE SERPL-SCNC: 110 MMOL/L — HIGH (ref 96–108)
CHLORIDE SERPL-SCNC: 114 MMOL/L — HIGH (ref 96–108)
CHLORIDE SERPL-SCNC: 114 MMOL/L — HIGH (ref 96–108)
CHLORIDE SERPL-SCNC: 117 MMOL/L — HIGH (ref 96–108)
CO2 SERPL-SCNC: 17 MMOL/L — LOW (ref 22–31)
CO2 SERPL-SCNC: 19 MMOL/L — LOW (ref 22–31)
CO2 SERPL-SCNC: 21 MMOL/L — LOW (ref 22–31)
CO2 SERPL-SCNC: 23 MMOL/L — SIGNIFICANT CHANGE UP (ref 22–31)
CO2 SERPL-SCNC: 24 MMOL/L — SIGNIFICANT CHANGE UP (ref 22–31)
CO2 SERPL-SCNC: 24 MMOL/L — SIGNIFICANT CHANGE UP (ref 22–31)
CO2 SERPL-SCNC: 25 MMOL/L — SIGNIFICANT CHANGE UP (ref 22–31)
COLOR SPEC: YELLOW — SIGNIFICANT CHANGE UP
CREAT SERPL-MCNC: 1.05 MG/DL — SIGNIFICANT CHANGE UP (ref 0.5–1.3)
CREAT SERPL-MCNC: 1.26 MG/DL — SIGNIFICANT CHANGE UP (ref 0.5–1.3)
CREAT SERPL-MCNC: 1.3 MG/DL — SIGNIFICANT CHANGE UP (ref 0.5–1.3)
CREAT SERPL-MCNC: 1.34 MG/DL — HIGH (ref 0.5–1.3)
CREAT SERPL-MCNC: 1.39 MG/DL — HIGH (ref 0.5–1.3)
CREAT SERPL-MCNC: 1.44 MG/DL — HIGH (ref 0.5–1.3)
CREAT SERPL-MCNC: 1.45 MG/DL — HIGH (ref 0.5–1.3)
CREAT SERPL-MCNC: 1.61 MG/DL — HIGH (ref 0.5–1.3)
CREAT SERPL-MCNC: 1.63 MG/DL — HIGH (ref 0.5–1.3)
CREAT SERPL-MCNC: 1.9 MG/DL — HIGH (ref 0.5–1.3)
CRP SERPL-MCNC: 177 MG/L — HIGH
CRP SERPL-MCNC: 91 MG/L — HIGH
CULTURE RESULTS: SIGNIFICANT CHANGE UP
D DIMER BLD IA.RAPID-MCNC: 4613 NG/ML DDU — HIGH
D DIMER BLD IA.RAPID-MCNC: 775 NG/ML DDU — HIGH
DIFF PNL FLD: ABNORMAL
EGFR: 34 ML/MIN/1.73M2 — LOW
EGFR: 41 ML/MIN/1.73M2 — LOW
EGFR: 41 ML/MIN/1.73M2 — LOW
EGFR: 47 ML/MIN/1.73M2 — LOW
EGFR: 47 ML/MIN/1.73M2 — LOW
EGFR: 49 ML/MIN/1.73M2 — LOW
EGFR: 51 ML/MIN/1.73M2 — LOW
EGFR: 53 ML/MIN/1.73M2 — LOW
EGFR: 55 ML/MIN/1.73M2 — LOW
EGFR: 69 ML/MIN/1.73M2 — SIGNIFICANT CHANGE UP
EOSINOPHIL # BLD AUTO: 0 K/UL — SIGNIFICANT CHANGE UP (ref 0–0.5)
EOSINOPHIL # BLD AUTO: 0 K/UL — SIGNIFICANT CHANGE UP (ref 0–0.5)
EOSINOPHIL # BLD AUTO: 0.02 K/UL — SIGNIFICANT CHANGE UP (ref 0–0.5)
EOSINOPHIL # BLD AUTO: 0.25 K/UL — SIGNIFICANT CHANGE UP (ref 0–0.5)
EOSINOPHIL # BLD AUTO: 0.34 K/UL — SIGNIFICANT CHANGE UP (ref 0–0.5)
EOSINOPHIL NFR BLD AUTO: 0 % — SIGNIFICANT CHANGE UP (ref 0–6)
EOSINOPHIL NFR BLD AUTO: 0 % — SIGNIFICANT CHANGE UP (ref 0–6)
EOSINOPHIL NFR BLD AUTO: 0.2 % — SIGNIFICANT CHANGE UP (ref 0–6)
EOSINOPHIL NFR BLD AUTO: 4.2 % — SIGNIFICANT CHANGE UP (ref 0–6)
EOSINOPHIL NFR BLD AUTO: 4.3 % — SIGNIFICANT CHANGE UP (ref 0–6)
ERYTHROCYTE [SEDIMENTATION RATE] IN BLOOD: 60 MM/HR — HIGH (ref 0–20)
ESTIMATED AVERAGE GLUCOSE: 137 MG/DL — HIGH (ref 68–114)
ESTIMATED AVERAGE GLUCOSE: 148 MG/DL — HIGH (ref 68–114)
FERRITIN SERPL-MCNC: 1410 NG/ML — HIGH (ref 30–400)
FERRITIN SERPL-MCNC: 1642 NG/ML — HIGH (ref 30–400)
FLUAV AG NPH QL: SIGNIFICANT CHANGE UP
FLUAV AG NPH QL: SIGNIFICANT CHANGE UP
FLUBV AG NPH QL: SIGNIFICANT CHANGE UP
FLUBV AG NPH QL: SIGNIFICANT CHANGE UP
GAS PNL BLDA: SIGNIFICANT CHANGE UP
GAS PNL BLDA: SIGNIFICANT CHANGE UP
GAS PNL BLDV: SIGNIFICANT CHANGE UP
GLUCOSE BLDC GLUCOMTR-MCNC: 143 MG/DL — HIGH (ref 70–99)
GLUCOSE BLDC GLUCOMTR-MCNC: 146 MG/DL — HIGH (ref 70–99)
GLUCOSE BLDC GLUCOMTR-MCNC: 154 MG/DL — HIGH (ref 70–99)
GLUCOSE BLDC GLUCOMTR-MCNC: 197 MG/DL — HIGH (ref 70–99)
GLUCOSE SERPL-MCNC: 115 MG/DL — HIGH (ref 70–99)
GLUCOSE SERPL-MCNC: 152 MG/DL — HIGH (ref 70–99)
GLUCOSE SERPL-MCNC: 155 MG/DL — HIGH (ref 70–99)
GLUCOSE SERPL-MCNC: 162 MG/DL — HIGH (ref 70–99)
GLUCOSE SERPL-MCNC: 163 MG/DL — HIGH (ref 70–99)
GLUCOSE SERPL-MCNC: 167 MG/DL — HIGH (ref 70–99)
GLUCOSE SERPL-MCNC: 170 MG/DL — HIGH (ref 70–99)
GLUCOSE SERPL-MCNC: 180 MG/DL — HIGH (ref 70–99)
GLUCOSE SERPL-MCNC: 198 MG/DL — HIGH (ref 70–99)
GLUCOSE UR QL: NEGATIVE MG/DL — SIGNIFICANT CHANGE UP
HCO3 BLDA-SCNC: 16 MMOL/L — LOW (ref 21–28)
HCO3 BLDA-SCNC: 17 MMOL/L — LOW (ref 21–28)
HCO3 BLDV-SCNC: 20 MMOL/L — LOW (ref 22–29)
HCT VFR BLD CALC: 29.6 % — LOW (ref 39–50)
HCT VFR BLD CALC: 29.8 % — LOW (ref 39–50)
HCT VFR BLD CALC: 30.6 % — LOW (ref 39–50)
HCT VFR BLD CALC: 33.5 % — LOW (ref 39–50)
HCT VFR BLD CALC: 37.2 % — LOW (ref 39–50)
HCT VFR BLD CALC: 37.2 % — LOW (ref 39–50)
HGB BLD-MCNC: 11.2 G/DL — LOW (ref 13–17)
HGB BLD-MCNC: 12 G/DL — LOW (ref 13–17)
HGB BLD-MCNC: 12.1 G/DL — LOW (ref 13–17)
HGB BLD-MCNC: 9.4 G/DL — LOW (ref 13–17)
HGB BLD-MCNC: 9.6 G/DL — LOW (ref 13–17)
HGB BLD-MCNC: 9.8 G/DL — LOW (ref 13–17)
IMM GRANULOCYTES NFR BLD AUTO: 0.3 % — SIGNIFICANT CHANGE UP (ref 0–0.9)
IMM GRANULOCYTES NFR BLD AUTO: 0.5 % — SIGNIFICANT CHANGE UP (ref 0–0.9)
IMM GRANULOCYTES NFR BLD AUTO: 0.6 % — SIGNIFICANT CHANGE UP (ref 0–0.9)
IMM GRANULOCYTES NFR BLD AUTO: 0.6 % — SIGNIFICANT CHANGE UP (ref 0–0.9)
IMM GRANULOCYTES NFR BLD AUTO: 1 % — HIGH (ref 0–0.9)
INR BLD: 1.28 RATIO — HIGH (ref 0.85–1.18)
INR BLD: 1.33 RATIO — HIGH (ref 0.85–1.18)
INR BLD: 1.44 RATIO — HIGH (ref 0.85–1.18)
KETONES UR-MCNC: ABNORMAL MG/DL
LACTATE SERPL-SCNC: 0.9 MMOL/L — SIGNIFICANT CHANGE UP (ref 0.7–2)
LACTATE SERPL-SCNC: 2 MMOL/L — SIGNIFICANT CHANGE UP (ref 0.7–2)
LEGIONELLA AG UR QL: NEGATIVE — SIGNIFICANT CHANGE UP
LEUKOCYTE ESTERASE UR-ACNC: NEGATIVE — SIGNIFICANT CHANGE UP
LYMPHOCYTES # BLD AUTO: 0.29 K/UL — LOW (ref 1–3.3)
LYMPHOCYTES # BLD AUTO: 0.3 K/UL — LOW (ref 1–3.3)
LYMPHOCYTES # BLD AUTO: 0.33 K/UL — LOW (ref 1–3.3)
LYMPHOCYTES # BLD AUTO: 0.93 K/UL — LOW (ref 1–3.3)
LYMPHOCYTES # BLD AUTO: 1.07 K/UL — SIGNIFICANT CHANGE UP (ref 1–3.3)
LYMPHOCYTES # BLD AUTO: 13.4 % — SIGNIFICANT CHANGE UP (ref 13–44)
LYMPHOCYTES # BLD AUTO: 15.5 % — SIGNIFICANT CHANGE UP (ref 13–44)
LYMPHOCYTES # BLD AUTO: 2.4 % — LOW (ref 13–44)
LYMPHOCYTES # BLD AUTO: 2.6 % — LOW (ref 13–44)
LYMPHOCYTES # BLD AUTO: 3.3 % — LOW (ref 13–44)
MAGNESIUM SERPL-MCNC: 2.2 MG/DL — SIGNIFICANT CHANGE UP (ref 1.6–2.6)
MAGNESIUM SERPL-MCNC: 2.4 MG/DL — SIGNIFICANT CHANGE UP (ref 1.6–2.6)
MCHC RBC-ENTMCNC: 26.8 PG — LOW (ref 27–34)
MCHC RBC-ENTMCNC: 27.2 PG — SIGNIFICANT CHANGE UP (ref 27–34)
MCHC RBC-ENTMCNC: 27.4 PG — SIGNIFICANT CHANGE UP (ref 27–34)
MCHC RBC-ENTMCNC: 27.5 PG — SIGNIFICANT CHANGE UP (ref 27–34)
MCHC RBC-ENTMCNC: 27.7 PG — SIGNIFICANT CHANGE UP (ref 27–34)
MCHC RBC-ENTMCNC: 28.1 PG — SIGNIFICANT CHANGE UP (ref 27–34)
MCHC RBC-ENTMCNC: 31.5 GM/DL — LOW (ref 32–36)
MCHC RBC-ENTMCNC: 32 GM/DL — SIGNIFICANT CHANGE UP (ref 32–36)
MCHC RBC-ENTMCNC: 32.3 GM/DL — SIGNIFICANT CHANGE UP (ref 32–36)
MCHC RBC-ENTMCNC: 32.4 GM/DL — SIGNIFICANT CHANGE UP (ref 32–36)
MCHC RBC-ENTMCNC: 32.5 GM/DL — SIGNIFICANT CHANGE UP (ref 32–36)
MCHC RBC-ENTMCNC: 33.4 GM/DL — SIGNIFICANT CHANGE UP (ref 32–36)
MCV RBC AUTO: 83 FL — SIGNIFICANT CHANGE UP (ref 80–100)
MCV RBC AUTO: 84.2 FL — SIGNIFICANT CHANGE UP (ref 80–100)
MCV RBC AUTO: 84.4 FL — SIGNIFICANT CHANGE UP (ref 80–100)
MCV RBC AUTO: 85.3 FL — SIGNIFICANT CHANGE UP (ref 80–100)
MCV RBC AUTO: 86 FL — SIGNIFICANT CHANGE UP (ref 80–100)
MCV RBC AUTO: 86.1 FL — SIGNIFICANT CHANGE UP (ref 80–100)
MONOCYTES # BLD AUTO: 0.19 K/UL — SIGNIFICANT CHANGE UP (ref 0–0.9)
MONOCYTES # BLD AUTO: 0.32 K/UL — SIGNIFICANT CHANGE UP (ref 0–0.9)
MONOCYTES # BLD AUTO: 0.57 K/UL — SIGNIFICANT CHANGE UP (ref 0–0.9)
MONOCYTES # BLD AUTO: 0.63 K/UL — SIGNIFICANT CHANGE UP (ref 0–0.9)
MONOCYTES # BLD AUTO: 0.87 K/UL — SIGNIFICANT CHANGE UP (ref 0–0.9)
MONOCYTES NFR BLD AUTO: 1.7 % — LOW (ref 2–14)
MONOCYTES NFR BLD AUTO: 10.9 % — SIGNIFICANT CHANGE UP (ref 2–14)
MONOCYTES NFR BLD AUTO: 3.2 % — SIGNIFICANT CHANGE UP (ref 2–14)
MONOCYTES NFR BLD AUTO: 5.1 % — SIGNIFICANT CHANGE UP (ref 2–14)
MONOCYTES NFR BLD AUTO: 9.5 % — SIGNIFICANT CHANGE UP (ref 2–14)
NEUTROPHILS # BLD AUTO: 10.5 K/UL — HIGH (ref 1.8–7.4)
NEUTROPHILS # BLD AUTO: 11.36 K/UL — HIGH (ref 1.8–7.4)
NEUTROPHILS # BLD AUTO: 4.21 K/UL — SIGNIFICANT CHANGE UP (ref 1.8–7.4)
NEUTROPHILS # BLD AUTO: 5.62 K/UL — SIGNIFICANT CHANGE UP (ref 1.8–7.4)
NEUTROPHILS # BLD AUTO: 9.22 K/UL — HIGH (ref 1.8–7.4)
NEUTROPHILS NFR BLD AUTO: 70 % — SIGNIFICANT CHANGE UP (ref 43–77)
NEUTROPHILS NFR BLD AUTO: 70.3 % — SIGNIFICANT CHANGE UP (ref 43–77)
NEUTROPHILS NFR BLD AUTO: 91.6 % — HIGH (ref 43–77)
NEUTROPHILS NFR BLD AUTO: 92.9 % — HIGH (ref 43–77)
NEUTROPHILS NFR BLD AUTO: 94.6 % — HIGH (ref 43–77)
NITRITE UR-MCNC: NEGATIVE — SIGNIFICANT CHANGE UP
NT-PROBNP SERPL-SCNC: 469 PG/ML — HIGH (ref 0–450)
NT-PROBNP SERPL-SCNC: 566 PG/ML — HIGH (ref 0–450)
PCO2 BLDA: 32 MMHG — LOW (ref 35–48)
PCO2 BLDA: 36 MMHG — SIGNIFICANT CHANGE UP (ref 35–48)
PCO2 BLDV: 30 MMHG — LOW (ref 42–55)
PH BLDA: 7.29 — LOW (ref 7.35–7.45)
PH BLDA: 7.32 — LOW (ref 7.35–7.45)
PH BLDV: 7.42 — SIGNIFICANT CHANGE UP (ref 7.32–7.43)
PH UR: 5 — SIGNIFICANT CHANGE UP (ref 5–8)
PHOSPHATE SERPL-MCNC: 4.4 MG/DL — SIGNIFICANT CHANGE UP (ref 2.5–4.5)
PLATELET # BLD AUTO: 142 K/UL — LOW (ref 150–400)
PLATELET # BLD AUTO: 159 K/UL — SIGNIFICANT CHANGE UP (ref 150–400)
PLATELET # BLD AUTO: 191 K/UL — SIGNIFICANT CHANGE UP (ref 150–400)
PLATELET # BLD AUTO: 200 K/UL — SIGNIFICANT CHANGE UP (ref 150–400)
PLATELET # BLD AUTO: 216 K/UL — SIGNIFICANT CHANGE UP (ref 150–400)
PLATELET # BLD AUTO: 218 K/UL — SIGNIFICANT CHANGE UP (ref 150–400)
PO2 BLDA: 195 MMHG — HIGH (ref 83–108)
PO2 BLDA: 253 MMHG — HIGH (ref 83–108)
PO2 BLDV: 70 MMHG — HIGH (ref 25–45)
POTASSIUM SERPL-MCNC: 4.2 MMOL/L — SIGNIFICANT CHANGE UP (ref 3.5–5.3)
POTASSIUM SERPL-MCNC: 4.3 MMOL/L — SIGNIFICANT CHANGE UP (ref 3.5–5.3)
POTASSIUM SERPL-MCNC: 4.3 MMOL/L — SIGNIFICANT CHANGE UP (ref 3.5–5.3)
POTASSIUM SERPL-MCNC: 4.6 MMOL/L — SIGNIFICANT CHANGE UP (ref 3.5–5.3)
POTASSIUM SERPL-MCNC: 4.6 MMOL/L — SIGNIFICANT CHANGE UP (ref 3.5–5.3)
POTASSIUM SERPL-MCNC: 4.8 MMOL/L — SIGNIFICANT CHANGE UP (ref 3.5–5.3)
POTASSIUM SERPL-MCNC: 4.8 MMOL/L — SIGNIFICANT CHANGE UP (ref 3.5–5.3)
POTASSIUM SERPL-MCNC: 5 MMOL/L — SIGNIFICANT CHANGE UP (ref 3.5–5.3)
POTASSIUM SERPL-MCNC: 5.1 MMOL/L — SIGNIFICANT CHANGE UP (ref 3.5–5.3)
POTASSIUM SERPL-SCNC: 4.2 MMOL/L — SIGNIFICANT CHANGE UP (ref 3.5–5.3)
POTASSIUM SERPL-SCNC: 4.3 MMOL/L — SIGNIFICANT CHANGE UP (ref 3.5–5.3)
POTASSIUM SERPL-SCNC: 4.3 MMOL/L — SIGNIFICANT CHANGE UP (ref 3.5–5.3)
POTASSIUM SERPL-SCNC: 4.6 MMOL/L — SIGNIFICANT CHANGE UP (ref 3.5–5.3)
POTASSIUM SERPL-SCNC: 4.6 MMOL/L — SIGNIFICANT CHANGE UP (ref 3.5–5.3)
POTASSIUM SERPL-SCNC: 4.8 MMOL/L — SIGNIFICANT CHANGE UP (ref 3.5–5.3)
POTASSIUM SERPL-SCNC: 4.8 MMOL/L — SIGNIFICANT CHANGE UP (ref 3.5–5.3)
POTASSIUM SERPL-SCNC: 5 MMOL/L — SIGNIFICANT CHANGE UP (ref 3.5–5.3)
POTASSIUM SERPL-SCNC: 5.1 MMOL/L — SIGNIFICANT CHANGE UP (ref 3.5–5.3)
PROCALCITONIN SERPL-MCNC: 0.08 NG/ML — SIGNIFICANT CHANGE UP (ref 0.02–0.1)
PROCALCITONIN SERPL-MCNC: 1.43 NG/ML — HIGH (ref 0.02–0.1)
PROT SERPL-MCNC: 5.6 GM/DL — LOW (ref 6–8.3)
PROT SERPL-MCNC: 5.7 GM/DL — LOW (ref 6–8.3)
PROT SERPL-MCNC: 6 GM/DL — SIGNIFICANT CHANGE UP (ref 6–8.3)
PROT SERPL-MCNC: 6.1 GM/DL — SIGNIFICANT CHANGE UP (ref 6–8.3)
PROT SERPL-MCNC: 6.4 GM/DL — SIGNIFICANT CHANGE UP (ref 6–8.3)
PROT SERPL-MCNC: 7.2 GM/DL — SIGNIFICANT CHANGE UP (ref 6–8.3)
PROT UR-MCNC: 30 MG/DL
PROTHROM AB SERPL-ACNC: 14.4 SEC — HIGH (ref 9.5–13)
PROTHROM AB SERPL-ACNC: 14.9 SEC — HIGH (ref 9.5–13)
PROTHROM AB SERPL-ACNC: 16.1 SEC — HIGH (ref 9.5–13)
RAPID RVP RESULT: SIGNIFICANT CHANGE UP
RBC # BLD: 3.46 M/UL — LOW (ref 4.2–5.8)
RBC # BLD: 3.47 M/UL — LOW (ref 4.2–5.8)
RBC # BLD: 3.56 M/UL — LOW (ref 4.2–5.8)
RBC # BLD: 3.98 M/UL — LOW (ref 4.2–5.8)
RBC # BLD: 4.41 M/UL — SIGNIFICANT CHANGE UP (ref 4.2–5.8)
RBC # BLD: 4.48 M/UL — SIGNIFICANT CHANGE UP (ref 4.2–5.8)
RBC # FLD: 14.6 % — HIGH (ref 10.3–14.5)
RBC # FLD: 14.9 % — HIGH (ref 10.3–14.5)
RBC # FLD: 15.7 % — HIGH (ref 10.3–14.5)
RBC # FLD: 15.7 % — HIGH (ref 10.3–14.5)
RBC # FLD: 15.9 % — HIGH (ref 10.3–14.5)
RBC # FLD: 15.9 % — HIGH (ref 10.3–14.5)
RBC CASTS # UR COMP ASSIST: 0 /HPF — SIGNIFICANT CHANGE UP (ref 0–4)
RSV RNA NPH QL NAA+NON-PROBE: SIGNIFICANT CHANGE UP
RSV RNA NPH QL NAA+NON-PROBE: SIGNIFICANT CHANGE UP
S PNEUM AG UR QL: NEGATIVE — SIGNIFICANT CHANGE UP
SAO2 % BLDA: 100 % — HIGH (ref 94–98)
SAO2 % BLDA: 100 % — HIGH (ref 94–98)
SAO2 % BLDV: 96 % — HIGH (ref 67–88)
SARS-COV-2 RNA SPEC QL NAA+PROBE: DETECTED
SARS-COV-2 RNA SPEC QL NAA+PROBE: SIGNIFICANT CHANGE UP
SARS-COV-2 RNA SPEC QL NAA+PROBE: SIGNIFICANT CHANGE UP
SODIUM SERPL-SCNC: 133 MMOL/L — LOW (ref 135–145)
SODIUM SERPL-SCNC: 136 MMOL/L — SIGNIFICANT CHANGE UP (ref 135–145)
SODIUM SERPL-SCNC: 137 MMOL/L — SIGNIFICANT CHANGE UP (ref 135–145)
SODIUM SERPL-SCNC: 139 MMOL/L — SIGNIFICANT CHANGE UP (ref 135–145)
SODIUM SERPL-SCNC: 141 MMOL/L — SIGNIFICANT CHANGE UP (ref 135–145)
SODIUM SERPL-SCNC: 143 MMOL/L — SIGNIFICANT CHANGE UP (ref 135–145)
SODIUM SERPL-SCNC: 144 MMOL/L — SIGNIFICANT CHANGE UP (ref 135–145)
SP GR SPEC: 1.02 — SIGNIFICANT CHANGE UP (ref 1–1.03)
SPECIMEN SOURCE: SIGNIFICANT CHANGE UP
SQUAMOUS # UR AUTO: 2 /HPF — SIGNIFICANT CHANGE UP (ref 0–5)
TROPONIN I, HIGH SENSITIVITY RESULT: 15.02 NG/L — SIGNIFICANT CHANGE UP
TROPONIN I, HIGH SENSITIVITY RESULT: 218.92 NG/L — HIGH
TROPONIN I, HIGH SENSITIVITY RESULT: 22.39 NG/L — SIGNIFICANT CHANGE UP
TROPONIN I, HIGH SENSITIVITY RESULT: 33.86 NG/L — SIGNIFICANT CHANGE UP
TSH SERPL-MCNC: 0.63 UU/ML — SIGNIFICANT CHANGE UP (ref 0.34–4.82)
TSH SERPL-MCNC: 1.85 UU/ML — SIGNIFICANT CHANGE UP (ref 0.34–4.82)
UROBILINOGEN FLD QL: 0.2 MG/DL — SIGNIFICANT CHANGE UP (ref 0.2–1)
WBC # BLD: 11.1 K/UL — HIGH (ref 3.8–10.5)
WBC # BLD: 12.39 K/UL — HIGH (ref 3.8–10.5)
WBC # BLD: 14.21 K/UL — HIGH (ref 3.8–10.5)
WBC # BLD: 6.01 K/UL — SIGNIFICANT CHANGE UP (ref 3.8–10.5)
WBC # BLD: 7.99 K/UL — SIGNIFICANT CHANGE UP (ref 3.8–10.5)
WBC # BLD: 9.93 K/UL — SIGNIFICANT CHANGE UP (ref 3.8–10.5)
WBC # FLD AUTO: 11.1 K/UL — HIGH (ref 3.8–10.5)
WBC # FLD AUTO: 12.39 K/UL — HIGH (ref 3.8–10.5)
WBC # FLD AUTO: 14.21 K/UL — HIGH (ref 3.8–10.5)
WBC # FLD AUTO: 6.01 K/UL — SIGNIFICANT CHANGE UP (ref 3.8–10.5)
WBC # FLD AUTO: 7.99 K/UL — SIGNIFICANT CHANGE UP (ref 3.8–10.5)
WBC # FLD AUTO: 9.93 K/UL — SIGNIFICANT CHANGE UP (ref 3.8–10.5)
WBC UR QL: 1 /HPF — SIGNIFICANT CHANGE UP (ref 0–5)

## 2024-01-01 PROCEDURE — 80076 HEPATIC FUNCTION PANEL: CPT

## 2024-01-01 PROCEDURE — 99497 ADVNCD CARE PLAN 30 MIN: CPT

## 2024-01-01 PROCEDURE — 93010 ELECTROCARDIOGRAM REPORT: CPT

## 2024-01-01 PROCEDURE — 83605 ASSAY OF LACTIC ACID: CPT

## 2024-01-01 PROCEDURE — 80053 COMPREHEN METABOLIC PANEL: CPT

## 2024-01-01 PROCEDURE — 71250 CT THORAX DX C-: CPT | Mod: 26,MC

## 2024-01-01 PROCEDURE — 99497 ADVNCD CARE PLAN 30 MIN: CPT | Mod: 25

## 2024-01-01 PROCEDURE — 93306 TTE W/DOPPLER COMPLETE: CPT | Mod: 26

## 2024-01-01 PROCEDURE — 82803 BLOOD GASES ANY COMBINATION: CPT

## 2024-01-01 PROCEDURE — 85027 COMPLETE CBC AUTOMATED: CPT

## 2024-01-01 PROCEDURE — 99223 1ST HOSP IP/OBS HIGH 75: CPT

## 2024-01-01 PROCEDURE — 99291 CRITICAL CARE FIRST HOUR: CPT

## 2024-01-01 PROCEDURE — 87449 NOS EACH ORGANISM AG IA: CPT

## 2024-01-01 PROCEDURE — 93306 TTE W/DOPPLER COMPLETE: CPT

## 2024-01-01 PROCEDURE — 71250 CT THORAX DX C-: CPT | Mod: 26

## 2024-01-01 PROCEDURE — 86140 C-REACTIVE PROTEIN: CPT

## 2024-01-01 PROCEDURE — 85652 RBC SED RATE AUTOMATED: CPT

## 2024-01-01 PROCEDURE — 84145 PROCALCITONIN (PCT): CPT

## 2024-01-01 PROCEDURE — 93970 EXTREMITY STUDY: CPT

## 2024-01-01 PROCEDURE — 71045 X-RAY EXAM CHEST 1 VIEW: CPT

## 2024-01-01 PROCEDURE — 97116 GAIT TRAINING THERAPY: CPT | Mod: GP

## 2024-01-01 PROCEDURE — 36600 WITHDRAWAL OF ARTERIAL BLOOD: CPT

## 2024-01-01 PROCEDURE — 84443 ASSAY THYROID STIM HORMONE: CPT

## 2024-01-01 PROCEDURE — 71045 X-RAY EXAM CHEST 1 VIEW: CPT | Mod: 26

## 2024-01-01 PROCEDURE — 85025 COMPLETE CBC W/AUTO DIFF WBC: CPT

## 2024-01-01 PROCEDURE — 99233 SBSQ HOSP IP/OBS HIGH 50: CPT

## 2024-01-01 PROCEDURE — 99498 ADVNCD CARE PLAN ADDL 30 MIN: CPT

## 2024-01-01 PROCEDURE — 84484 ASSAY OF TROPONIN QUANT: CPT

## 2024-01-01 PROCEDURE — 71250 CT THORAX DX C-: CPT

## 2024-01-01 PROCEDURE — 99239 HOSP IP/OBS DSCHRG MGMT >30: CPT

## 2024-01-01 PROCEDURE — 99285 EMERGENCY DEPT VISIT HI MDM: CPT

## 2024-01-01 PROCEDURE — 0225U NFCT DS DNA&RNA 21 SARSCOV2: CPT

## 2024-01-01 PROCEDURE — 97162 PT EVAL MOD COMPLEX 30 MIN: CPT | Mod: GP

## 2024-01-01 PROCEDURE — 82565 ASSAY OF CREATININE: CPT

## 2024-01-01 PROCEDURE — 85610 PROTHROMBIN TIME: CPT

## 2024-01-01 PROCEDURE — 36415 COLL VENOUS BLD VENIPUNCTURE: CPT

## 2024-01-01 PROCEDURE — 82962 GLUCOSE BLOOD TEST: CPT

## 2024-01-01 PROCEDURE — 80048 BASIC METABOLIC PNL TOTAL CA: CPT

## 2024-01-01 PROCEDURE — 83735 ASSAY OF MAGNESIUM: CPT

## 2024-01-01 PROCEDURE — 94640 AIRWAY INHALATION TREATMENT: CPT

## 2024-01-01 PROCEDURE — C9113: CPT

## 2024-01-01 PROCEDURE — 82728 ASSAY OF FERRITIN: CPT

## 2024-01-01 PROCEDURE — 0241U: CPT

## 2024-01-01 PROCEDURE — 93970 EXTREMITY STUDY: CPT | Mod: 26

## 2024-01-01 PROCEDURE — P9047: CPT

## 2024-01-01 PROCEDURE — 84100 ASSAY OF PHOSPHORUS: CPT

## 2024-01-01 PROCEDURE — 97530 THERAPEUTIC ACTIVITIES: CPT | Mod: GP

## 2024-01-01 PROCEDURE — 85379 FIBRIN DEGRADATION QUANT: CPT

## 2024-01-01 PROCEDURE — 83036 HEMOGLOBIN GLYCOSYLATED A1C: CPT

## 2024-01-01 PROCEDURE — 99292 CRITICAL CARE ADDL 30 MIN: CPT

## 2024-01-01 PROCEDURE — 94660 CPAP INITIATION&MGMT: CPT

## 2024-01-01 PROCEDURE — 87899 AGENT NOS ASSAY W/OPTIC: CPT

## 2024-01-01 RX ORDER — DEXTROSE 50 % IN WATER 50 %
15 SYRINGE (ML) INTRAVENOUS ONCE
Refills: 0 | Status: DISCONTINUED | OUTPATIENT
Start: 2024-01-01 | End: 2024-01-01

## 2024-01-01 RX ORDER — DEXMEDETOMIDINE HYDROCHLORIDE IN 0.9% SODIUM CHLORIDE 4 UG/ML
0.02 INJECTION INTRAVENOUS
Qty: 400 | Refills: 0 | Status: DISCONTINUED | OUTPATIENT
Start: 2024-01-01 | End: 2024-01-01

## 2024-01-01 RX ORDER — FUROSEMIDE 40 MG
40 TABLET ORAL DAILY
Refills: 0 | Status: DISCONTINUED | OUTPATIENT
Start: 2024-01-01 | End: 2024-01-01

## 2024-01-01 RX ORDER — BUDESONIDE AND FORMOTEROL FUMARATE DIHYDRATE 160; 4.5 UG/1; UG/1
2 AEROSOL RESPIRATORY (INHALATION)
Qty: 1 | Refills: 0
Start: 2024-01-01

## 2024-01-01 RX ORDER — DEXTROSE 50 % IN WATER 50 %
25 SYRINGE (ML) INTRAVENOUS ONCE
Refills: 0 | Status: DISCONTINUED | OUTPATIENT
Start: 2024-01-01 | End: 2024-01-01

## 2024-01-01 RX ORDER — ONDANSETRON 8 MG/1
4 TABLET, FILM COATED ORAL EVERY 8 HOURS
Refills: 0 | Status: DISCONTINUED | OUTPATIENT
Start: 2024-01-01 | End: 2024-01-01

## 2024-01-01 RX ORDER — AZITHROMYCIN 500 MG/1
500 TABLET, FILM COATED ORAL EVERY 24 HOURS
Refills: 0 | Status: DISCONTINUED | OUTPATIENT
Start: 2024-01-01 | End: 2024-01-01

## 2024-01-01 RX ORDER — ASPIRIN/CALCIUM CARB/MAGNESIUM 324 MG
81 TABLET ORAL DAILY
Refills: 0 | Status: DISCONTINUED | OUTPATIENT
Start: 2024-01-01 | End: 2024-01-01

## 2024-01-01 RX ORDER — ONDANSETRON 8 MG/1
4 TABLET, FILM COATED ORAL EVERY 6 HOURS
Refills: 0 | Status: DISCONTINUED | OUTPATIENT
Start: 2024-01-01 | End: 2024-01-01

## 2024-01-01 RX ORDER — REMDESIVIR 5 MG/ML
INJECTION INTRAVENOUS
Refills: 0 | Status: DISCONTINUED | OUTPATIENT
Start: 2024-01-01 | End: 2024-01-01

## 2024-01-01 RX ORDER — CEFTRIAXONE 500 MG/1
1000 INJECTION, POWDER, FOR SOLUTION INTRAMUSCULAR; INTRAVENOUS EVERY 24 HOURS
Refills: 0 | Status: DISCONTINUED | OUTPATIENT
Start: 2024-01-01 | End: 2024-01-01

## 2024-01-01 RX ORDER — NOREPINEPHRINE BITARTRATE/D5W 8 MG/250ML
0.05 PLASTIC BAG, INJECTION (ML) INTRAVENOUS
Qty: 8 | Refills: 0 | Status: DISCONTINUED | OUTPATIENT
Start: 2024-01-01 | End: 2024-01-01

## 2024-01-01 RX ORDER — SODIUM CHLORIDE 9 MG/ML
1000 INJECTION, SOLUTION INTRAVENOUS
Refills: 0 | Status: DISCONTINUED | OUTPATIENT
Start: 2024-01-01 | End: 2024-01-01

## 2024-01-01 RX ORDER — SODIUM CHLORIDE 9 MG/ML
1500 INJECTION INTRAMUSCULAR; INTRAVENOUS; SUBCUTANEOUS ONCE
Refills: 0 | Status: COMPLETED | OUTPATIENT
Start: 2024-01-01 | End: 2024-01-01

## 2024-01-01 RX ORDER — ENOXAPARIN SODIUM 100 MG/ML
30 INJECTION SUBCUTANEOUS EVERY 24 HOURS
Refills: 0 | Status: DISCONTINUED | OUTPATIENT
Start: 2024-01-01 | End: 2024-01-01

## 2024-01-01 RX ORDER — MULTIVIT-MIN/FERROUS GLUCONATE 9 MG/15 ML
1 LIQUID (ML) ORAL DAILY
Refills: 0 | Status: CANCELLED | OUTPATIENT
Start: 2024-01-01 | End: 2024-01-01

## 2024-01-01 RX ORDER — CHLORHEXIDINE GLUCONATE 213 G/1000ML
1 SOLUTION TOPICAL
Refills: 0 | Status: DISCONTINUED | OUTPATIENT
Start: 2024-01-01 | End: 2024-01-01

## 2024-01-01 RX ORDER — SODIUM CHLORIDE 9 MG/ML
1000 INJECTION INTRAMUSCULAR; INTRAVENOUS; SUBCUTANEOUS
Refills: 0 | Status: DISCONTINUED | OUTPATIENT
Start: 2024-01-01 | End: 2024-01-01

## 2024-01-01 RX ORDER — FUROSEMIDE 40 MG
40 TABLET ORAL ONCE
Refills: 0 | Status: COMPLETED | OUTPATIENT
Start: 2024-01-01 | End: 2024-01-01

## 2024-01-01 RX ORDER — SACUBITRIL AND VALSARTAN 24; 26 MG/1; MG/1
1 TABLET, FILM COATED ORAL
Refills: 0 | Status: DISCONTINUED | OUTPATIENT
Start: 2024-01-01 | End: 2024-01-01

## 2024-01-01 RX ORDER — REMDESIVIR 5 MG/ML
100 INJECTION INTRAVENOUS EVERY 24 HOURS
Refills: 0 | Status: DISCONTINUED | OUTPATIENT
Start: 2024-01-01 | End: 2024-01-01

## 2024-01-01 RX ORDER — ALBUMIN HUMAN 25 %
100 VIAL (ML) INTRAVENOUS EVERY 6 HOURS
Refills: 0 | Status: COMPLETED | OUTPATIENT
Start: 2024-01-01 | End: 2024-01-01

## 2024-01-01 RX ORDER — INSULIN LISPRO 100/ML
VIAL (ML) SUBCUTANEOUS AT BEDTIME
Refills: 0 | Status: DISCONTINUED | OUTPATIENT
Start: 2024-01-01 | End: 2024-01-01

## 2024-01-01 RX ORDER — SACUBITRIL AND VALSARTAN 24; 26 MG/1; MG/1
1 TABLET, FILM COATED ORAL
Refills: 0 | DISCHARGE

## 2024-01-01 RX ORDER — VANCOMYCIN HCL 1 G
1000 VIAL (EA) INTRAVENOUS ONCE
Refills: 0 | Status: COMPLETED | OUTPATIENT
Start: 2024-01-01 | End: 2024-01-01

## 2024-01-01 RX ORDER — CLOPIDOGREL BISULFATE 75 MG/1
75 TABLET, FILM COATED ORAL DAILY
Refills: 0 | Status: DISCONTINUED | OUTPATIENT
Start: 2024-01-01 | End: 2024-01-01

## 2024-01-01 RX ORDER — BUDESONIDE AND FORMOTEROL FUMARATE DIHYDRATE 160; 4.5 UG/1; UG/1
2 AEROSOL RESPIRATORY (INHALATION)
Refills: 0 | Status: DISCONTINUED | OUTPATIENT
Start: 2024-01-01 | End: 2024-01-01

## 2024-01-01 RX ORDER — SODIUM CHLORIDE 9 MG/ML
500 INJECTION, SOLUTION INTRAVENOUS ONCE
Refills: 0 | Status: COMPLETED | OUTPATIENT
Start: 2024-01-01 | End: 2024-01-01

## 2024-01-01 RX ORDER — CHOLECALCIFEROL (VITAMIN D3) 125 MCG
1 CAPSULE ORAL
Refills: 0 | DISCHARGE

## 2024-01-01 RX ORDER — MORPHINE SULFATE 50 MG/1
4 CAPSULE, EXTENDED RELEASE ORAL
Refills: 0 | Status: DISCONTINUED | OUTPATIENT
Start: 2024-01-01 | End: 2024-01-01

## 2024-01-01 RX ORDER — ALBUTEROL 90 UG/1
2 AEROSOL, METERED ORAL EVERY 6 HOURS
Refills: 0 | Status: DISCONTINUED | OUTPATIENT
Start: 2024-01-01 | End: 2024-01-01

## 2024-01-01 RX ORDER — SODIUM CHLORIDE 9 MG/ML
500 INJECTION INTRAMUSCULAR; INTRAVENOUS; SUBCUTANEOUS ONCE
Refills: 0 | Status: COMPLETED | OUTPATIENT
Start: 2024-01-01 | End: 2024-01-01

## 2024-01-01 RX ORDER — ACETAMINOPHEN 500 MG
650 TABLET ORAL EVERY 6 HOURS
Refills: 0 | Status: DISCONTINUED | OUTPATIENT
Start: 2024-01-01 | End: 2024-01-01

## 2024-01-01 RX ORDER — FERROUS SULFATE 325(65) MG
1 TABLET ORAL
Refills: 0 | DISCHARGE

## 2024-01-01 RX ORDER — AZITHROMYCIN 500 MG/1
500 TABLET, FILM COATED ORAL ONCE
Refills: 0 | Status: COMPLETED | OUTPATIENT
Start: 2024-01-01 | End: 2024-01-01

## 2024-01-01 RX ORDER — MORPHINE SULFATE 50 MG/1
2 CAPSULE, EXTENDED RELEASE ORAL
Refills: 0 | Status: DISCONTINUED | OUTPATIENT
Start: 2024-01-01 | End: 2024-01-01

## 2024-01-01 RX ORDER — ATORVASTATIN CALCIUM 80 MG/1
1 TABLET, FILM COATED ORAL
Refills: 0 | DISCHARGE

## 2024-01-01 RX ORDER — CEFEPIME 1 G/1
1000 INJECTION, POWDER, FOR SOLUTION INTRAMUSCULAR; INTRAVENOUS ONCE
Refills: 0 | Status: DISCONTINUED | OUTPATIENT
Start: 2024-01-01 | End: 2024-01-01

## 2024-01-01 RX ORDER — IPRATROPIUM/ALBUTEROL SULFATE 18-103MCG
3 AEROSOL WITH ADAPTER (GRAM) INHALATION EVERY 6 HOURS
Refills: 0 | Status: DISCONTINUED | OUTPATIENT
Start: 2024-01-01 | End: 2024-01-01

## 2024-01-01 RX ORDER — THIAMINE MONONITRATE (VIT B1) 100 MG
100 TABLET ORAL DAILY
Refills: 0 | Status: CANCELLED | OUTPATIENT
Start: 2024-01-01 | End: 2024-01-01

## 2024-01-01 RX ORDER — LANOLIN ALCOHOL/MO/W.PET/CERES
3 CREAM (GRAM) TOPICAL AT BEDTIME
Refills: 0 | Status: DISCONTINUED | OUTPATIENT
Start: 2024-01-01 | End: 2024-01-01

## 2024-01-01 RX ORDER — PANTOPRAZOLE SODIUM 20 MG/1
40 TABLET, DELAYED RELEASE ORAL
Refills: 0 | Status: DISCONTINUED | OUTPATIENT
Start: 2024-01-01 | End: 2024-01-01

## 2024-01-01 RX ORDER — HEPARIN SODIUM 5000 [USP'U]/ML
5000 INJECTION INTRAVENOUS; SUBCUTANEOUS EVERY 8 HOURS
Refills: 0 | Status: DISCONTINUED | OUTPATIENT
Start: 2024-01-01 | End: 2024-01-01

## 2024-01-01 RX ORDER — AZITHROMYCIN 500 MG/1
TABLET, FILM COATED ORAL
Refills: 0 | Status: DISCONTINUED | OUTPATIENT
Start: 2024-01-01 | End: 2024-01-01

## 2024-01-01 RX ORDER — MORPHINE SULFATE 50 MG/1
1 CAPSULE, EXTENDED RELEASE ORAL ONCE
Refills: 0 | Status: DISCONTINUED | OUTPATIENT
Start: 2024-01-01 | End: 2024-01-01

## 2024-01-01 RX ORDER — ALBUTEROL 90 UG/1
3 AEROSOL, METERED ORAL
Refills: 0 | DISCHARGE

## 2024-01-01 RX ORDER — TIOTROPIUM BROMIDE 18 UG/1
2 CAPSULE ORAL; RESPIRATORY (INHALATION) DAILY
Refills: 0 | Status: DISCONTINUED | OUTPATIENT
Start: 2024-01-01 | End: 2024-01-01

## 2024-01-01 RX ORDER — METOPROLOL TARTRATE 50 MG
12.5 TABLET ORAL DAILY
Refills: 0 | Status: DISCONTINUED | OUTPATIENT
Start: 2024-01-01 | End: 2024-01-01

## 2024-01-01 RX ORDER — FERROUS SULFATE 325(65) MG
325 TABLET ORAL DAILY
Refills: 0 | Status: DISCONTINUED | OUTPATIENT
Start: 2024-01-01 | End: 2024-01-01

## 2024-01-01 RX ORDER — INSULIN LISPRO 100/ML
VIAL (ML) SUBCUTANEOUS
Refills: 0 | Status: DISCONTINUED | OUTPATIENT
Start: 2024-01-01 | End: 2024-01-01

## 2024-01-01 RX ORDER — HYDROMORPHONE HYDROCHLORIDE 2 MG/ML
1.5 INJECTION INTRAMUSCULAR; INTRAVENOUS; SUBCUTANEOUS
Refills: 0 | Status: DISCONTINUED | OUTPATIENT
Start: 2024-01-01 | End: 2024-01-01

## 2024-01-01 RX ORDER — ATORVASTATIN CALCIUM 80 MG/1
10 TABLET, FILM COATED ORAL AT BEDTIME
Refills: 0 | Status: DISCONTINUED | OUTPATIENT
Start: 2024-01-01 | End: 2024-01-01

## 2024-01-01 RX ORDER — DEXTROSE 50 % IN WATER 50 %
12.5 SYRINGE (ML) INTRAVENOUS ONCE
Refills: 0 | Status: DISCONTINUED | OUTPATIENT
Start: 2024-01-01 | End: 2024-01-01

## 2024-01-01 RX ORDER — DEXAMETHASONE 0.5 MG/5ML
6 ELIXIR ORAL DAILY
Refills: 0 | Status: DISCONTINUED | OUTPATIENT
Start: 2024-01-01 | End: 2024-01-01

## 2024-01-01 RX ORDER — PANTOPRAZOLE SODIUM 20 MG/1
40 TABLET, DELAYED RELEASE ORAL DAILY
Refills: 0 | Status: DISCONTINUED | OUTPATIENT
Start: 2024-01-01 | End: 2024-01-01

## 2024-01-01 RX ORDER — CEFEPIME 1 G/1
1000 INJECTION, POWDER, FOR SOLUTION INTRAMUSCULAR; INTRAVENOUS ONCE
Refills: 0 | Status: COMPLETED | OUTPATIENT
Start: 2024-01-01 | End: 2024-01-01

## 2024-01-01 RX ORDER — BUDESONIDE, MICRONIZED 100 %
2 POWDER (GRAM) MISCELLANEOUS
Refills: 0 | DISCHARGE

## 2024-01-01 RX ORDER — REMDESIVIR 5 MG/ML
200 INJECTION INTRAVENOUS EVERY 24 HOURS
Refills: 0 | Status: COMPLETED | OUTPATIENT
Start: 2024-01-01 | End: 2024-01-01

## 2024-01-01 RX ORDER — TIOTROPIUM BROMIDE 18 UG/1
2 CAPSULE ORAL; RESPIRATORY (INHALATION)
Qty: 1 | Refills: 0
Start: 2024-01-01

## 2024-01-01 RX ORDER — GLUCAGON INJECTION, SOLUTION 0.5 MG/.1ML
1 INJECTION, SOLUTION SUBCUTANEOUS ONCE
Refills: 0 | Status: DISCONTINUED | OUTPATIENT
Start: 2024-01-01 | End: 2024-01-01

## 2024-01-01 RX ORDER — AZITHROMYCIN 500 MG/1
500 TABLET, FILM COATED ORAL DAILY
Refills: 0 | Status: DISCONTINUED | OUTPATIENT
Start: 2024-01-01 | End: 2024-01-01

## 2024-01-01 RX ADMIN — Medication 325 MILLIGRAM(S): at 10:54

## 2024-01-01 RX ADMIN — HEPARIN SODIUM 5000 UNIT(S): 5000 INJECTION INTRAVENOUS; SUBCUTANEOUS at 05:11

## 2024-01-01 RX ADMIN — Medication 81 MILLIGRAM(S): at 10:31

## 2024-01-01 RX ADMIN — Medication 2: at 12:15

## 2024-01-01 RX ADMIN — CEFTRIAXONE 1000 MILLIGRAM(S): 500 INJECTION, POWDER, FOR SOLUTION INTRAMUSCULAR; INTRAVENOUS at 21:20

## 2024-01-01 RX ADMIN — Medication 81 MILLIGRAM(S): at 10:54

## 2024-01-01 RX ADMIN — CLOPIDOGREL BISULFATE 75 MILLIGRAM(S): 75 TABLET, FILM COATED ORAL at 09:26

## 2024-01-01 RX ADMIN — Medication 40 MILLIGRAM(S): at 13:31

## 2024-01-01 RX ADMIN — PANTOPRAZOLE SODIUM 40 MILLIGRAM(S): 20 TABLET, DELAYED RELEASE ORAL at 10:53

## 2024-01-01 RX ADMIN — Medication 81 MILLIGRAM(S): at 09:26

## 2024-01-01 RX ADMIN — SODIUM CHLORIDE 75 MILLILITER(S): 9 INJECTION, SOLUTION INTRAVENOUS at 02:06

## 2024-01-01 RX ADMIN — Medication 2 MILLIGRAM(S): at 16:40

## 2024-01-01 RX ADMIN — BUDESONIDE AND FORMOTEROL FUMARATE DIHYDRATE 2 PUFF(S): 160; 4.5 AEROSOL RESPIRATORY (INHALATION) at 20:44

## 2024-01-01 RX ADMIN — ATORVASTATIN CALCIUM 10 MILLIGRAM(S): 80 TABLET, FILM COATED ORAL at 21:45

## 2024-01-01 RX ADMIN — Medication 325 MILLIGRAM(S): at 09:26

## 2024-01-01 RX ADMIN — Medication 40 MILLIGRAM(S): at 09:27

## 2024-01-01 RX ADMIN — TIOTROPIUM BROMIDE 2 PUFF(S): 18 CAPSULE ORAL; RESPIRATORY (INHALATION) at 10:30

## 2024-01-01 RX ADMIN — CEFEPIME 1000 MILLIGRAM(S): 1 INJECTION, POWDER, FOR SOLUTION INTRAMUSCULAR; INTRAVENOUS at 09:58

## 2024-01-01 RX ADMIN — MORPHINE SULFATE 2 MILLIGRAM(S): 50 CAPSULE, EXTENDED RELEASE ORAL at 08:58

## 2024-01-01 RX ADMIN — HEPARIN SODIUM 5000 UNIT(S): 5000 INJECTION INTRAVENOUS; SUBCUTANEOUS at 21:20

## 2024-01-01 RX ADMIN — BUDESONIDE AND FORMOTEROL FUMARATE DIHYDRATE 2 PUFF(S): 160; 4.5 AEROSOL RESPIRATORY (INHALATION) at 09:23

## 2024-01-01 RX ADMIN — HYDROMORPHONE HYDROCHLORIDE 1.5 MILLIGRAM(S): 2 INJECTION INTRAMUSCULAR; INTRAVENOUS; SUBCUTANEOUS at 16:39

## 2024-01-01 RX ADMIN — SACUBITRIL AND VALSARTAN 1 TABLET(S): 24; 26 TABLET, FILM COATED ORAL at 22:20

## 2024-01-01 RX ADMIN — ATORVASTATIN CALCIUM 10 MILLIGRAM(S): 80 TABLET, FILM COATED ORAL at 21:20

## 2024-01-01 RX ADMIN — BUDESONIDE AND FORMOTEROL FUMARATE DIHYDRATE 2 PUFF(S): 160; 4.5 AEROSOL RESPIRATORY (INHALATION) at 20:48

## 2024-01-01 RX ADMIN — CHLORHEXIDINE GLUCONATE 1 APPLICATION(S): 213 SOLUTION TOPICAL at 05:12

## 2024-01-01 RX ADMIN — SODIUM CHLORIDE 500 MILLILITER(S): 9 INJECTION INTRAMUSCULAR; INTRAVENOUS; SUBCUTANEOUS at 10:30

## 2024-01-01 RX ADMIN — Medication 1 MILLIGRAM(S): at 18:00

## 2024-01-01 RX ADMIN — Medication 40 MILLIGRAM(S): at 16:54

## 2024-01-01 RX ADMIN — Medication 12.5 MILLIGRAM(S): at 09:27

## 2024-01-01 RX ADMIN — ATORVASTATIN CALCIUM 10 MILLIGRAM(S): 80 TABLET, FILM COATED ORAL at 22:37

## 2024-01-01 RX ADMIN — Medication 50 MILLILITER(S): at 18:19

## 2024-01-01 RX ADMIN — HYDROMORPHONE HYDROCHLORIDE 1.5 MILLIGRAM(S): 2 INJECTION INTRAMUSCULAR; INTRAVENOUS; SUBCUTANEOUS at 19:50

## 2024-01-01 RX ADMIN — BUDESONIDE AND FORMOTEROL FUMARATE DIHYDRATE 2 PUFF(S): 160; 4.5 AEROSOL RESPIRATORY (INHALATION) at 07:59

## 2024-01-01 RX ADMIN — Medication 40 MILLIGRAM(S): at 22:37

## 2024-01-01 RX ADMIN — Medication 40 MILLIGRAM(S): at 22:21

## 2024-01-01 RX ADMIN — SODIUM CHLORIDE 3000 MILLILITER(S): 9 INJECTION INTRAMUSCULAR; INTRAVENOUS; SUBCUTANEOUS at 12:24

## 2024-01-01 RX ADMIN — BUDESONIDE AND FORMOTEROL FUMARATE DIHYDRATE 2 PUFF(S): 160; 4.5 AEROSOL RESPIRATORY (INHALATION) at 08:10

## 2024-01-01 RX ADMIN — SACUBITRIL AND VALSARTAN 1 TABLET(S): 24; 26 TABLET, FILM COATED ORAL at 10:52

## 2024-01-01 RX ADMIN — AZITHROMYCIN 255 MILLIGRAM(S): 500 TABLET, FILM COATED ORAL at 21:42

## 2024-01-01 RX ADMIN — Medication 3 MILLILITER(S): at 02:45

## 2024-01-01 RX ADMIN — Medication 40 MILLIGRAM(S): at 11:23

## 2024-01-01 RX ADMIN — BUDESONIDE AND FORMOTEROL FUMARATE DIHYDRATE 2 PUFF(S): 160; 4.5 AEROSOL RESPIRATORY (INHALATION) at 10:31

## 2024-01-01 RX ADMIN — REMDESIVIR 200 MILLIGRAM(S): 5 INJECTION INTRAVENOUS at 11:33

## 2024-01-01 RX ADMIN — Medication 40 MILLIGRAM(S): at 21:47

## 2024-01-01 RX ADMIN — Medication 81 MILLIGRAM(S): at 10:53

## 2024-01-01 RX ADMIN — SODIUM CHLORIDE 500 MILLILITER(S): 9 INJECTION, SOLUTION INTRAVENOUS at 21:43

## 2024-01-01 RX ADMIN — CLOPIDOGREL BISULFATE 75 MILLIGRAM(S): 75 TABLET, FILM COATED ORAL at 10:31

## 2024-01-01 RX ADMIN — ALBUTEROL 2 PUFF(S): 90 AEROSOL, METERED ORAL at 09:23

## 2024-01-01 RX ADMIN — Medication 5.62 MICROGRAM(S)/KG/MIN: at 01:07

## 2024-01-01 RX ADMIN — Medication 40 MILLIGRAM(S): at 10:33

## 2024-01-01 RX ADMIN — Medication 40 MILLIGRAM(S): at 05:17

## 2024-01-01 RX ADMIN — ALBUTEROL 2 PUFF(S): 90 AEROSOL, METERED ORAL at 10:31

## 2024-01-01 RX ADMIN — CLOPIDOGREL BISULFATE 75 MILLIGRAM(S): 75 TABLET, FILM COATED ORAL at 10:54

## 2024-01-01 RX ADMIN — CEFTRIAXONE 1000 MILLIGRAM(S): 500 INJECTION, POWDER, FOR SOLUTION INTRAMUSCULAR; INTRAVENOUS at 21:40

## 2024-01-01 RX ADMIN — CLOPIDOGREL BISULFATE 75 MILLIGRAM(S): 75 TABLET, FILM COATED ORAL at 10:53

## 2024-01-01 RX ADMIN — HEPARIN SODIUM 5000 UNIT(S): 5000 INJECTION INTRAVENOUS; SUBCUTANEOUS at 13:31

## 2024-01-01 RX ADMIN — Medication 40 MILLIGRAM(S): at 21:40

## 2024-01-01 RX ADMIN — Medication 40 MILLIGRAM(S): at 20:16

## 2024-01-01 RX ADMIN — BUDESONIDE AND FORMOTEROL FUMARATE DIHYDRATE 2 PUFF(S): 160; 4.5 AEROSOL RESPIRATORY (INHALATION) at 20:40

## 2024-01-01 RX ADMIN — Medication 12.5 MILLIGRAM(S): at 10:33

## 2024-01-01 RX ADMIN — TIOTROPIUM BROMIDE 2 PUFF(S): 18 CAPSULE ORAL; RESPIRATORY (INHALATION) at 08:09

## 2024-01-01 RX ADMIN — SACUBITRIL AND VALSARTAN 1 TABLET(S): 24; 26 TABLET, FILM COATED ORAL at 10:54

## 2024-01-01 RX ADMIN — Medication 5.62 MICROGRAM(S)/KG/MIN: at 21:19

## 2024-01-01 RX ADMIN — Medication 81 MILLIGRAM(S): at 11:24

## 2024-01-01 RX ADMIN — Medication 50 MILLILITER(S): at 11:56

## 2024-01-01 RX ADMIN — Medication 325 MILLIGRAM(S): at 10:52

## 2024-01-01 RX ADMIN — SACUBITRIL AND VALSARTAN 1 TABLET(S): 24; 26 TABLET, FILM COATED ORAL at 10:31

## 2024-01-01 RX ADMIN — HEPARIN SODIUM 5000 UNIT(S): 5000 INJECTION INTRAVENOUS; SUBCUTANEOUS at 21:40

## 2024-01-01 RX ADMIN — Medication 40 MILLIGRAM(S): at 10:54

## 2024-01-01 RX ADMIN — Medication 40 MILLIGRAM(S): at 16:15

## 2024-01-01 RX ADMIN — AZITHROMYCIN 255 MILLIGRAM(S): 500 TABLET, FILM COATED ORAL at 20:24

## 2024-01-01 RX ADMIN — SODIUM CHLORIDE 75 MILLILITER(S): 9 INJECTION, SOLUTION INTRAVENOUS at 21:39

## 2024-01-01 RX ADMIN — TIOTROPIUM BROMIDE 2 PUFF(S): 18 CAPSULE ORAL; RESPIRATORY (INHALATION) at 09:23

## 2024-01-01 RX ADMIN — SODIUM CHLORIDE 1000 MILLILITER(S): 9 INJECTION INTRAMUSCULAR; INTRAVENOUS; SUBCUTANEOUS at 09:59

## 2024-01-01 RX ADMIN — SODIUM CHLORIDE 75 MILLILITER(S): 9 INJECTION, SOLUTION INTRAVENOUS at 05:11

## 2024-01-01 RX ADMIN — REMDESIVIR 200 MILLIGRAM(S): 5 INJECTION INTRAVENOUS at 12:44

## 2024-01-01 RX ADMIN — Medication 250 MILLIGRAM(S): at 21:53

## 2024-01-01 RX ADMIN — Medication 325 MILLIGRAM(S): at 10:31

## 2024-01-01 RX ADMIN — PANTOPRAZOLE SODIUM 40 MILLIGRAM(S): 20 TABLET, DELAYED RELEASE ORAL at 11:32

## 2024-01-01 RX ADMIN — CHLORHEXIDINE GLUCONATE 1 APPLICATION(S): 213 SOLUTION TOPICAL at 05:33

## 2024-01-01 RX ADMIN — Medication 40 MILLIGRAM(S): at 21:20

## 2024-01-01 RX ADMIN — REMDESIVIR 200 MILLIGRAM(S): 5 INJECTION INTRAVENOUS at 11:56

## 2024-01-01 RX ADMIN — TIOTROPIUM BROMIDE 2 PUFF(S): 18 CAPSULE ORAL; RESPIRATORY (INHALATION) at 08:01

## 2024-01-01 RX ADMIN — SODIUM CHLORIDE 500 MILLILITER(S): 9 INJECTION, SOLUTION INTRAVENOUS at 00:29

## 2024-01-01 RX ADMIN — CLOPIDOGREL BISULFATE 75 MILLIGRAM(S): 75 TABLET, FILM COATED ORAL at 11:24

## 2024-01-01 RX ADMIN — Medication 40 MILLIGRAM(S): at 05:11

## 2024-01-01 RX ADMIN — Medication 50 MILLILITER(S): at 05:41

## 2024-01-01 RX ADMIN — Medication 1 MILLIGRAM(S): at 09:57

## 2024-01-01 RX ADMIN — MORPHINE SULFATE 4 MILLIGRAM(S): 50 CAPSULE, EXTENDED RELEASE ORAL at 16:09

## 2024-01-01 RX ADMIN — ATORVASTATIN CALCIUM 10 MILLIGRAM(S): 80 TABLET, FILM COATED ORAL at 22:20

## 2024-01-01 RX ADMIN — ALBUTEROL 2 PUFF(S): 90 AEROSOL, METERED ORAL at 09:42

## 2024-01-01 RX ADMIN — Medication 325 MILLIGRAM(S): at 11:24

## 2024-01-01 RX ADMIN — HEPARIN SODIUM 5000 UNIT(S): 5000 INJECTION INTRAVENOUS; SUBCUTANEOUS at 05:17

## 2024-01-01 RX ADMIN — MORPHINE SULFATE 1 MILLIGRAM(S): 50 CAPSULE, EXTENDED RELEASE ORAL at 18:12

## 2024-02-04 NOTE — ED ADULT NURSE NOTE - NSICDXPASTMEDICALHX_GEN_ALL_CORE_FT
PAST MEDICAL HISTORY:  Arthritis     Deep vein thrombosis (DVT) Right leg 2016 ? unknown cause    DVT (deep venous thrombosis)     [place] (hard of hearing) uses hearing aid    Left carpal tunnel syndrome     Squamous cell carcinoma

## 2024-02-04 NOTE — ED STATDOCS - OBJECTIVE STATEMENT
87 y/o male w/ a PMHx of CAD s/p stents x3 x few months ago w/ Dr. Jiang on Plavix and ASA, HLD on Atorvastatin, DVT, Mentasta, and SCC, and COPD presents to the ED c/o worsening SOB and dry cough x few days. Pt endorses mild SOB at rest, worse upon movement or stairs. Pt states after the stents he was off the home O2 however has been using 2L home O2 again. Denies CP, fever, chills, abd pain, n/v/d, or sick contacts. Pt states this doesn't feel like his normal COPD. No other complaints at this time. Cardio: Dr. DAVID Alvarez.

## 2024-02-04 NOTE — ED ADULT NURSE NOTE - ALCOHOL PRE SCREEN (AUDIT - C)
Removal of IUD    Date/Time: 12/7/2022 9:00 AM  Performed by: Amber Valencia MD  Authorized by: Amber Valencia MD     Consent obtained:  Written  Consent given by:  Patient  Procedure risks and benefits discussed: yes    Patient questions answered: yes    Patient agrees, verbalizes understanding, and wants to proceed: yes    IUD grasped by: forceps  Other reason for removal:  Patient request  Removed with no complications: yes     Statement Selected

## 2024-02-04 NOTE — ED STATDOCS - CLINICAL SUMMARY MEDICAL DECISION MAKING FREE TEXT BOX
Elderly male coming in w/ SOB and cough, appears fluid overloaded on exam, sating 94% on RA. Plan for CXR, Lasix, admit to tele for heart failure eval.

## 2024-02-04 NOTE — ED ADULT NURSE NOTE - NSFALLHARMRISKINTERV_ED_ALL_ED

## 2024-02-04 NOTE — ED STATDOCS - NSICDXPASTMEDICALHX_GEN_ALL_CORE_FT
PAST MEDICAL HISTORY:  Arthritis     Deep vein thrombosis (DVT) Right leg 2016 ? unknown cause    DVT (deep venous thrombosis)     [place] (hard of hearing) uses hearing aid    Left carpal tunnel syndrome     Squamous cell carcinoma

## 2024-02-04 NOTE — ED ADULT TRIAGE NOTE - CHIEF COMPLAINT QUOTE
Pt c/o difficulty breathing starting yesterday. HX of COPD and 3 cardiac stents on Plavix. Denies chest pain and fever. STAT EKG to be completed.

## 2024-02-05 NOTE — PROGRESS NOTE ADULT - CONVERSATION DETAILS
Pt reports HCP is wife (Katie) and son (Yung). Reports is full code with trial of critical care. In setting of cardiac arrest would want chest compressions, defib attempt and intubation. Full code at this time. Wife at bedside, in agreement.

## 2024-02-05 NOTE — PHYSICAL THERAPY INITIAL EVALUATION ADULT - PERTINENT HX OF CURRENT PROBLEM, REHAB EVAL
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 Pt reports it was difficult today to get his newspaper outside and symptoms are interfering with QOL. Associated with dry cough. Denies fever, chills, or sputum production. Reports taking Albuterol as needed at home.

## 2024-02-05 NOTE — CONSULT NOTE ADULT - SUBJECTIVE AND OBJECTIVE BOX
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 Pt reports it was difficult today to get his newspaper outside and symptoms are interfering with QOL. Associated with dry cough. Denies fever, chills, or sputum production. Reports taking Albuterol as needed at home.   - In the ED, VS stable, SpO2 bet 90-93%. Received Lasix 40 mg IV after which pt reports improvement in symptoms. CXR: persistent central infiltrates similar to Sept. (05 Feb 2024 00:48)    Cardiology consulted for sob      MEDICATIONS  (STANDING):  aspirin enteric coated 81 milliGRAM(s) Oral daily  atorvastatin 10 milliGRAM(s) Oral at bedtime  budesonide 160 MICROgram(s)/formoterol 4.5 MICROgram(s) Inhaler 2 Puff(s) Inhalation two times a day  clopidogrel Tablet 75 milliGRAM(s) Oral daily  ferrous    sulfate 325 milliGRAM(s) Oral daily  furosemide    Tablet 40 milliGRAM(s) Oral daily  sacubitril 24 mG/valsartan 26 mG 1 Tablet(s) Oral two times a day  tiotropium 2.5 MICROgram(s) Inhaler 2 Puff(s) Inhalation daily    MEDICATIONS  (PRN):  acetaminophen     Tablet .. 650 milliGRAM(s) Oral every 6 hours PRN Mild Pain (1 - 3)  albuterol    90 MICROgram(s) HFA Inhaler 2 Puff(s) Inhalation every 6 hours PRN Shortness of Breath and/or Wheezing  aluminum hydroxide/magnesium hydroxide/simethicone Suspension 30 milliLiter(s) Oral every 4 hours PRN Dyspepsia  melatonin 3 milliGRAM(s) Oral at bedtime PRN Insomnia  ondansetron Injectable 4 milliGRAM(s) IV Push every 8 hours PRN Nausea and/or Vomiting    HOME MEDICATIONS:  aspirin 81 mg oral delayed release tablet: 1 tab(s) orally once a day (04 Feb 2024 18:09)  atorvastatin 10 mg oral tablet: 1 tab(s) orally once a day (04 Feb 2024 18:09)  cholecalciferol 25 mcg (1000 intl units) oral tablet: 1 tab(s) orally (04 Feb 2024 19:52)  Entresto 24 mg-26 mg oral tablet: 1 tab(s) orally 2 times a day (04 Feb 2024 19:52)  ferrous sulfate 325 mg (65 mg elemental iron) oral tablet: 1 tab(s) orally once a day (04 Feb 2024 19:52)    PHYSICAL EXAM:  T(C): 36.3 (05 Feb 2024 08:14), Max: 37.2 (05 Feb 2024 03:00)  T(F): 97.4 (05 Feb 2024 08:14), Max: 98.9 (05 Feb 2024 03:00)  HR: 81 (05 Feb 2024 08:14) (68 - 95)  BP: 119/50 (05 Feb 2024 08:14) (108/60 - 141/88)  BP(mean): 79 (05 Feb 2024 00:00) (71 - 103)  RR: 18 (05 Feb 2024 08:14) (15 - 30)  SpO2: 92% (05 Feb 2024 08:14) (92% - 100%)    Parameters below as of 05 Feb 2024 08:14  Patient On (Oxygen Delivery Method): room air    Constitutional: NAD, awake and alert  HEENT: PERR, EOMI, Normal Hearing, MMM  Neck: Soft and supple, No LAD, No JVD  Respiratory: Breath sounds are clear bilaterally, No wheezing, rales or rhonchi  Cardiovascular: S1 and S2, regular rate and rhythm, no Murmurs, gallops or rubs  Gastrointestinal: Bowel Sounds present, soft, nontender, nondistended, no guarding, no rebound  Extremities: No peripheral edema  Vascular: 2+ peripheral pulses  Neurological: A/O x 3, no focal deficits  Musculoskeletal: 5/5 strength b/l upper and lower extremities  Skin: No rashes    =======================================    INTERPRETATION OF TELEMETRY: SR 80s    ECG: SR    ========================================    LABS:                        12.0   7.99  )-----------( 216      ( 05 Feb 2024 06:49 )             37.2     02-05    136  |  107  |  34<H>  ----------------------------<  115<H>  4.6   |  25  |  1.05    Ca    9.0      05 Feb 2024 06:49  Mg     2.2     02-04    TPro  7.2  /  Alb  3.2<L>  /  TBili  0.7  /  DBili  x   /  AST  23  /  ALT  32  /  AlkPhos  106  02-04    TroponinI hsT: <-22.39, <-15.02        CARDIAC TESTING:    TTE W or WO Ultrasound Enhancing Agent (09.26.23 @ 08:12) >   1. Technically difficult image quality.   2. Left ventricular systolic function is severely decreased with an ejection fraction visually estimated at 30 to 35 %. There are regional wall motion abnormalities.   3. Multiple segmental abnormalities exist. See findings.   4. Mild to moderate pulmonary hypertension.    < from: Cardiac Catheterization (09.26.23 @ 14:21) >  Successful PCI with JEFFRY to the mid LAD and CSI of proximal LAD  followed by bifurcation kissing simultaneous stents of the    RADIOLOGY & ADDITIONAL STUDIES:    Xray Chest 1 View- PORTABLE-Urgent (02.04.24 @ 16:31) Persistent central infiltrates remarkably similar to September 23, 2023.     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 Pt reports it was difficult today to get his newspaper outside and symptoms are interfering with QOL. Associated with dry cough. Denies fever, chills, or sputum production. Reports taking Albuterol as needed at home.   - In the ED, VS stable, SpO2 bet 90-93%. Received Lasix 40 mg IV after which pt reports improvement in symptoms. CXR: persistent central infiltrates similar to Sept. (05 Feb 2024 00:48)    Cardiology consulted for sob  Breathing improved since yesterday evening, reported a good otpt after VI lasix  No CP    MEDICATIONS  (STANDING):  aspirin enteric coated 81 milliGRAM(s) Oral daily  atorvastatin 10 milliGRAM(s) Oral at bedtime  budesonide 160 MICROgram(s)/formoterol 4.5 MICROgram(s) Inhaler 2 Puff(s) Inhalation two times a day  clopidogrel Tablet 75 milliGRAM(s) Oral daily  ferrous    sulfate 325 milliGRAM(s) Oral daily  furosemide    Tablet 40 milliGRAM(s) Oral daily  sacubitril 24 mG/valsartan 26 mG 1 Tablet(s) Oral two times a day  tiotropium 2.5 MICROgram(s) Inhaler 2 Puff(s) Inhalation daily    MEDICATIONS  (PRN):  acetaminophen     Tablet .. 650 milliGRAM(s) Oral every 6 hours PRN Mild Pain (1 - 3)  albuterol    90 MICROgram(s) HFA Inhaler 2 Puff(s) Inhalation every 6 hours PRN Shortness of Breath and/or Wheezing  aluminum hydroxide/magnesium hydroxide/simethicone Suspension 30 milliLiter(s) Oral every 4 hours PRN Dyspepsia  melatonin 3 milliGRAM(s) Oral at bedtime PRN Insomnia  ondansetron Injectable 4 milliGRAM(s) IV Push every 8 hours PRN Nausea and/or Vomiting    HOME MEDICATIONS:  aspirin 81 mg oral delayed release tablet: 1 tab(s) orally once a day (04 Feb 2024 18:09)  atorvastatin 10 mg oral tablet: 1 tab(s) orally once a day (04 Feb 2024 18:09)  cholecalciferol 25 mcg (1000 intl units) oral tablet: 1 tab(s) orally (04 Feb 2024 19:52)  Entresto 24 mg-26 mg oral tablet: 1 tab(s) orally 2 times a day (04 Feb 2024 19:52)  ferrous sulfate 325 mg (65 mg elemental iron) oral tablet: 1 tab(s) orally once a day (04 Feb 2024 19:52)    PHYSICAL EXAM:  T(C): 36.3 (05 Feb 2024 08:14), Max: 37.2 (05 Feb 2024 03:00)  T(F): 97.4 (05 Feb 2024 08:14), Max: 98.9 (05 Feb 2024 03:00)  HR: 81 (05 Feb 2024 08:14) (68 - 95)  BP: 119/50 (05 Feb 2024 08:14) (108/60 - 141/88)  BP(mean): 79 (05 Feb 2024 00:00) (71 - 103)  RR: 18 (05 Feb 2024 08:14) (15 - 30)  SpO2: 92% (05 Feb 2024 08:14) (92% - 100%)    Parameters below as of 05 Feb 2024 08:14  Patient On (Oxygen Delivery Method): room air    Constitutional: NAD, awake and alert, thin  HEENT: PERR, EOMI, Normal Hearing, MMM  Neck: Soft and supple, No LAD, No JVD  Respiratory: Breath sounds slight crackles  Cardiovascular: S1 and S2, regular rate and rhythm, no Murmurs, gallops or rubs  Gastrointestinal: Bowel Sounds present, soft, nontender, nondistended, no guarding, no rebound  Extremities: No peripheral edema  Vascular: 2+ peripheral pulses  Neurological: A/O x 3, no focal deficits  Musculoskeletal: 5/5 strength b/l upper and lower extremities  Skin: No rashes    =======================================    INTERPRETATION OF TELEMETRY: SR 80s    ECG: SR    ========================================    LABS:                        12.0   7.99  )-----------( 216      ( 05 Feb 2024 06:49 )             37.2     02-05    136  |  107  |  34<H>  ----------------------------<  115<H>  4.6   |  25  |  1.05    Ca    9.0      05 Feb 2024 06:49  Mg     2.2     02-04    TPro  7.2  /  Alb  3.2<L>  /  TBili  0.7  /  DBili  x   /  AST  23  /  ALT  32  /  AlkPhos  106  02-04    TroponinI hsT: <-22.39, <-15.02        CARDIAC TESTING:    TTE W or WO Ultrasound Enhancing Agent (09.26.23 @ 08:12) >   1. Technically difficult image quality.   2. Left ventricular systolic function is severely decreased with an ejection fraction visually estimated at 30 to 35 %. There are regional wall motion abnormalities.   3. Multiple segmental abnormalities exist. See findings.   4. Mild to moderate pulmonary hypertension.    < from: Cardiac Catheterization (09.26.23 @ 14:21) >  Successful PCI with JEFFRY to the mid LAD and CSI of proximal LAD  followed by bifurcation kissing simultaneous stents of the proximal LAD and LCx back into the LM.  DAPT for 1 year    RADIOLOGY & ADDITIONAL STUDIES:    Xray Chest 1 View- PORTABLE-Urgent (02.04.24 @ 16:31) Persistent central infiltrates remarkably similar to September 23, 2023.

## 2024-02-05 NOTE — H&P ADULT - HISTORY OF PRESENT ILLNESS
Pt is a 87 yo M with PMHx of COPD on home O2, CAD s/p stents 5 months ago on DAPT, HF from home  p/w  SOB on exertion x 3 days. Pt reports it was difficult today to get his newspaper outside and symptoms are interfering with QOL. Associated with dry cough. Denies fever, chills, or sputum production. Reports taking Albuterol as neeeded at home.     In the ED, VS stable, SpO2 bet 90-93%. Received Lasix 40 mg IV after which pt reports improvememnt in symptoms. CXR:persistent central infiltrates similar to Sept.    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 Pt reports it was difficult today to get his newspaper outside and symptoms are interfering with QOL. Associated with dry cough. Denies fever, chills, or sputum production. Reports taking Albuterol as needed at home.     In the ED, VS stable, SpO2 bet 90-93%. Received Lasix 40 mg IV after which pt reports improvement in symptoms. CXR: persistent central infiltrates similar to Sept.

## 2024-02-05 NOTE — PATIENT PROFILE ADULT - FALL HARM RISK - PATIENT NEEDS ASSISTANCE
History     Chief Complaint:    Nausea & Vomiting      The history is provided by a relative.      Jimmy Otto is a 72 year old male with history of diabetes mellitus, cerebral artery occlusion, hypertension, and hypercholesteremia who presents with nausea and vomiting. The patient's brother mentions the patient felt weak and had a decreased appetite yesterday which developed into nausea, multiple episodes of emesis, and increased weakness prompting their visit to the ED today. His brother mentions he had a subjective fever at home yesterday but denies any cough or diarrhea. In the ED, the patient denies any pain.The family is concerned for blisters on his penis which he developed 30 minutes prior to arrival. The patient lives at home with his wife but the family feels he needs more care than what they can provide. The patient's history and ROS is limited due to the baseline condition of the patient.      Review of Systems   Constitutional: Positive for fever (Subjective).   Respiratory: Negative for cough.    Gastrointestinal: Positive for nausea and vomiting. Negative for diarrhea.   All other systems reviewed and are negative.    Allergies:  No known drug allergies    Medications:    Amantadine   Amlodipine  Atenolol   Atorvastatin   Bisacodyl   Clopidogrel   Docusate sodium   Doxazosin   Liraglutide   Metformin  Ramelteon   Sennosides   Viagra    Past Medical History:    Diabetes mellitus  Hypercholesteremia  Hypertension  Cerebral artery occlusion   Stroke     Family History:    Cataract: Mother    Social History:  The patient presents to the ED with his brother.  Unable to walk at home per baseline.   Lives with his wife at home.     Physical Exam     Patient Vitals for the past 24 hrs:   BP Temp Temp src Pulse Resp SpO2 Weight   10/14/21 0131 124/73 (!) 96.7  F (35.9  C) Axillary 93 18 94 % 59.7 kg (131 lb 9.6 oz)   10/13/21 2345 122/64 -- -- 91 -- 98 % --   10/13/21 2300 -- -- -- -- -- 97 % --   10/13/21 9495  117/76 -- -- 90 -- 97 % --   10/13/21 2230 128/77 -- -- 91 -- 96 % --   10/13/21 2215 (!) 144/120 -- -- 90 -- 98 % --   10/13/21 2200 124/76 -- -- 89 -- 98 % --   10/13/21 2130 134/83 -- -- -- -- -- --   10/13/21 1954 114/70 98.6  F (37  C) Temporal 88 20 100 % --       Physical Exam    Constitutional:  Nonverbal at baseline  HENT:   Head:    Normocephalic.   Mouth/Throat:   Dry mucous membrane, grey thrush noted on his tongue. Oropharynx is clear and moist.   Eyes:    EOM are normal. Pupils are equal, round, and reactive to light.   Neck:    Neck supple.   Cardiovascular:  Normal rate, regular rhythm and normal heart sounds.      Exam reveals no gallop and no friction rub.       No murmur heard.  Pulmonary/Chest:  Effort normal and breath sounds normal.      No respiratory distress. No wheezes. No rales.      No reproducible chest wall pain.  Abdominal:   Normally anatomy, ordoñez catheter in place, no lesions or blisters. Soft. No distension. No tenderness. No rebound and no guarding.   Musculoskeletal:  Normal range of motion.   Neurological:   Nonverbal at baseline. Cranial nerves grossly intact. Right parul phoresis Skin:    No rash noted. No pallor.     Emergency Department Course   ECG  ECG taken at 2143, ECG read at 2143  Sinus rhythm with Fusion complexes   Moderate voltage criteria for LVH, may be normal variant   Junctional ST depression, probably abnormal   Abnormal ECG    Rate 77 bpm. HI interval 176 ms. QRS duration 68 ms. QT/QTc 400/452 ms. P-R-T axes 8 -24 -4.     Imaging:  XR Chest 2 Views   Final Result   IMPRESSION: No acute abnormality.      Head CT w/o contrast   Final Result   IMPRESSION:   1.  No acute intracranial process.   2.  Chronic infarcts described above.   3.  Age-related changes described above.    4.  Right posterior sphenoid sinus mild mucosal thickening versus trace air-fluid level. Please correlate for acute sinusitis.           Laboratory:  Labs Ordered and Resulted from Time of ED  Arrival Up to the Time of Departure from the ED   GLUCOSE BY METER - Abnormal; Notable for the following components:       Result Value    GLUCOSE BY METER POCT 101 (*)     All other components within normal limits   COMPREHENSIVE METABOLIC PANEL - Abnormal; Notable for the following components:    Creatinine 1.53 (*)     Calcium 10.3 (*)     Alkaline Phosphatase 158 (*)     Protein Total 9.7 (*)     Albumin 3.3 (*)     GFR Estimate 45 (*)     All other components within normal limits   ROUTINE UA WITH MICROSCOPIC REFLEX TO CULTURE - Abnormal; Notable for the following components:    Appearance Urine Slightly Cloudy (*)     Blood Urine Large (*)     Protein Albumin Urine 100  (*)     Leukocyte Esterase Urine Large (*)     Bacteria Urine Many (*)     WBC Clumps Urine Present (*)     Mucus Urine Present (*)     RBC Urine >182 (*)     WBC Urine 124 (*)     All other components within normal limits    Narrative:     Urine Culture ordered based on laboratory criteria   CBC WITH PLATELETS AND DIFFERENTIAL - Abnormal; Notable for the following components:    MCHC 30.5 (*)     All other components within normal limits   LIPASE - Abnormal; Notable for the following components:    Lipase <10 (*)     All other components within normal limits   ISTAT GASES LACTATE VENOUS POCT - Abnormal; Notable for the following components:    Lactic Acid POCT 2.4 (*)     Bicarbonate Venous POCT 31 (*)     O2 Sat, Venous POCT 33 (*)     pCO2V Venous POCT 51 (*)     pO2 Venous POCT 21 (*)     All other components within normal limits   ISTAT GASES LACTATE VENOUS POCT - Abnormal; Notable for the following components:    Lactic Acid POCT 2.2 (*)     O2 Sat, Venous POCT 53 (*)     All other components within normal limits   TROPONIN I - Normal   COVID-19 VIRUS (CORONAVIRUS) BY PCR - Normal    Narrative:     Testing was performed using the nan  SARS-CoV-2 & Influenza A/B Assay on the nan  Snow  System.  This test should be ordered for the  detection of SARS-COV-2 in individuals who meet SARS-CoV-2 clinical and/or epidemiological criteria. Test performance is unknown in asymptomatic patients.  This test is for in vitro diagnostic use under the FDA EUA for laboratories certified under CLIA to perform moderate and/or high complexity testing. This test has not been FDA cleared or approved.  A negative test does not rule out the presence of PCR inhibitors in the specimen or target RNA in concentration below the limit of detection for the assay. The possibility of a false negative should be considered if the patient's recent exposure or clinical presentation suggests COVID-19.  [facility] are certified under the Clinical Laboratory Improvement Amendments of 1988 (CLIA-88) as qualified to perform moderate and/or high complexity laboratory testing.   EXTRA BLUE TOP TUBE   EXTRA RED TOP TUBE   EXTRA PURPLE TOP TUBE   EXTRA BLOOD BANK PURPLE TOP TUBE   EXTRA BLOOD BANK PURPLE TOP TUBE   PERIPHERAL IV CATHETER   PERIPHERAL IV CATHETER   ISTAT CG4 GASES LACTATE YRN NURSING POCT   ISTAT CG4 GASES LACTATE YRN NURSING POCT   URINE CULTURE   BLOOD CULTURE   BLOOD CULTURE   CBC WITH PLATELETS & DIFFERENTIAL    Narrative:     The following orders were created for panel order CBC with platelets differential.  Procedure                               Abnormality         Status                     ---------                               -----------         ------                     CBC with platelets and d...[[id number]]  Abnormal            Final result                 Please view results for these tests on the individual orders.   EXTRA TUBE    Narrative:     The following orders were created for panel order Extra Tube.  Procedure                               Abnormality         Status                     ---------                               -----------         ------                     Extra Blood Bank Purple ...[[id number]]                       Final result                 Please view results for these tests on the individual orders.   EXTRA TUBE    Narrative:     The following orders were created for panel order Extra Tube ([place] Draw).  Procedure                               Abnormality         Status                     ---------                               -----------         ------                     Extra Blue Top Tube[[id number]]                              Final result               Extra Red Top Tube[[id number]]                               Final result               Extra Purple Top Tube[[id number]]                            Final result               Extra Blood Bank Purple ...[[id number]]                                                   Please view results for these tests on the individual orders.       Procedures:    Emergency Department Course:    Reviewed:  I reviewed nursing notes, vitals and care everywhere    Assessments:  2211 I obtained history and examined the patient as noted above.   2225 I rechecked and updated the patient and family regarding the patient's treatment plan.   0030 I rechecked and updated the patient and family regarding the patient's treatment plan. The patient and his brother consent and are comfortable with admission under the care of Dr. Marshall.         Consults:   0034 I spoke with Dr. Marshall of the hospitalist service who is in agreement to accept the patient for admission.     Interventions:  2216 Zofran, 4 mg, IV  2246 Pepcid, 20 mg, IV  2246 0.9% sodium chloride bolus, 1L, IV   0005 Lactated ringers Bolus, 1000 mL, IV  0021 Rocephin, 1 g, IV      Disposition:  The patient was admitted to the hospital under the care of Dr. Marshall.    Impression & Plan      Medical Decision Making:    Jimmy Otto is a 72 year old male recently discharged from acute rehab history of failure to thrive who is here for vomiting, dehydration, failure to thrive, per his brohter. Patient is nonvrebal but nods yes and no and is  fully awake and alert, follows commands. On examination he appearst to have clinically oral flush. Differnetial incudes dehydration, sepsis, failure to thrive secondary to oral thrush or other causes. Workup as far shows the UTI with elevated lactic acid.  This is likely due to dehydration although he is currently given fluid bolus will get a repeat lactic acid and ativax has been started. He denies abdominal pain, I would doubt surgical cause for obstruction leaving CT imaging of his abdomen. With his recent stroke, I did a CT of his head to evaluate possibility for intercranial hemorrhage which was negative. The patient will be admitted for further hydration and a speech swallow to evaluate.     Covid-19  Jimmy Otto was evaluated during a global COVID-19 pandemic, which necessitated consideration that the patient might be at risk for infection with the SARS-CoV-2 virus that causes COVID-19.   Applicable protocols for evaluation were followed during the patient's care.   COVID-19 was considered as part of the patient's evaluation.     Diagnosis:    ICD-10-CM    1. Adult failure to thrive  R62.7    2. Nausea and vomiting, intractability of vomiting not specified, unspecified vomiting type  R11.2    3. Oral thrush  B37.0    4. Dehydration  E86.0    5. Acute UTI  N39.0        Scribe Disclosure:  I, Young Bonnie, am serving as a scribe at 10:11 PM on 10/13/2021 to document services personally performed by Finn Simpson MD based on my observations and the provider's statements to me.      Finn Simpson MD  10/14/21 0602     Standing/Walking/Toileting

## 2024-02-05 NOTE — PATIENT PROFILE ADULT - FALL HARM RISK - HARM RISK INTERVENTIONS
Assistance with ambulation/Assistance OOB with selected safe patient handling equipment/Communicate Risk of Fall with Harm to all staff/Discuss with provider need for PT consult/Monitor gait and stability/Reinforce activity limits and safety measures with patient and family/Tailored Fall Risk Interventions/Visual Cue: Yellow wristband and red socks/Bed in lowest position, wheels locked, appropriate side rails in place/Call bell, personal items and telephone in reach/Instruct patient to call for assistance before getting out of bed or chair/Non-slip footwear when patient is out of bed/[place] to call system/Physically safe environment - no spills, clutter or unnecessary equipment/Purposeful Proactive Rounding/Room/bathroom lighting operational, light cord in reach

## 2024-02-05 NOTE — CONSULT NOTE ADULT - ASSESSMENT
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 Pt reported dyspnea when getting his newspaper on 2/4   Associated with dry cough which he states is unchanged from baseline  No sick contacts  RVP negative    Denies fever, chills, or sputum production. Reports taking Albuterol as needed at home.   In the ED, VS stable, SpO2 bet 90-93%. Received Lasix 40 mg IV after which pt reports improvement in symptoms.   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 Pt reported dyspnea when getting his newspaper on 2/4   Associated with dry cough which he states is unchanged from baseline  No sick contacts  RVP negative    Denies fever, chills, or sputum production. Reports taking Albuterol as needed at home.   In the ED, VS stable, SpO2 bet 90-93%. Received Lasix 40 mg IV after which pt reports improvement in symptoms.   XR suggestive of pulmonary edema  Not in a COPD Exacerbated state   Agree with diuresis  Symbicort/Spiriva  Supplemental O2  Ordered CT Chest to assess for other causes of hypoxemia such as an ILD flare/ Chronic HP?

## 2024-02-05 NOTE — PROGRESS NOTE ADULT - ASSESSMENT
87 yo M with PMHx of COPD/?ILD on home O2, CAD s/p stents 5 months ago on DAPT, HFrEF p/w SOB on exertion x 3 days. Pt reports it was difficult today to get his newspaper outside and symptoms are interfering with QOL. Associated with dry cough. Denies fever, chills, or sputum production. Reports taking Albuterol as needed at home.     #Acute on chronic hypoxic resp failure 2/2 acute on chronic HFrEF and ILD flare  -at b/l uses O2 qhs and when leaving home  -on 2L, wean as able  -trops neg,   -ESR 60, CRP pending  -imaging c/f ILD flare  -echo read pending  -felt improvement after lasix, additional dose given and then change to po  -add steroids given CT chest   -BB, entresto  -f/u pulm and cadrs recs    #COPD   -Symbicort, Spiriva, albuterol  prn     #CAD s/p stents   -continue ASA, Plavix, statin     #DVT ppx- SCDs    #full code    Medically active, updated wife at bedside.

## 2024-02-05 NOTE — H&P ADULT - ATTENDING COMMENTS
88 year old male w ILD/COPD/asthma/chroncic respiratory failure on O2 2 L nc, pHTN, CAD w NSTEMI w subsequent PCI s/p stent to mid LAD and ostial circ 09- EF 30-35%, HFrEF, remote DVT, HLD who presented to ED w cough and SOB, leg edema    ROS as above  VS revealed tachypnea  at 24  CXR similar infiltrates as seen   given lasix IV in ED w marked improvement in symptoms   my exam in detailed section of H+P above    data reviewed    #Acute on chronic resp failure w hypoxia  #HFrEF : acute on chronic  #pHTN  #ILD/COPD  good response to IV lasix  1. admit to telemetry  2. daily weight, I+Os  3. lasix  4. ECHO  5. Cardiology consult  6. supplemental O2 2 L nc/maintain sat 88-92  7. continue inhalers  8. Pulm consult      #Persistent infiltrates on CXR  suspect it relates to fluid despite ok BNP  1. CT  2. consults as above      #CAD   #hx stents s/p NSTEMI  1. DAPT  2. statin      #VTE  SCD      #Goals of care  Full code        #90 minutes

## 2024-02-05 NOTE — H&P ADULT - ASSESSMENT
Pt is a 89 yo M with PMHx of COPD on home O2, CAD s/p stents 5 months ago on DAPT, HF p/w SOB on exertion x 3 days. Pt reports it was difficult today to get his newspaper outside and symptoms are interfering with QOL. Associated with dry cough. Denies fever, chills, or sputum production. Reports taking Albuterol as neeeded at home.       #SOB on Exertion   -Multifactorial, DDx includes COPD exacerbation, PNA, D-CHF  -pt with recent increased supp O2 use, dry cough   -Symbicort, Spiriva, albuterol prn  -CXR showing persistent infiltrates similar to Sept, will get CT to visualize better   -start  Ceftriaxone and Azithromycin  to cover for CAP   -, last TTE on 9/26 with reduced LVEF 30-35% and WMA (before stents), on Entresto   -repeat TTE   -pt reports improvment of symptoms after 1 dose of IV Lasix, will continue PO, monitor UO   -pulmonary, cardiology consults   -O2 supp as needed    #CAD s/p stents   -contine ASA, Plavix, statin     #DVT PPx-ICDs    FULL Code     Pt is a 89 yo M with PMHx of COPD on home O2, CAD s/p stents 5 months ago on DAPT, HF p/w SOB on exertion x 3 days. Pt reports it was difficult today to get his newspaper outside and symptoms are interfering with QOL. Associated with dry cough. Denies fever, chills, or sputum production. Reports taking Albuterol as needed at home.         #Decompensated CHF   -last TTE on 9/26 with reduced LVEF 30-35% and WMA (before stents), on Entresto   -repeat TTE   -good response to  1 dose of IV Lasix, will continue PO, monitor UO   -strict I's and O's, daily weights  -O2 supp   -cardiology consult     #COPD   -Symbicort, Spiriva, albuterol  prn   -no CO2 retention on BVG,  stable   -maintain SpO2 bet 88-92%    #Persistent Infiltrates on CXR   -CT Chest from July with multiple ground glass opacities  -could be  chronic, will order repeat CT Chest to visualize better in setting of acute SOB on exertion   -might also be related to fluid congestion, hx of ILD  -pulmonary consult     #CAD s/p stents   continue ASA, Plavix, statin     #DVT PPx-ICDs    FULL Code     Pt is a 87 yo M with PMHx of COPD on home O2, CAD s/p stents 5 months ago on DAPT, HF p/w SOB on exertion x 3 days. Pt reports it was difficult today to get his newspaper outside and symptoms are interfering with QOL. Associated with dry cough. Denies fever, chills, or sputum production. Reports taking Albuterol as needed at home.         #Decompensated CHF   -last TTE on 9/26 with reduced LVEF 30-35% and WMA (before stents), on Entresto   -repeat TTE   -good response to  1 dose of IV Lasix, will continue PO, monitor UO   -strict I's and O's, daily weights  -O2 supp   -cardiology consult     #COPD   -Symbicort, Spiriva, albuterol  prn   -no CO2 retention on BVG,  stable   -maintain SpO2 bet 88-92%    #Persistent Infiltrates on CXR   -CT Chest from July with multiple ground glass opacities  -could be  chronic, will order repeat CT Chest to visualize better in setting of acute SOB on exertion   -might also be related to fluid congestion, hx of ILD  -check procalcitonin   -pulmonary consult     #CAD s/p stents   continue ASA, Plavix, statin     #DVT PPx-ICDs    FULL Code

## 2024-02-05 NOTE — H&P ADULT - NSHPPHYSICALEXAM_GEN_ALL_CORE
Vital Signs Last 24 Hrs  T(C): 36.9 (04 Feb 2024 20:00), Max: 36.9 (04 Feb 2024 20:00)  T(F): 98.4 (04 Feb 2024 20:00), Max: 98.4 (04 Feb 2024 20:00)  HR: 95 (04 Feb 2024 20:00) (68 - 95)  BP: 141/88 (04 Feb 2024 20:00) (112/55 - 141/88)  BP(mean): 103 (04 Feb 2024 20:00) (71 - 103)  RR: 30 (04 Feb 2024 20:00) (15 - 30)  SpO2: 95% (04 Feb 2024 20:00) (94% - 100%)    Parameters below as of 04 Feb 2024 20:00  Patient On (Oxygen Delivery Method): nasal cannula  O2 Flow (L/min): 2      General: WN/WD NAD  Head- Atraumatic, normocephalic   Neurology: A&Ox3, nonfocal  HEENT- PERRLA, moist muscous membrane  Neck-supple, no JVD  Respiratory: Rhonchi lower lobes  CVS:  S1S2, no murmurs, rubs or gallops  Abdominal: Soft, NT, ND +BS,   Genitourinary- voiding, non palpable bladder  Extremities: No edema, + peripheral pulses  Skin- no rash, no ulcer  Psychiatric- mood stable

## 2024-02-05 NOTE — CONSULT NOTE ADULT - SUBJECTIVE AND OBJECTIVE BOX
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 Pt reported dyspnea when getting his newspaper on 2/4   Associated with dry cough which he states is unchanged from baseline  No sick contacts  RVP negative    Denies fever, chills, or sputum production. Reports taking Albuterol as needed at home.   In the ED, VS stable, SpO2 bet 90-93%. Received Lasix 40 mg IV after which pt reports improvement in symptoms.   XR suggestive of pulmonary edema        PAST MEDICAL & SURGICAL HISTORY:  Squamous cell carcinoma      [place] (hard of hearing)  uses hearing aid      Arthritis      Deep vein thrombosis (DVT)  Right leg 2016 ? unknown cause      Left carpal tunnel syndrome      DVT (deep venous thrombosis)      H/O inguinal hernia repair  2017 right      H/O colonoscopy  2014      H/O squamous cell carcinoma excision  Bilateral ear 2018      S/P cataract surgery  2009 bilateral      S/P foot surgery, right  1968      S/P arthroscopy of right shoulder  2019      History of carpal tunnel surgery of right wrist  2019          PREVIOUS DIAGNOSTIC TESTING:      MEDICATIONS  (STANDING):  aspirin enteric coated 81 milliGRAM(s) Oral daily  atorvastatin 10 milliGRAM(s) Oral at bedtime  budesonide 160 MICROgram(s)/formoterol 4.5 MICROgram(s) Inhaler 2 Puff(s) Inhalation two times a day  clopidogrel Tablet 75 milliGRAM(s) Oral daily  ferrous    sulfate 325 milliGRAM(s) Oral daily  furosemide    Tablet 40 milliGRAM(s) Oral daily  sacubitril 24 mG/valsartan 26 mG 1 Tablet(s) Oral two times a day  tiotropium 2.5 MICROgram(s) Inhaler 2 Puff(s) Inhalation daily    MEDICATIONS  (PRN):  acetaminophen     Tablet .. 650 milliGRAM(s) Oral every 6 hours PRN Mild Pain (1 - 3)  albuterol    90 MICROgram(s) HFA Inhaler 2 Puff(s) Inhalation every 6 hours PRN Shortness of Breath and/or Wheezing  aluminum hydroxide/magnesium hydroxide/simethicone Suspension 30 milliLiter(s) Oral every 4 hours PRN Dyspepsia  melatonin 3 milliGRAM(s) Oral at bedtime PRN Insomnia  ondansetron Injectable 4 milliGRAM(s) IV Push every 8 hours PRN Nausea and/or Vomiting      FAMILY HISTORY:  FH: bladder cancer  father    FHx: uterine cancer  sister        SOCIAL HISTORY:  ***    REVIEW OF SYSTEM:  Pertinent items are noted in HPI.    Vital Signs Last 24 Hrs  T(C): 36.3 (05 Feb 2024 04:05), Max: 37.2 (05 Feb 2024 03:00)  T(F): 97.4 (05 Feb 2024 04:05), Max: 98.9 (05 Feb 2024 03:00)  HR: 90 (05 Feb 2024 04:05) (68 - 95)  BP: 124/56 (05 Feb 2024 04:05) (108/60 - 141/88)  BP(mean): 79 (05 Feb 2024 00:00) (71 - 103)  RR: 20 (05 Feb 2024 04:05) (15 - 30)  SpO2: 96% (05 Feb 2024 04:05) (94% - 100%)    Parameters below as of 05 Feb 2024 04:05  Patient On (Oxygen Delivery Method): nasal cannula  O2 Flow (L/min): 2      I&O's Summary    04 Feb 2024 07:01  -  05 Feb 2024 07:00  --------------------------------------------------------  IN: 0 mL / OUT: 1100 mL / NET: -1100 mL      PHYSICAL EXAM  General Appearance: cooperative, no acute distress,   HEENT: PERRL, conjunctiva clear, EOM's intact, non injected pharynx, no exudate, TM   normal  Neck: Supple, , no adenopathy, thyroid: not enlarged, no carotid bruit or JVD  Back: Symmetric, no  tenderness,no soft tissue tenderness  Lungs: no distress   Heart: Regular rate and rhythm, S1, S2 normal, no murmur, rub or gallop  Abdomen: Soft, non-tender, bowel sounds active , no hepatosplenomegaly  Extremities: no cyanosis or edema, no joint swelling  Skin: Skin color, texture normal, no rashes   Neurologic: Alert and oriented X3 , cranial nerves intact, sensory and motor normal,    ECG:    LABS:                          12.0   7.99  )-----------( 216      ( 05 Feb 2024 06:49 )             37.2     02-05    136  |  107  |  34<H>  ----------------------------<  115<H>  4.6   |  25  |  1.05    Ca    9.0      05 Feb 2024 06:49  Mg     2.2     02-04    TPro  7.2  /  Alb  3.2<L>  /  TBili  0.7  /  DBili  x   /  AST  23  /  ALT  32  /  AlkPhos  106  02-04              Urinalysis Basic - ( 05 Feb 2024 06:49 )    Color: x / Appearance: x / SG: x / pH: x  Gluc: 115 mg/dL / Ketone: x  / Bili: x / Urobili: x   Blood: x / Protein: x / Nitrite: x   Leuk Esterase: x / RBC: x / WBC x   Sq Epi: x / Non Sq Epi: x / Bacteria: x            RADIOLOGY & ADDITIONAL STUDIES:     Patient is a 88y old  Male who presents with a chief complaint of SOB on exertion (05 Feb 2024 00:48)      HPI:  Pt is a 89 yo M with PMHx of COPD/ILD on home O2, CAD s/p stents 5 months ago on DAPT, HF from home  p/w  SOB on exertion x 3 days. Pt reported dyspnea when getting his newspaper on 2/4   Associated with dry cough which he states is unchanged from baseline  No sick contacts  RVP negative    Denies fever, chills, or sputum production. Reports taking Albuterol as needed at home.   In the ED, VS stable, SpO2 bet 90-93%. Received Lasix 40 mg IV after which pt reports improvement in symptoms.   XR suggestive of pulmonary edema        PAST MEDICAL & SURGICAL HISTORY:  Squamous cell carcinoma      [place] (hard of hearing)  uses hearing aid      Arthritis      Deep vein thrombosis (DVT)  Right leg 2016 ? unknown cause      Left carpal tunnel syndrome      DVT (deep venous thrombosis)      H/O inguinal hernia repair  2017 right      H/O colonoscopy  2014      H/O squamous cell carcinoma excision  Bilateral ear 2018      S/P cataract surgery  2009 bilateral      S/P foot surgery, right  1968      S/P arthroscopy of right shoulder  2019      History of carpal tunnel surgery of right wrist  2019          PREVIOUS DIAGNOSTIC TESTING:      MEDICATIONS  (STANDING):  aspirin enteric coated 81 milliGRAM(s) Oral daily  atorvastatin 10 milliGRAM(s) Oral at bedtime  budesonide 160 MICROgram(s)/formoterol 4.5 MICROgram(s) Inhaler 2 Puff(s) Inhalation two times a day  clopidogrel Tablet 75 milliGRAM(s) Oral daily  ferrous    sulfate 325 milliGRAM(s) Oral daily  furosemide    Tablet 40 milliGRAM(s) Oral daily  sacubitril 24 mG/valsartan 26 mG 1 Tablet(s) Oral two times a day  tiotropium 2.5 MICROgram(s) Inhaler 2 Puff(s) Inhalation daily    MEDICATIONS  (PRN):  acetaminophen     Tablet .. 650 milliGRAM(s) Oral every 6 hours PRN Mild Pain (1 - 3)  albuterol    90 MICROgram(s) HFA Inhaler 2 Puff(s) Inhalation every 6 hours PRN Shortness of Breath and/or Wheezing  aluminum hydroxide/magnesium hydroxide/simethicone Suspension 30 milliLiter(s) Oral every 4 hours PRN Dyspepsia  melatonin 3 milliGRAM(s) Oral at bedtime PRN Insomnia  ondansetron Injectable 4 milliGRAM(s) IV Push every 8 hours PRN Nausea and/or Vomiting      FAMILY HISTORY:  FH: bladder cancer  father    FHx: uterine cancer  sister        SOCIAL HISTORY:  ***    REVIEW OF SYSTEM:  Pertinent items are noted in HPI.    Vital Signs Last 24 Hrs  T(C): 36.3 (05 Feb 2024 04:05), Max: 37.2 (05 Feb 2024 03:00)  T(F): 97.4 (05 Feb 2024 04:05), Max: 98.9 (05 Feb 2024 03:00)  HR: 90 (05 Feb 2024 04:05) (68 - 95)  BP: 124/56 (05 Feb 2024 04:05) (108/60 - 141/88)  BP(mean): 79 (05 Feb 2024 00:00) (71 - 103)  RR: 20 (05 Feb 2024 04:05) (15 - 30)  SpO2: 96% (05 Feb 2024 04:05) (94% - 100%)    Parameters below as of 05 Feb 2024 04:05  Patient On (Oxygen Delivery Method): nasal cannula  O2 Flow (L/min): 2      I&O's Summary    04 Feb 2024 07:01  -  05 Feb 2024 07:00  --------------------------------------------------------  IN: 0 mL / OUT: 1100 mL / NET: -1100 mL      PHYSICAL EXAM  General Appearance: cooperative, no acute distress,   HEENT: PERRL, conjunctiva clear, EOM's intact, non injected pharynx, no exudate, TM   normal  Neck: Supple, , no adenopathy, thyroid: not enlarged, no carotid bruit or JVD  Back: Symmetric, no  tenderness,no soft tissue tenderness  Lungs: no distress , wheezing is not present , mildly diminished at the bases   Heart: Regular rate and rhythm, S1, S2 normal, no murmur, rub or gallop  Abdomen: Soft, non-tender, bowel sounds active , no hepatosplenomegaly  Extremities: no cyanosis or edema, no joint swelling  Skin: Skin color, texture normal, no rashes   Neurologic: Alert and oriented X3 , cranial nerves intact, sensory and motor normal,    ECG:    LABS:                          12.0   7.99  )-----------( 216      ( 05 Feb 2024 06:49 )             37.2     02-05    136  |  107  |  34<H>  ----------------------------<  115<H>  4.6   |  25  |  1.05    Ca    9.0      05 Feb 2024 06:49  Mg     2.2     02-04    TPro  7.2  /  Alb  3.2<L>  /  TBili  0.7  /  DBili  x   /  AST  23  /  ALT  32  /  AlkPhos  106  02-04              Urinalysis Basic - ( 05 Feb 2024 06:49 )    Color: x / Appearance: x / SG: x / pH: x  Gluc: 115 mg/dL / Ketone: x  / Bili: x / Urobili: x   Blood: x / Protein: x / Nitrite: x   Leuk Esterase: x / RBC: x / WBC x   Sq Epi: x / Non Sq Epi: x / Bacteria: x            RADIOLOGY & ADDITIONAL STUDIES:

## 2024-02-05 NOTE — H&P ADULT - NSHPLABSRESULTS_GEN_ALL_CORE
< from: Xray Chest 1 View- PORTABLE-Urgent (02.04.24 @ 16:31) >    ACC: [id number] EXAM:  XR CHEST PORTABLE URGENT 1V   ORDERED BY: ANTONI GUILLEN     PROCEDURE DATE:  02/04/2024          INTERPRETATION:  AP erect chest on February 4, 2024 at 4:23 PM. Patient   has chest pain.    Elevated diaphragms crowds the chest.    Right hemidiaphragm is more elevated than the left.    Heart magnified by technique.    There are persistent central infiltrates markedly similar to September 23, 2023.    IMPRESSION: Persistent central infiltrates remarkably similar to   September 23, 2023.

## 2024-02-05 NOTE — CONSULT NOTE ADULT - ASSESSMENT
87 yo M with PMHx of COPD/ILD on home O2, CAD s/p stents 5 months ago on DAPT, HF from home  p/w  SOB on exertion x 3 days. Pt reports it was difficult today to get his newspaper outside and symptoms are interfering with QOL. Associated with dry cough. Denies fever, chills, or sputum production. Reports taking Albuterol as needed at home. In the ED, VS stable, SpO2 bet 90-93%. Received Lasix 40 mg IV after which pt reports improvement in symptoms. CXR: persistent central infiltrates similar to Sept.      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 Pt reports it was difficult today to get his newspaper outside and symptoms are interfering with QOL. Associated with dry cough. Denies fever, chills, or sputum production. Reports taking Albuterol as needed at home. In the ED, VS stable, SpO2 bet 90-93%. Received Lasix 40 mg IV after which pt reports improvement in symptoms. CXR: persistent central infiltrates similar to Sept.     #Acute on chronic respiratory distress  #Mild acute on chronic systolic HF  #CAD s/p PCI > Recently on 9/26/23: Successful PCI with JEFFRY to the mid LAD and CSI of proximal LAD followed by bifurcation kissing simultaneous stents of the proximal LAD and LCx back into the LM.   #Chronic hypoxemic respiratory failure due to COPD/ILD on home 02  #Hyperlipidemia    Monitor on tele, no events SR  Troponin negative x2. . COVID/RVP negative  CXR reviewed  Will give another dose of IV Lasix 40mg x1 now, followed by PO. Monitor outpt  Continue with GDMT for HFrEF: On Metoprolol, Entresto, Lasix  Continue aspirin, statin, Plavix  Awaiting repeat echocardiogram  Pulmonary consulted for eval for possible COPD/PNA    Case d/w Dr. Maier  Will follow

## 2024-02-05 NOTE — H&P ADULT - NSICDXPASTMEDICALHX_GEN_ALL_CORE_FT
PAST MEDICAL HISTORY:  Arthritis     Deep vein thrombosis (DVT) Right leg 2016 ? unknown cause    DVT (deep venous thrombosis)     [place] (hard of hearing) uses hearing aid    Left carpal tunnel syndrome     Squamous cell carcinoma

## 2024-02-05 NOTE — H&P ADULT - NSHPREVIEWOFSYSTEMS_GEN_ALL_CORE
REVIEW OF SYSTEMS:  CONSTITUTIONAL: No weakness, fevers or chills  EYES/ENT: No visual changes;  No vertigo or throat pain   NECK: No pain or stiffness  RESPIRATORY: + cough, + wheezing, + shortness of breath  CARDIOVASCULAR: No chest pain or palpitations  GASTROINTESTINAL: No abdominal or epigastric pain. No nausea, vomiting, or hematemesis; No diarrhea or constipation. No melena or hematochezia.  GENITOURINARY: No dysuria, frequency or hematuria  NEUROLOGICAL: No numbness or weakness  SKIN: No itching, rashes

## 2024-02-06 NOTE — DIETITIAN INITIAL EVALUATION ADULT - ORAL INTAKE PTA/DIET HISTORY
Pt lives at home w/ wife who cooks and grocery shops. Consumes 3 meals/day - likely meeting ~75% ENN chronically. Reports following low salt diet at home.

## 2024-02-06 NOTE — DIETITIAN INITIAL EVALUATION ADULT - OTHER INFO
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 Pt reports it was difficult today to get his newspaper outside and symptoms are interfering with QOL. Associated with dry cough. Adm w/ decompensated CHF, COPD.     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 Reports good appetite at present. endorses UBW of 135-138# from 1 wk ago; standing wt of 132.2# doc'd in nrsing flowsheets 2/6 and 136# 2/5 - ? accuracy of wts obtained 2/2 discrepancies; RD unable to obtain bed scale wt 2/2 pt in chair. NFPE reveals severe muscle/fat wasting - appears thin, bony, frail. Discussed ONS options w/ pt - Recommend high-protein chocolate mousse (394kcal, 14g protein) daily to help meet ENN. Recommend liberalize diet to low sodium ONLY to maximize caloric and nutrient intake. See below for further recommendations.

## 2024-02-06 NOTE — DISCHARGE NOTE NURSING/CASE MANAGEMENT/SOCIAL WORK - PATIENT PORTAL LINK FT
You can access the FollowMyHealth Patient Portal offered by [facility] by registering at the following website: [email or web address]. By joining [facility]’s FollowMyHealth portal, you will also be able to view your health information using other applications (apps) compatible with our system.

## 2024-02-06 NOTE — DIETITIAN INITIAL EVALUATION ADULT - PERTINENT LABORATORY DATA
02-06    137  |  108  |  62<H>  ----------------------------<  167<H>  4.2   |  21<L>  |  1.61<H>    Ca    8.9      06 Feb 2024 08:15  Mg     2.2     02-04    TPro  7.2  /  Alb  3.2<L>  /  TBili  0.7  /  DBili  x   /  AST  23  /  ALT  32  /  AlkPhos  106  02-04

## 2024-02-06 NOTE — PROGRESS NOTE ADULT - ASSESSMENT
1) HF  2) COPD  3) Abnormal XR  4) Hypoxemia  5) Dyspnea    87 yo M with PMHx of COPD/ILD on home O2, CAD s/p stents 5 months ago on DAPT, HF from home  p/w  SOB on exertion x 3 days. Pt reported dyspnea when getting his newspaper on 2/4   Associated with dry cough which he states is unchanged from baseline  No sick contacts  RVP negative    Denies fever, chills, or sputum production. Reports taking Albuterol as needed at home.   In the ED, VS stable, SpO2 bet 90-93%. Received Lasix 40 mg IV after which pt reports improvement in symptoms.   XR suggestive of pulmonary edema  acute exacerbation of pulm fibrosis likely dx  Agree with diuresis  Symbicort/Spiriva  Supplemental O2  overall presentation is suggestive of acute exacerbation of pulm fibrosis  iv solumedrol added will monitor  fu cultures

## 2024-02-06 NOTE — PROGRESS NOTE ADULT - ASSESSMENT
89 yo M with PMHx of COPD/?ILD on home O2, CAD s/p stents 5 months ago on DAPT, HFrEF p/w SOB on exertion x 3 days. Pt reports it was difficult today to get his newspaper outside and symptoms are interfering with QOL. Associated with dry cough. Denies fever, chills, or sputum production. Reports taking Albuterol as needed at home.     #Acute on chronic hypoxic resp failure 2/2 acute on chronic HFrEF and ILD flare  -at b/l uses O2 qhs and when leaving home  -on 2L, wean as able  -trops neg,   -ESR 60, CRP 91  -imaging c/f ILD flare  -echo as above  -felt improvement after lasix, additional dose given and then change to po for today  -added steroids given CT chest   -BB, entresto (held given KYRA)  -f/u pulm and cards recs    #KYRA  -trend  -in setting of diuresis likely  -bladder scan neg  -hold entresto  -continue po diuresis    #COPD   -Symbicort, Spiriva, albuterol  prn     #CAD s/p stents   -continue ASA, Plavix, statin     #DVT ppx- SCDs    #full code    Medically active, updated wife at bedside on 2/5.  D/c 2-3 days.

## 2024-02-06 NOTE — DIETITIAN INITIAL EVALUATION ADULT - RD TO REMAIN AVAILABLE
First balloon inflation max pressure = 14 tomasz. First balloon inflation duration = 20 seconds. yes

## 2024-02-06 NOTE — DIETITIAN INITIAL EVALUATION ADULT - NSICDXPASTMEDICALHX_GEN_ALL_CORE_FT
PAST MEDICAL HISTORY:  Arthritis     Deep vein thrombosis (DVT) Right leg 2016 ? unknown cause    DVT (deep venous thrombosis)     [place] (hard of hearing) uses hearing aid    Left carpal tunnel syndrome     Squamous cell carcinoma

## 2024-02-06 NOTE — PROGRESS NOTE ADULT - ASSESSMENT
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 Pt reports it was difficult today to get his newspaper outside and symptoms are interfering with QOL. Associated with dry cough. Denies fever, chills, or sputum production. Reports taking Albuterol as needed at home. In the ED, VS stable, SpO2 bet 90-93%. Received Lasix 40 mg IV after which pt reports improvement in symptoms. CXR: persistent central infiltrates similar to Sept.     #Acute on chronic respiratory distress  #Mild acute on chronic systolic HF - EF recovered now 55%  #CAD s/p PCI > Recently on 9/26/23: Successful PCI with JEFFRY to the mid LAD and CSI of proximal LAD followed by bifurcation kissing simultaneous stents of the proximal LAD and LCx back into the LM.   #Chronic hypoxemic respiratory failure due to COPD/ILD on home 02  #Hyperlipidemia    Monitor on tele, no events SR  Troponin negative x2. . COVID/RVP negative  CXR reviewed  C/w PO Lasix  Continue with GDMT for HFrEF: On Metoprolol, Entresto, Lasix  Continue aspirin, statin, Plavix  Echo reviewed. EF recovered 55%  IV steroids per primary team    Case d/w Dr. Gramajo  Will sign off, call w/ questions.     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 Pt reports it was difficult today to get his newspaper outside and symptoms are interfering with QOL. Associated with dry cough. Denies fever, chills, or sputum production. Reports taking Albuterol as needed at home. In the ED, VS stable, SpO2 bet 90-93%. Received Lasix 40 mg IV after which pt reports improvement in symptoms. CXR: persistent central infiltrates similar to Sept.     #Acute on chronic respiratory distress  #Mild acute on chronic systolic HF - EF recovered now 55%  #CAD s/p PCI > Recently on 9/26/23: Successful PCI with JEFFRY to the mid LAD and CSI of proximal LAD followed by bifurcation kissing simultaneous stents of the proximal LAD and LCx back into the LM.   #Chronic hypoxemic respiratory failure due to COPD/ILD on home 02  #Hyperlipidemia    Monitor on tele, no events SR  Troponin negative x2. . COVID/RVP negative  CXR reviewed  C/w PO Lasix  Continue with GDMT for HFrEF: On Metoprolol, Entresto, Lasix  Continue aspirin, statin, Plavix  Echo reviewed. EF recovered 55%  IV steroids per primary team    Will sign off, call w/ questions.

## 2024-02-06 NOTE — DIETITIAN INITIAL EVALUATION ADULT - ADD RECOMMEND
1. Recommend liberalize diet to low sodium ONLY to maximize caloric and nutrient intake.   2. Recommend add high-protein chocolate mousse (394kcal, 14g protein).  3. Consider adding thiamine 100 mg daily 2/2 poor PO intake/ malnutrition   4. Recommend MVI w/ minerals daily to ensure 100% RDA met   5. Please obtain daily weights 2/2 CHF  6. Encourage protein-rich foods, maximize food preferences   7. Monitor bowel movements, if no BM for >3 days, consider implementing bowel regimen.   8. Obtain vitamin D 25OH level to assess nutriture   9. Confirm goals of care regarding nutrition support   RD will continue to monitor PO intake, labs, hydration, and wt prn.

## 2024-02-06 NOTE — DIETITIAN INITIAL EVALUATION ADULT - PERTINENT MEDS FT
MEDICATIONS  (STANDING):  aspirin enteric coated 81 milliGRAM(s) Oral daily  atorvastatin 10 milliGRAM(s) Oral at bedtime  budesonide 160 MICROgram(s)/formoterol 4.5 MICROgram(s) Inhaler 2 Puff(s) Inhalation two times a day  clopidogrel Tablet 75 milliGRAM(s) Oral daily  ferrous    sulfate 325 milliGRAM(s) Oral daily  furosemide    Tablet 40 milliGRAM(s) Oral daily  methylPREDNISolone sodium succinate Injectable 40 milliGRAM(s) IV Push every 12 hours  metoprolol succinate ER 12.5 milliGRAM(s) Oral daily  tiotropium 2.5 MICROgram(s) Inhaler 2 Puff(s) Inhalation daily    MEDICATIONS  (PRN):  acetaminophen     Tablet .. 650 milliGRAM(s) Oral every 6 hours PRN Mild Pain (1 - 3)  albuterol    90 MICROgram(s) HFA Inhaler 2 Puff(s) Inhalation every 6 hours PRN Shortness of Breath and/or Wheezing  aluminum hydroxide/magnesium hydroxide/simethicone Suspension 30 milliLiter(s) Oral every 4 hours PRN Dyspepsia  melatonin 3 milliGRAM(s) Oral at bedtime PRN Insomnia  ondansetron Injectable 4 milliGRAM(s) IV Push every 8 hours PRN Nausea and/or Vomiting

## 2024-02-07 NOTE — PROGRESS NOTE ADULT - NUTRITIONAL ASSESSMENT
This patient has been assessed with a concern for Malnutrition and has been determined to have a diagnosis/diagnoses of Severe protein-calorie malnutrition.    This patient is being managed with:   Diet DASH/TLC-  Sodium & Cholesterol Restricted  Entered: Feb 5 2024 12:24AM    The following pending diet order is being considered for treatment of Severe protein-calorie malnutrition:  Diet Regular-  Low Sodium  Entered: Feb 6 2024  2:56PM  
This patient has been assessed with a concern for Malnutrition and has been determined to have a diagnosis/diagnoses of Severe protein-calorie malnutrition.    This patient is being managed with:   Diet DASH/TLC-  Sodium & Cholesterol Restricted  Entered: Feb 5 2024 12:24AM    The following pending diet order is being considered for treatment of Severe protein-calorie malnutrition:  Diet Regular-  Low Sodium  Entered: Feb 6 2024  2:56PM

## 2024-02-07 NOTE — PROGRESS NOTE ADULT - TIME BILLING
Time spent  coordinating the patient's care. This includes reviewing documentation pertinent to this admission, results and imaging. Further tests, medications, and procedures have been ordered as indicated. Laboratory results and the plan of care were communicated to the patient. Discussed care plan with consultants including pulm. Supporting documentation was completed and added to the patient's chart.
Time spent  coordinating the patient's care. This includes reviewing documentation pertinent to this admission, results and imaging. Further tests, medications, and procedures have been ordered as indicated. Laboratory results and the plan of care were communicated to the patient and wife. Discussed care plan with consultants including . Supporting documentation was completed and added to the patient's chart.
Time spent  coordinating the patient's care. This includes reviewing documentation pertinent to this admission, results and imaging. Further tests, medications, and procedures have been ordered as indicated. Laboratory results and the plan of care were communicated to the patient and wife. Discussed care plan with consultants including pulm. Supporting documentation was completed and added to the patient's chart.

## 2024-02-07 NOTE — PROGRESS NOTE ADULT - ASSESSMENT
87 yo M with PMHx of COPD/?ILD on home O2, CAD s/p stents 5 months ago on DAPT, HFrEF p/w SOB on exertion x 3 days. Pt reports it was difficult today to get his newspaper outside and symptoms are interfering with QOL. Associated with dry cough. Denies fever, chills, or sputum production. Reports taking Albuterol as needed at home.     #Acute on chronic hypoxic resp failure 2/2 acute on chronic HFrEF and ILD flare  -at b/l uses O2 qhs and when leaving home  -on 2L, wean as able  -trops neg,   -ESR 60, CRP 91  -imaging c/f ILD flare  -echo as above  -felt improvement after lasix, additional dose given and then changed to po (now held given hypotension)  -added steroids given CT chest   -BB (held given hypotension), entresto (held given KYRA)  -f/u pulm and cards recs    #Borderline hypotension  -hold BB, entresto, lasix    #KYRA  -trend, improving  -in setting of diuresis likely  -bladder scan neg  -hold entresto  -diuresis new held given hyotension    #COPD   -Symbicort, Spiriva, albuterol  prn     #CAD s/p stents   -continue ASA, Plavix, statin     #DVT ppx- SCDs    #full code    Medically active, updated wife at bedside on 2/7.  D/c 1-2 days. F2F on d/c

## 2024-02-07 NOTE — PROGRESS NOTE ADULT - ASSESSMENT
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 Pt reported dyspnea when getting his newspaper on 2/4   Associated with dry cough which he states is unchanged from baseline  No sick contacts  RVP negative    Denies fever, chills, or sputum production. Reports taking Albuterol as needed at home.   In the ED, VS stable, SpO2 bet 90-93%. Received Lasix 40 mg IV after which pt reports improvement in symptoms.   XR suggestive of pulmonary edema  acute exacerbation of pulm fibrosis likely dx  Agree with diuresis  Symbicort/Spiriva  Supplemental O2  overall presentation is suggestive of acute exacerbation of pulm fibrosis  iv solumedrol added will monitor  fu cultures

## 2024-02-08 NOTE — DISCHARGE NOTE PROVIDER - HOSPITAL COURSE
CC: SOB  HPI and Hospital Course:  87 yo M with PMHx of COPD/?ILD on home O2, CAD s/p stents 5 months ago on DAPT, HFrEF p/w SOB on exertion x 3 days. Pt reports it was difficult today to get his newspaper outside and symptoms are interfering with QOL. Associated with dry cough. Denies fever, chills, or sputum production. Reports taking Albuterol as needed at home.     Acute on chronic hypoxic resp failure 2/2 acute on chronic HFrEF and ILD (PF) flare. S/p diuresis and iv steroids, d/c on prednisone taper. Also w KYRA, improving at time of d/c.    D/c to home, F2F.  I have spent  min on day of d/c coordinating care.  See progress note for problem based plan.

## 2024-02-08 NOTE — PROGRESS NOTE ADULT - ASSESSMENT
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 Pt reports it was difficult today to get his newspaper outside and symptoms are interfering with QOL. Associated with dry cough. Denies fever, chills, or sputum production. Reports taking Albuterol as needed at home. In the ED, VS stable, SpO2 bet 90-93%. Received Lasix 40 mg IV after which pt reports improvement in symptoms. CXR: persistent central infiltrates similar to Sept.     #Acute on chronic respiratory distress  #Mild acute on chronic systolic HF - EF recovered now 55%  #CAD s/p PCI > Recently on 9/26/23: Successful PCI with JEFFRY to the mid LAD and CSI of proximal LAD followed by bifurcation kissing simultaneous stents of the proximal LAD and LCx back into the LM.   #Chronic hypoxemic respiratory failure due to COPD/ILD on home 02  #Hyperlipidemia    Monitor on tele, no events SR  Troponin negative x2. . COVID/RVP negative  CXR reviewed  Unable to tolerate BB and Lasix.   Will c/w Entresto only.  Continue aspirin, statin, Plavix  Echo reviewed. EF recovered 55%  IV steroids per primary team    Will sign off, call w/ questions.  Close outpatient follow up

## 2024-02-08 NOTE — DISCHARGE NOTE PROVIDER - NSDCMRMEDTOKEN_GEN_ALL_CORE_FT
aspirin 81 mg oral delayed release tablet: 1 tab(s) orally once a day  atorvastatin 10 mg oral tablet: 1 tab(s) orally once a day  budesonide-formoterol 160 mcg-4.5 mcg/inh inhalation aerosol: 2 puff(s) inhaled 2 times a day  cholecalciferol 25 mcg (1000 intl units) oral tablet: 1 tab(s) orally  clopidogrel 75 mg oral tablet: 1 tab(s) orally once a day  Entresto 24 mg-26 mg oral tablet: 1 tab(s) orally 2 times a day  ferrous sulfate 325 mg (65 mg elemental iron) oral tablet: 1 tab(s) orally once a day  predniSONE 10 mg oral tablet: 1 tab(s) orally once a day Take 4 tabs daily for 3 days, then 3 tabs daily for 3 days, then 2 tabs daily for 3 days, then 1 tab daily for 3 days then stop.  tiotropium 2.5 mcg/inh inhalation aerosol: 2 puff(s) inhaled once a day

## 2024-02-08 NOTE — DISCHARGE NOTE PROVIDER - NSDCCPCAREPLAN_GEN_ALL_CORE_FT
PRINCIPAL DISCHARGE DIAGNOSIS  Diagnosis: Dyspnea on exertion  Assessment and Plan of Treatment: In setting of CHF and ILD (pulmonary fibrosis) flare. Take prednisone taper as prescribed. Follow up with Dr. Mckenzie in one week.

## 2024-02-08 NOTE — PROGRESS NOTE ADULT - REASON FOR ADMISSION
SOB on exertion

## 2024-02-08 NOTE — DISCHARGE NOTE PROVIDER - NSDCPNSUBOBJ_GEN_ALL_CORE
VITALS:  T(F): 97.6 (02-08-24 @ 07:38), Max: 98 (02-07-24 @ 19:57)  HR: 64 (02-08-24 @ 07:38) (64 - 87)  BP: 105/48 (02-08-24 @ 07:38) (105/48 - 130/48)  RR: 18 (02-08-24 @ 07:38) (18 - 18)  SpO2: 100% (02-08-24 @ 10:36) (93% - 100%)    PHYSICAL EXAM:  Gen: NAD  HEENT:  pupils equal and reactive, EOMI, no oropharyngeal lesions, erythema, exudates, oral thrush  NECK:   supple, no carotid bruits, no palpable lymph nodes, no thyromegaly  CV:  +S1, +S2, regular, no murmurs or rubs  RESP:   lungs clear to auscultation bilaterally, no wheezing, rales, rhonchi, good air entry bilaterally  BREAST:  not examined  GI:  abdomen soft, non-tender, non-distended, normal BS, no bruits, no abdominal masses, no palpable masses  RECTAL:  not examined  :  not examined  MSK:   normal muscle tone, no atrophy, no rigidity, no contractions  EXT:  no clubbing, no cyanosis, no edema, no calf pain, swelling or erythema  VASCULAR:  pulses equal and symmetric in the upper and lower extremities  NEURO:  AAOX3, no focal neurological deficits, follows all commands, able to move extremities spontaneously  SKIN:  no ulcers, lesions or rashes    #Acute on chronic hypoxic resp failure 2/2 acute on chronic HFrEF and ILD flare  -at b/l uses O2 qhs and when leaving home  -on 2L, wean as able, still needs for ambulation.  -trops neg,   -ESR 60, CRP 91  -imaging c/f ILD flare  -echo as above  -felt improvement after lasix, additional dose given and then changed to po (now held given hypotension)  -added steroids given CT chest, for prednisone taper on d/c  -BB (held given hypotension), entresto (held given KYRA)- now resume entresto on d/c  -f/u pulm and cards recs    #Borderline hypotension  -hold BB, further diuresis  -entresto ok to resume on d/c    #KYRA  -trend, improving  -in setting of diuresis likely  -bladder scan neg  -held entresto, resume on d/c  -diuresis new held given hyotension    #COPD   -Symbicort, Spiriva, albuterol  prn     #CAD s/p stents   -continue ASA, Plavix, statin     #DVT ppx- SCDs    #full code    Updated wife at bedside on 2/7.  F2F on d/c

## 2024-02-08 NOTE — PROGRESS NOTE ADULT - SUBJECTIVE AND OBJECTIVE BOX
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 Pt reported dyspnea when getting his newspaper on 2/4   Associated with dry cough which he states is unchanged from baseline  No sick contacts  RVP negative    Denies fever, chills, or sputum production. Reports taking Albuterol as needed at home.   In the ED, VS stable, SpO2 bet 90-93%. Received Lasix 40 mg IV after which pt reports improvement in symptoms.   XR suggestive of pulmonary edema        PAST MEDICAL & SURGICAL HISTORY:  Squamous cell carcinoma      [place] (hard of hearing)  uses hearing aid      Arthritis      Deep vein thrombosis (DVT)  Right leg 2016 ? unknown cause      Left carpal tunnel syndrome      DVT (deep venous thrombosis)      H/O inguinal hernia repair  2017 right      H/O colonoscopy  2014      H/O squamous cell carcinoma excision  Bilateral ear 2018      S/P cataract surgery  2009 bilateral      S/P foot surgery, right  1968      S/P arthroscopy of right shoulder  2019      History of carpal tunnel surgery of right wrist  2019          MEDICATIONS  (STANDING):  aspirin enteric coated 81 milliGRAM(s) Oral daily  atorvastatin 10 milliGRAM(s) Oral at bedtime  budesonide 160 MICROgram(s)/formoterol 4.5 MICROgram(s) Inhaler 2 Puff(s) Inhalation two times a day  clopidogrel Tablet 75 milliGRAM(s) Oral daily  ferrous    sulfate 325 milliGRAM(s) Oral daily  furosemide    Tablet 40 milliGRAM(s) Oral daily  methylPREDNISolone sodium succinate Injectable 40 milliGRAM(s) IV Push every 12 hours  metoprolol succinate ER 12.5 milliGRAM(s) Oral daily  tiotropium 2.5 MICROgram(s) Inhaler 2 Puff(s) Inhalation daily      MEDICATIONS  (PRN):  acetaminophen     Tablet .. 650 milliGRAM(s) Oral every 6 hours PRN Mild Pain (1 - 3)  albuterol    90 MICROgram(s) HFA Inhaler 2 Puff(s) Inhalation every 6 hours PRN Shortness of Breath and/or Wheezing  aluminum hydroxide/magnesium hydroxide/simethicone Suspension 30 milliLiter(s) Oral every 4 hours PRN Dyspepsia  melatonin 3 milliGRAM(s) Oral at bedtime PRN Insomnia  ondansetron Injectable 4 milliGRAM(s) IV Push every 8 hours PRN Nausea and/or Vomiting      FAMILY HISTORY:  FH: bladder cancer  father    FHx: uterine cancer  sister        Vital Signs Last 24 Hrs  T(C): 36.5 (06 Feb 2024 20:14), Max: 36.5 (06 Feb 2024 20:14)  T(F): 97.7 (06 Feb 2024 20:14), Max: 97.7 (06 Feb 2024 20:14)  HR: 72 (06 Feb 2024 20:14) (72 - 96)  BP: 97/43 (06 Feb 2024 20:14) (97/43 - 97/43)  BP(mean): --  RR: 18 (06 Feb 2024 20:14) (18 - 18)  SpO2: 98% (06 Feb 2024 20:14) (98% - 98%)    Parameters below as of 06 Feb 2024 20:44  Patient On (Oxygen Delivery Method): nasal cannula        PHYSICAL EXAM  General Appearance: cooperative, no acute distress,   HEENT: PERRL, conjunctiva clear, EOM's intact, non injected pharynx, no exudate, TM   normal  Neck: Supple, , no adenopathy, thyroid: not enlarged, no carotid bruit or JVD  Back: Symmetric, no  tenderness,no soft tissue tenderness  Lungs: no distress , wheezing is not present , mildly diminished at the bases   velcro type crackles 1/3 way up  Heart: Regular rate and rhythm, S1, S2 normal, no murmur, rub or gallop  Abdomen: Soft, non-tender, bowel sounds active , no hepatosplenomegaly  Extremities: no cyanosis or edema, no joint swelling  Skin: Skin color, texture normal, no rashes   Neurologic: Alert and oriented X3 , cranial nerves intact, sensory and motor normal,    ECG:    LABS:                          12.0   7.99  )-----------( 216      ( 05 Feb 2024 06:49 )             37.2     02-05    136  |  107  |  34<H>  ----------------------------<  115<H>  4.6   |  25  |  1.05    Ca    9.0      05 Feb 2024 06:49  Mg     2.2     02-04    TPro  7.2  /  Alb  3.2<L>  /  TBili  0.7  /  DBili  x   /  AST  23  /  ALT  32  /  AlkPhos  106  02-04              Urinalysis Basic - ( 05 Feb 2024 06:49 )    Color: x / Appearance: x / SG: x / pH: x  Gluc: 115 mg/dL / Ketone: x  / Bili: x / Urobili: x   Blood: x / Protein: x / Nitrite: x   Leuk Esterase: x / RBC: x / WBC x   Sq Epi: x / Non Sq Epi: x / Bacteria: x            RADIOLOGY & ADDITIONAL STUDIES:    
HOSPITALIST ATTENDING PROGRESS NOTE    Chart and meds reviewed.  Patient seen and examined.    CC: SOB    Subjective: Reports feeling better, walking in hallway w O2 comfortably. Updated wife.    All other systems reviewed and found to be negative with the exception of what has been described above.    MEDICATIONS  (STANDING):  aspirin enteric coated 81 milliGRAM(s) Oral daily  atorvastatin 10 milliGRAM(s) Oral at bedtime  budesonide 160 MICROgram(s)/formoterol 4.5 MICROgram(s) Inhaler 2 Puff(s) Inhalation two times a day  clopidogrel Tablet 75 milliGRAM(s) Oral daily  ferrous    sulfate 325 milliGRAM(s) Oral daily  methylPREDNISolone sodium succinate Injectable 40 milliGRAM(s) IV Push every 12 hours  tiotropium 2.5 MICROgram(s) Inhaler 2 Puff(s) Inhalation daily    MEDICATIONS  (PRN):  acetaminophen     Tablet .. 650 milliGRAM(s) Oral every 6 hours PRN Mild Pain (1 - 3)  albuterol    90 MICROgram(s) HFA Inhaler 2 Puff(s) Inhalation every 6 hours PRN Shortness of Breath and/or Wheezing  aluminum hydroxide/magnesium hydroxide/simethicone Suspension 30 milliLiter(s) Oral every 4 hours PRN Dyspepsia  melatonin 3 milliGRAM(s) Oral at bedtime PRN Insomnia  ondansetron Injectable 4 milliGRAM(s) IV Push every 8 hours PRN Nausea and/or Vomiting      VITALS:  T(F): 97 (02-07-24 @ 07:10), Max: 97.7 (02-06-24 @ 20:14)  HR: 77 (02-07-24 @ 07:10) (72 - 77)  BP: 90/42 (02-07-24 @ 07:10) (90/42 - 97/43)  RR: 18 (02-07-24 @ 07:10) (18 - 18)  SpO2: 95% (02-07-24 @ 07:10) (95% - 98%)  Wt(kg): --    I&O's Summary      CAPILLARY BLOOD GLUCOSE          PHYSICAL EXAM:  Gen: NAD  HEENT:  pupils equal and reactive, EOMI, no oropharyngeal lesions, erythema, exudates, oral thrush  NECK:   supple, no carotid bruits, no palpable lymph nodes, no thyromegaly  CV:  +S1, +S2, regular, no murmurs or rubs  RESP:   lungs clear to auscultation bilaterally, no wheezing, rales, rhonchi, good air entry bilaterally  BREAST:  not examined  GI:  abdomen soft, non-tender, non-distended, normal BS, no bruits, no abdominal masses, no palpable masses  RECTAL:  not examined  :  not examined  MSK:   normal muscle tone, no atrophy, no rigidity, no contractions  EXT:  no clubbing, no cyanosis, no edema, no calf pain, swelling or erythema  VASCULAR:  pulses equal and symmetric in the upper and lower extremities  NEURO:  AAOX3, no focal neurological deficits, follows all commands, able to move extremities spontaneously  SKIN:  no ulcers, lesions or rashes    LABS:        02-07    133<L>  |  107  |  72<H>  ----------------------------<  163<H>  4.3   |  21<L>  |  1.44<H>    Ca    8.4<L>      07 Feb 2024 07:37              Urinalysis Basic - ( 07 Feb 2024 07:37 )    Color: x / Appearance: x / SG: x / pH: x  Gluc: 163 mg/dL / Ketone: x  / Bili: x / Urobili: x   Blood: x / Protein: x / Nitrite: x   Leuk Esterase: x / RBC: x / WBC x   Sq Epi: x / Non Sq Epi: x / Bacteria: x    CULTURES:  RVP neg    Additional results/Imaging, I have personally reviewed:  CXR 2/4/24: Persistent central infiltrates remarkably similar to September 23, 2023.    CT chest noncon 2/5/24: Findings of interstitial lung disease (peripheral reticulation and bronchiectasis) bilaterally. New confluent ground-glass opacification at the lung apices and throughout the left lower lobe; differential diagnosis includes infection or acute exacerbation of ILD. Recommend CT chest follow-up in one month to ensure clearing.    Echo 2/5/24: Estimated left ventricular ejection fraction is 55 %. The left ventricle is normal in size, wall motion and contractility as seen in limited views. Mild concentric left ventricular hypertrophy is present. The left atrium is mildly dilated. The aortic valve is well visualized, appears mildly calcified. Valve opening seems to be normal. Mild to Moderate aortic regurgitation is present. There is calcification of both mitral valve leaflets. The leaflet opening is normal. Moderate mitral annular calcification is present. Mild to Moderate mitral regurgitation is present. EA reversal of the mitral inflow consistent with reduced compliance of the left ventricle. Normal appearing tricuspid valve structure. Moderate (2+) tricuspid valve regurgitation is present. Mild pulmonary hypertension. Normal appearing pulmonic valve structure. Mild pulmonic valvular regurgitation (1+) is present.    Telemetry, personally reviewed:  2/5/24: sinus 80-100s  2/6/24: sinus , d/c tele        
Patient is a 88y old  Male who presents with a chief complaint of SOB on exertion (05 Feb 2024 00:48)      HPI:  Pt is a 87 yo M with PMHx of COPD/ILD on home O2, CAD s/p stents 5 months ago on DAPT, HF from home  p/w  SOB on exertion x 3 days. Pt reported dyspnea when getting his newspaper on 2/4   Associated with dry cough which he states is unchanged from baseline  No sick contacts  RVP negative    Denies fever, chills, or sputum production. Reports taking Albuterol as needed at home.   In the ED, VS stable, SpO2 bet 90-93%. Received Lasix 40 mg IV after which pt reports improvement in symptoms.   XR suggestive of pulmonary edema        PAST MEDICAL & SURGICAL HISTORY:  Squamous cell carcinoma      [place] (hard of hearing)  uses hearing aid      Arthritis      Deep vein thrombosis (DVT)  Right leg 2016 ? unknown cause      Left carpal tunnel syndrome      DVT (deep venous thrombosis)      H/O inguinal hernia repair  2017 right      H/O colonoscopy  2014      H/O squamous cell carcinoma excision  Bilateral ear 2018      S/P cataract surgery  2009 bilateral      S/P foot surgery, right  1968      S/P arthroscopy of right shoulder  2019      History of carpal tunnel surgery of right wrist  2019          PREVIOUS DIAGNOSTIC TESTING:      MEDICATIONS  (STANDING):  aspirin enteric coated 81 milliGRAM(s) Oral daily  atorvastatin 10 milliGRAM(s) Oral at bedtime  budesonide 160 MICROgram(s)/formoterol 4.5 MICROgram(s) Inhaler 2 Puff(s) Inhalation two times a day  clopidogrel Tablet 75 milliGRAM(s) Oral daily  ferrous    sulfate 325 milliGRAM(s) Oral daily  furosemide    Tablet 40 milliGRAM(s) Oral daily  methylPREDNISolone sodium succinate Injectable 40 milliGRAM(s) IV Push every 12 hours  metoprolol succinate ER 12.5 milliGRAM(s) Oral daily  sacubitril 24 mG/valsartan 26 mG 1 Tablet(s) Oral two times a day  tiotropium 2.5 MICROgram(s) Inhaler 2 Puff(s) Inhalation daily      MEDICATIONS  (PRN):  acetaminophen     Tablet .. 650 milliGRAM(s) Oral every 6 hours PRN Mild Pain (1 - 3)  albuterol    90 MICROgram(s) HFA Inhaler 2 Puff(s) Inhalation every 6 hours PRN Shortness of Breath and/or Wheezing  aluminum hydroxide/magnesium hydroxide/simethicone Suspension 30 milliLiter(s) Oral every 4 hours PRN Dyspepsia  melatonin 3 milliGRAM(s) Oral at bedtime PRN Insomnia  ondansetron Injectable 4 milliGRAM(s) IV Push every 8 hours PRN Nausea and/or Vomiting      FAMILY HISTORY:  FH: bladder cancer  father    FHx: uterine cancer  sister        Vital Signs Last 24 Hrs  T(C): 36.8 (05 Feb 2024 20:20), Max: 36.8 (05 Feb 2024 20:20)  T(F): 98.2 (05 Feb 2024 20:20), Max: 98.2 (05 Feb 2024 20:20)  HR: 73 (05 Feb 2024 20:20) (73 - 90)  BP: 102/47 (05 Feb 2024 20:20) (102/47 - 119/50)  BP(mean): --  RR: 18 (05 Feb 2024 20:20) (18 - 18)  SpO2: 99% (05 Feb 2024 20:20) (92% - 99%)    Parameters below as of 05 Feb 2024 20:48  Patient On (Oxygen Delivery Method): nasal cannula        I&O's Summary    04 Feb 2024 07:01  -  05 Feb 2024 07:00  --------------------------------------------------------  IN: 0 mL / OUT: 1100 mL / NET: -1100 mL      PHYSICAL EXAM  General Appearance: cooperative, no acute distress,   HEENT: PERRL, conjunctiva clear, EOM's intact, non injected pharynx, no exudate, TM   normal  Neck: Supple, , no adenopathy, thyroid: not enlarged, no carotid bruit or JVD  Back: Symmetric, no  tenderness,no soft tissue tenderness  Lungs: no distress , wheezing is not present , mildly diminished at the bases   velcro type crackles 1/3 way up  Heart: Regular rate and rhythm, S1, S2 normal, no murmur, rub or gallop  Abdomen: Soft, non-tender, bowel sounds active , no hepatosplenomegaly  Extremities: no cyanosis or edema, no joint swelling  Skin: Skin color, texture normal, no rashes   Neurologic: Alert and oriented X3 , cranial nerves intact, sensory and motor normal,    ECG:    LABS:                          12.0   7.99  )-----------( 216      ( 05 Feb 2024 06:49 )             37.2     02-05    136  |  107  |  34<H>  ----------------------------<  115<H>  4.6   |  25  |  1.05    Ca    9.0      05 Feb 2024 06:49  Mg     2.2     02-04    TPro  7.2  /  Alb  3.2<L>  /  TBili  0.7  /  DBili  x   /  AST  23  /  ALT  32  /  AlkPhos  106  02-04              Urinalysis Basic - ( 05 Feb 2024 06:49 )    Color: x / Appearance: x / SG: x / pH: x  Gluc: 115 mg/dL / Ketone: x  / Bili: x / Urobili: x   Blood: x / Protein: x / Nitrite: x   Leuk Esterase: x / RBC: x / WBC x   Sq Epi: x / Non Sq Epi: x / Bacteria: x            RADIOLOGY & ADDITIONAL STUDIES:    
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 Pt reported dyspnea when getting his newspaper on 2/4   Associated with dry cough which he states is unchanged from baseline  No sick contacts  RVP negative    Denies fever, chills, or sputum production. Reports taking Albuterol as needed at home.   In the ED, VS stable, SpO2 bet 90-93%. Received Lasix 40 mg IV after which pt reports improvement in symptoms.   XR suggestive of pulmonary edema        PAST MEDICAL & SURGICAL HISTORY:  Squamous cell carcinoma      [place] (hard of hearing)  uses hearing aid      Arthritis      Deep vein thrombosis (DVT)  Right leg 2016 ? unknown cause      Left carpal tunnel syndrome      DVT (deep venous thrombosis)      H/O inguinal hernia repair  2017 right      H/O colonoscopy  2014      H/O squamous cell carcinoma excision  Bilateral ear 2018      S/P cataract surgery  2009 bilateral      S/P foot surgery, right  1968      S/P arthroscopy of right shoulder  2019      History of carpal tunnel surgery of right wrist  2019        MEDICATIONS  (STANDING):  aspirin enteric coated 81 milliGRAM(s) Oral daily  atorvastatin 10 milliGRAM(s) Oral at bedtime  budesonide 160 MICROgram(s)/formoterol 4.5 MICROgram(s) Inhaler 2 Puff(s) Inhalation two times a day  clopidogrel Tablet 75 milliGRAM(s) Oral daily  ferrous    sulfate 325 milliGRAM(s) Oral daily  methylPREDNISolone sodium succinate Injectable 40 milliGRAM(s) IV Push every 12 hours  tiotropium 2.5 MICROgram(s) Inhaler 2 Puff(s) Inhalation daily      MEDICATIONS  (PRN):  acetaminophen     Tablet .. 650 milliGRAM(s) Oral every 6 hours PRN Mild Pain (1 - 3)  albuterol    90 MICROgram(s) HFA Inhaler 2 Puff(s) Inhalation every 6 hours PRN Shortness of Breath and/or Wheezing  aluminum hydroxide/magnesium hydroxide/simethicone Suspension 30 milliLiter(s) Oral every 4 hours PRN Dyspepsia  melatonin 3 milliGRAM(s) Oral at bedtime PRN Insomnia  ondansetron Injectable 4 milliGRAM(s) IV Push every 8 hours PRN Nausea and/or Vomiting      FAMILY HISTORY:  FH: bladder cancer  father    FHx: uterine cancer  sister        Vital Signs Last 24 Hrs  T(C): 36.7 (07 Feb 2024 19:57), Max: 36.7 (07 Feb 2024 19:57)  T(F): 98 (07 Feb 2024 19:57), Max: 98 (07 Feb 2024 19:57)  HR: 70 (07 Feb 2024 20:41) (70 - 87)  BP: 130/48 (07 Feb 2024 19:57) (130/48 - 130/48)  BP(mean): --  RR: 18 (07 Feb 2024 19:57) (18 - 18)  SpO2: 93% (07 Feb 2024 19:57) (93% - 93%)    Parameters below as of 07 Feb 2024 20:41  Patient On (Oxygen Delivery Method): nasal cannula        PHYSICAL EXAM  General Appearance: cooperative, no acute distress,   HEENT: PERRL, conjunctiva clear, EOM's intact, non injected pharynx, no exudate, TM   normal  Neck: Supple, , no adenopathy, thyroid: not enlarged, no carotid bruit or JVD  Back: Symmetric, no  tenderness,no soft tissue tenderness  Lungs: no distress , wheezing is not present , mildly diminished at the bases   velcro type crackles 1/3 way up  Heart: Regular rate and rhythm, S1, S2 normal, no murmur, rub or gallop  Abdomen: Soft, non-tender, bowel sounds active , no hepatosplenomegaly  Extremities: no cyanosis or edema, no joint swelling  Skin: Skin color, texture normal, no rashes   Neurologic: Alert and oriented X3 , cranial nerves intact, sensory and motor normal,    ECG:    LABS:                          12.0   7.99  )-----------( 216      ( 05 Feb 2024 06:49 )             37.2     02-05    136  |  107  |  34<H>  ----------------------------<  115<H>  4.6   |  25  |  1.05    Ca    9.0      05 Feb 2024 06:49  Mg     2.2     02-04    TPro  7.2  /  Alb  3.2<L>  /  TBili  0.7  /  DBili  x   /  AST  23  /  ALT  32  /  AlkPhos  106  02-04              Urinalysis Basic - ( 05 Feb 2024 06:49 )    Color: x / Appearance: x / SG: x / pH: x  Gluc: 115 mg/dL / Ketone: x  / Bili: x / Urobili: x   Blood: x / Protein: x / Nitrite: x   Leuk Esterase: x / RBC: x / WBC x   Sq Epi: x / Non Sq Epi: x / Bacteria: x            RADIOLOGY & ADDITIONAL STUDIES:    
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 Pt reports it was difficult today to get his newspaper outside and symptoms are interfering with QOL. Associated with dry cough. Denies fever, chills, or sputum production. Reports taking Albuterol as needed at home.   - In the ED, VS stable, SpO2 bet 90-93%. Received Lasix 40 mg IV after which pt reports improvement in symptoms. CXR: persistent central infiltrates similar to Sept. (05 Feb 2024 00:48)    Cardiology consulted for sob  Breathing improved since yesterday evening, reported a good otpt after VI lasix  No CP    2/6. Breathing ok, not much improvement per pt.    MEDICATIONS  (STANDING):  aspirin enteric coated 81 milliGRAM(s) Oral daily  atorvastatin 10 milliGRAM(s) Oral at bedtime  budesonide 160 MICROgram(s)/formoterol 4.5 MICROgram(s) Inhaler 2 Puff(s) Inhalation two times a day  clopidogrel Tablet 75 milliGRAM(s) Oral daily  ferrous    sulfate 325 milliGRAM(s) Oral daily  furosemide    Tablet 40 milliGRAM(s) Oral daily  methylPREDNISolone sodium succinate Injectable 40 milliGRAM(s) IV Push every 12 hours  metoprolol succinate ER 12.5 milliGRAM(s) Oral daily  tiotropium 2.5 MICROgram(s) Inhaler 2 Puff(s) Inhalation daily    MEDICATIONS  (PRN):  acetaminophen     Tablet .. 650 milliGRAM(s) Oral every 6 hours PRN Mild Pain (1 - 3)  albuterol    90 MICROgram(s) HFA Inhaler 2 Puff(s) Inhalation every 6 hours PRN Shortness of Breath and/or Wheezing  aluminum hydroxide/magnesium hydroxide/simethicone Suspension 30 milliLiter(s) Oral every 4 hours PRN Dyspepsia  melatonin 3 milliGRAM(s) Oral at bedtime PRN Insomnia  ondansetron Injectable 4 milliGRAM(s) IV Push every 8 hours PRN Nausea and/or Vomiting    HOME MEDICATIONS:  aspirin 81 mg oral delayed release tablet: 1 tab(s) orally once a day (04 Feb 2024 18:09)  atorvastatin 10 mg oral tablet: 1 tab(s) orally once a day (04 Feb 2024 18:09)  cholecalciferol 25 mcg (1000 intl units) oral tablet: 1 tab(s) orally (04 Feb 2024 19:52)  Entresto 24 mg-26 mg oral tablet: 1 tab(s) orally 2 times a day (04 Feb 2024 19:52)  ferrous sulfate 325 mg (65 mg elemental iron) oral tablet: 1 tab(s) orally once a day (04 Feb 2024 19:52)    T(C): 36.8 (05 Feb 2024 20:20), Max: 36.8 (05 Feb 2024 20:20)  T(F): 98.2 (05 Feb 2024 20:20), Max: 98.2 (05 Feb 2024 20:20)  HR: 95 (06 Feb 2024 08:11) (73 - 96)  BP: 102/47 (05 Feb 2024 20:20) (102/47 - 102/47)  RR: 18 (05 Feb 2024 20:20) (18 - 18)  SpO2: 99% (05 Feb 2024 20:20) (99% - 99%)    Parameters below as of 05 Feb 2024 20:48  Patient On (Oxygen Delivery Method): nasal cannula    Constitutional: NAD, awake and alert, thin  HEENT: PERR, EOMI, Normal Hearing, MMM  Neck: Soft and supple, No LAD, No JVD  Respiratory: Breath sounds slight crackles  Cardiovascular: S1 and S2, regular rate and rhythm, no Murmurs, gallops or rubs  Gastrointestinal: Bowel Sounds present, soft, nontender, nondistended, no guarding, no rebound  Extremities: No peripheral edema  Vascular: 2+ peripheral pulses  Neurological: A/O x 3, no focal deficits  Musculoskeletal: 5/5 strength b/l upper and lower extremities  Skin: No rashes    =======================================    INTERPRETATION OF TELEMETRY: SR 80s    ECG: SR    ========================================    LABS: All Labs Reviewed:                        12.0   7.99  )-----------( 216      ( 05 Feb 2024 06:49 )             37.2     02-06    137  |  108  |  62<H>  ----------------------------<  167<H>  4.2   |  21<L>  |  1.61<H>    Ca    8.9      06 Feb 2024 08:15  Mg     2.2     02-04    TPro  7.2  /  Alb  3.2<L>  /  TBili  0.7  /  DBili  x   /  AST  23  /  ALT  32  /  AlkPhos  106  02-04    Troponin: 15.02 ng/L, Troponin: 22.39 ng/L        CARDIAC TESTING:    < from: TTE Echo Complete w/o Contrast w/ Doppler (02.05.24 @ 15:02) >     Estimated left ventricular ejection fraction is 55 %.   The left ventricle is normal in size, wall motion and contractility as   seen in limited views.   Mild concentric left ventricular hypertrophy is present.   The left atrium is mildly dilated.   The aortic valve is well visualized, appears mildly calcified. Valve   opening seems to be normal.   Mild to Moderate aortic regurgitation is present.   There is calcification of both mitral valve leaflets. The leaflet opening   is normal.   Moderate mitral annular calcification is present.   Mild to Moderate mitral regurgitation is present.   EA reversal of the mitral inflow consistent with reduced compliance of   the   left ventricle.   Normal appearing tricuspid valve structure.   Moderate (2+) tricuspid valve regurgitation is present.   Mild pulmonary hypertension.   Normal appearing pulmonic valve structure.   Mild pulmonic valvular regurgitation (1+) is present.    TTE W or WO Ultrasound Enhancing Agent (09.26.23 @ 08:12) >   1. Technically difficult image quality.   2. Left ventricular systolic function is severely decreased with an ejection fraction visually estimated at 30 to 35 %. There are regional wall motion abnormalities.   3. Multiple segmental abnormalities exist. See findings.   4. Mild to moderate pulmonary hypertension.    < from: Cardiac Catheterization (09.26.23 @ 14:21) >  Successful PCI with JEFFRY to the mid LAD and CSI of proximal LAD  followed by bifurcation kissing simultaneous stents of the proximal LAD and LCx back into the LM.  DAPT for 1 year    RADIOLOGY & ADDITIONAL STUDIES:    Xray Chest 1 View- PORTABLE-Urgent (02.04.24 @ 16:31) Persistent central infiltrates remarkably similar to September 23, 2023.    
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 Pt reports it was difficult today to get his newspaper outside and symptoms are interfering with QOL. Associated with dry cough. Denies fever, chills, or sputum production. Reports taking Albuterol as needed at home.   - In the ED, VS stable, SpO2 bet 90-93%. Received Lasix 40 mg IV after which pt reports improvement in symptoms. CXR: persistent central infiltrates similar to Sept. (05 Feb 2024 00:48)    Cardiology consulted for sob  Breathing improved since yesterday evening, reported a good otpt after VI lasix  No CP    2/6. Breathing ok, not much improvement per pt.    2/8. BP soft, will hold all cardiac meds and c/w entresto only    MEDICATIONS  (STANDING):  aspirin enteric coated 81 milliGRAM(s) Oral daily  atorvastatin 10 milliGRAM(s) Oral at bedtime  budesonide 160 MICROgram(s)/formoterol 4.5 MICROgram(s) Inhaler 2 Puff(s) Inhalation two times a day  clopidogrel Tablet 75 milliGRAM(s) Oral daily  ferrous    sulfate 325 milliGRAM(s) Oral daily  methylPREDNISolone sodium succinate Injectable 40 milliGRAM(s) IV Push every 12 hours  sacubitril 24 mG/valsartan 26 mG 1 Tablet(s) Oral two times a day  tiotropium 2.5 MICROgram(s) Inhaler 2 Puff(s) Inhalation daily    MEDICATIONS  (PRN):  acetaminophen     Tablet .. 650 milliGRAM(s) Oral every 6 hours PRN Mild Pain (1 - 3)  albuterol    90 MICROgram(s) HFA Inhaler 2 Puff(s) Inhalation every 6 hours PRN Shortness of Breath and/or Wheezing  aluminum hydroxide/magnesium hydroxide/simethicone Suspension 30 milliLiter(s) Oral every 4 hours PRN Dyspepsia  melatonin 3 milliGRAM(s) Oral at bedtime PRN Insomnia  ondansetron Injectable 4 milliGRAM(s) IV Push every 8 hours PRN Nausea and/or Vomiting    HOME MEDICATIONS:  aspirin 81 mg oral delayed release tablet: 1 tab(s) orally once a day (04 Feb 2024 18:09)  atorvastatin 10 mg oral tablet: 1 tab(s) orally once a day (04 Feb 2024 18:09)  cholecalciferol 25 mcg (1000 intl units) oral tablet: 1 tab(s) orally (04 Feb 2024 19:52)  Entresto 24 mg-26 mg oral tablet: 1 tab(s) orally 2 times a day (04 Feb 2024 19:52)  ferrous sulfate 325 mg (65 mg elemental iron) oral tablet: 1 tab(s) orally once a day (04 Feb 2024 19:52)      PHYSICAL EXAM:  T(C): 36.4 (08 Feb 2024 07:38), Max: 36.7 (07 Feb 2024 19:57)  T(F): 97.6 (08 Feb 2024 07:38), Max: 98 (07 Feb 2024 19:57)  HR: 64 (08 Feb 2024 07:38) (64 - 87)  BP: 105/48 (08 Feb 2024 07:38) (105/48 - 130/48)  RR: 18 (08 Feb 2024 07:38) (18 - 18)  SpO2: 95% (08 Feb 2024 07:38) (93% - 95%)    Parameters below as of 08 Feb 2024 07:38  Patient On (Oxygen Delivery Method): nasal cannula    Constitutional: NAD, awake and alert, thin  HEENT: PERR, EOMI, Normal Hearing, MMM  Neck: Soft and supple, No LAD, No JVD  Respiratory: Breath sounds slight crackles  Cardiovascular: S1 and S2, regular rate and rhythm, no Murmurs, gallops or rubs  Gastrointestinal: Bowel Sounds present, soft, nontender, nondistended, no guarding, no rebound  Extremities: No peripheral edema  Vascular: 2+ peripheral pulses  Neurological: A/O x 3, no focal deficits  Musculoskeletal: 5/5 strength b/l upper and lower extremities  Skin: No rashes    =======================================    INTERPRETATION OF TELEMETRY: SR 80s    ECG: SR    ========================================    LABS: All Labs Reviewed:    02-08    136  |  107  |  71<H>  ----------------------------<  162<H>  4.6   |  24  |  1.34<H>    Ca    8.8      08 Feb 2024 07:20    Troponin: 15.02 ng/L, Troponin: 22.39 ng/L        CARDIAC TESTING:    < from: TTE Echo Complete w/o Contrast w/ Doppler (02.05.24 @ 15:02) >     Estimated left ventricular ejection fraction is 55 %.   The left ventricle is normal in size, wall motion and contractility as   seen in limited views.   Mild concentric left ventricular hypertrophy is present.   The left atrium is mildly dilated.   The aortic valve is well visualized, appears mildly calcified. Valve   opening seems to be normal.   Mild to Moderate aortic regurgitation is present.   There is calcification of both mitral valve leaflets. The leaflet opening   is normal.   Moderate mitral annular calcification is present.   Mild to Moderate mitral regurgitation is present.   EA reversal of the mitral inflow consistent with reduced compliance of   the   left ventricle.   Normal appearing tricuspid valve structure.   Moderate (2+) tricuspid valve regurgitation is present.   Mild pulmonary hypertension.   Normal appearing pulmonic valve structure.   Mild pulmonic valvular regurgitation (1+) is present.    TTE W or WO Ultrasound Enhancing Agent (09.26.23 @ 08:12) >   1. Technically difficult image quality.   2. Left ventricular systolic function is severely decreased with an ejection fraction visually estimated at 30 to 35 %. There are regional wall motion abnormalities.   3. Multiple segmental abnormalities exist. See findings.   4. Mild to moderate pulmonary hypertension.    < from: Cardiac Catheterization (09.26.23 @ 14:21) >  Successful PCI with JEFFRY to the mid LAD and CSI of proximal LAD  followed by bifurcation kissing simultaneous stents of the proximal LAD and LCx back into the LM.  DAPT for 1 year    RADIOLOGY & ADDITIONAL STUDIES:    Xray Chest 1 View- PORTABLE-Urgent (02.04.24 @ 16:31) Persistent central infiltrates remarkably similar to September 23, 2023.    
HOSPITALIST ATTENDING PROGRESS NOTE    Chart and meds reviewed.  Patient seen and examined.    CC: SOB    Subjective: Feels like breathing is improving. Significant desat w ambulation on RA.     All other systems reviewed and found to be negative with the exception of what has been described above.    MEDICATIONS  (STANDING):  aspirin enteric coated 81 milliGRAM(s) Oral daily  atorvastatin 10 milliGRAM(s) Oral at bedtime  budesonide 160 MICROgram(s)/formoterol 4.5 MICROgram(s) Inhaler 2 Puff(s) Inhalation two times a day  clopidogrel Tablet 75 milliGRAM(s) Oral daily  ferrous    sulfate 325 milliGRAM(s) Oral daily  furosemide    Tablet 40 milliGRAM(s) Oral daily  methylPREDNISolone sodium succinate Injectable 40 milliGRAM(s) IV Push every 12 hours  metoprolol succinate ER 12.5 milliGRAM(s) Oral daily  tiotropium 2.5 MICROgram(s) Inhaler 2 Puff(s) Inhalation daily    MEDICATIONS  (PRN):  acetaminophen     Tablet .. 650 milliGRAM(s) Oral every 6 hours PRN Mild Pain (1 - 3)  albuterol    90 MICROgram(s) HFA Inhaler 2 Puff(s) Inhalation every 6 hours PRN Shortness of Breath and/or Wheezing  aluminum hydroxide/magnesium hydroxide/simethicone Suspension 30 milliLiter(s) Oral every 4 hours PRN Dyspepsia  melatonin 3 milliGRAM(s) Oral at bedtime PRN Insomnia  ondansetron Injectable 4 milliGRAM(s) IV Push every 8 hours PRN Nausea and/or Vomiting      VITALS:  T(F): 98.2 (02-05-24 @ 20:20), Max: 98.2 (02-05-24 @ 20:20)  HR: 95 (02-06-24 @ 08:11) (73 - 96)  BP: 102/47 (02-05-24 @ 20:20) (102/47 - 102/47)  RR: 18 (02-05-24 @ 20:20) (18 - 18)  SpO2: 99% (02-05-24 @ 20:20) (99% - 99%)  Wt(kg): --    I&O's Summary      CAPILLARY BLOOD GLUCOSE          PHYSICAL EXAM:  Gen: NAD  HEENT:  pupils equal and reactive, EOMI, no oropharyngeal lesions, erythema, exudates, oral thrush  NECK:   supple, no carotid bruits, no palpable lymph nodes, no thyromegaly  CV:  +S1, +S2, regular, no murmurs or rubs  RESP:   lungs clear to auscultation bilaterally, no wheezing, rales, rhonchi, good air entry bilaterally  BREAST:  not examined  GI:  abdomen soft, non-tender, non-distended, normal BS, no bruits, no abdominal masses, no palpable masses  RECTAL:  not examined  :  not examined  MSK:   normal muscle tone, no atrophy, no rigidity, no contractions  EXT:  no clubbing, no cyanosis, no edema, no calf pain, swelling or erythema  VASCULAR:  pulses equal and symmetric in the upper and lower extremities  NEURO:  AAOX3, no focal neurological deficits, follows all commands, able to move extremities spontaneously  SKIN:  no ulcers, lesions or rashes    LABS:                            12.0   7.99  )-----------( 216      ( 05 Feb 2024 06:49 )             37.2     02-06    137  |  108  |  62<H>  ----------------------------<  167<H>  4.2   |  21<L>  |  1.61<H>    Ca    8.9      06 Feb 2024 08:15              Urinalysis Basic - ( 06 Feb 2024 08:15 )    Color: x / Appearance: x / SG: x / pH: x  Gluc: 167 mg/dL / Ketone: x  / Bili: x / Urobili: x   Blood: x / Protein: x / Nitrite: x   Leuk Esterase: x / RBC: x / WBC x   Sq Epi: x / Non Sq Epi: x / Bacteria: x    CULTURES:  RVP neg    Additional results/Imaging, I have personally reviewed:  CXR 2/4/24: Persistent central infiltrates remarkably similar to September 23, 2023.    CT chest noncon 2/5/24: Findings of interstitial lung disease (peripheral reticulation and bronchiectasis) bilaterally. New confluent ground-glass opacification at the lung apices and throughout the left lower lobe; differential diagnosis includes infection or acute exacerbation of ILD. Recommend CT chest follow-up in one month to ensure clearing.    Echo 2/5/24: Estimated left ventricular ejection fraction is 55 %. The left ventricle is normal in size, wall motion and contractility as seen in limited views. Mild concentric left ventricular hypertrophy is present. The left atrium is mildly dilated. The aortic valve is well visualized, appears mildly calcified. Valve opening seems to be normal. Mild to Moderate aortic regurgitation is present. There is calcification of both mitral valve leaflets. The leaflet opening is normal. Moderate mitral annular calcification is present. Mild to Moderate mitral regurgitation is present. EA reversal of the mitral inflow consistent with reduced compliance of the left ventricle. Normal appearing tricuspid valve structure. Moderate (2+) tricuspid valve regurgitation is present. Mild pulmonary hypertension. Normal appearing pulmonic valve structure. Mild pulmonic valvular regurgitation (1+) is present.    Telemetry, personally reviewed:  2/5/24: sinus 80-100s  2/6/24: sinus , d/c tele  
HOSPITALIST ATTENDING PROGRESS NOTE    Chart and meds reviewed.  Patient seen and examined.    CC: SOB    Subjective: Some improvement in SOB but not at b/l, updated wife at bedside. S/p iv diuresis, starting steroids given CT findings and elevated inflam markers    All other systems reviewed and found to be negative with the exception of what has been described above.    MEDICATIONS  (STANDING):  aspirin enteric coated 81 milliGRAM(s) Oral daily  atorvastatin 10 milliGRAM(s) Oral at bedtime  budesonide 160 MICROgram(s)/formoterol 4.5 MICROgram(s) Inhaler 2 Puff(s) Inhalation two times a day  clopidogrel Tablet 75 milliGRAM(s) Oral daily  ferrous    sulfate 325 milliGRAM(s) Oral daily  methylPREDNISolone sodium succinate Injectable 40 milliGRAM(s) IV Push every 12 hours  metoprolol succinate ER 12.5 milliGRAM(s) Oral daily  sacubitril 24 mG/valsartan 26 mG 1 Tablet(s) Oral two times a day  tiotropium 2.5 MICROgram(s) Inhaler 2 Puff(s) Inhalation daily    MEDICATIONS  (PRN):  acetaminophen     Tablet .. 650 milliGRAM(s) Oral every 6 hours PRN Mild Pain (1 - 3)  albuterol    90 MICROgram(s) HFA Inhaler 2 Puff(s) Inhalation every 6 hours PRN Shortness of Breath and/or Wheezing  aluminum hydroxide/magnesium hydroxide/simethicone Suspension 30 milliLiter(s) Oral every 4 hours PRN Dyspepsia  melatonin 3 milliGRAM(s) Oral at bedtime PRN Insomnia  ondansetron Injectable 4 milliGRAM(s) IV Push every 8 hours PRN Nausea and/or Vomiting      VITALS:  T(F): 97.4 (02-05-24 @ 08:14), Max: 98.9 (02-05-24 @ 03:00)  HR: 81 (02-05-24 @ 08:14) (68 - 95)  BP: 119/50 (02-05-24 @ 08:14) (108/60 - 141/88)  RR: 18 (02-05-24 @ 08:14) (15 - 30)  SpO2: 92% (02-05-24 @ 08:14) (92% - 100%)  Wt(kg): --    I&O's Summary    04 Feb 2024 07:01  -  05 Feb 2024 07:00  --------------------------------------------------------  IN: 0 mL / OUT: 1100 mL / NET: -1100 mL        CAPILLARY BLOOD GLUCOSE          PHYSICAL EXAM:  Gen: NAD  HEENT:  pupils equal and reactive, EOMI, no oropharyngeal lesions, erythema, exudates, oral thrush  NECK:   supple, no carotid bruits, no palpable lymph nodes, no thyromegaly  CV:  +S1, +S2, regular, no murmurs or rubs  RESP:   lungs clear to auscultation bilaterally, no wheezing, rales, rhonchi, good air entry bilaterally  BREAST:  not examined  GI:  abdomen soft, non-tender, non-distended, normal BS, no bruits, no abdominal masses, no palpable masses  RECTAL:  not examined  :  not examined  MSK:   normal muscle tone, no atrophy, no rigidity, no contractions  EXT:  no clubbing, no cyanosis, no edema, no calf pain, swelling or erythema  VASCULAR:  pulses equal and symmetric in the upper and lower extremities  NEURO:  AAOX3, no focal neurological deficits, follows all commands, able to move extremities spontaneously  SKIN:  no ulcers, lesions or rashes    LABS:                            12.0   7.99  )-----------( 216      ( 05 Feb 2024 06:49 )             37.2     02-05    136  |  107  |  34<H>  ----------------------------<  115<H>  4.6   |  25  |  1.05    Ca    9.0      05 Feb 2024 06:49  Mg     2.2     02-04    TPro  7.2  /  Alb  3.2<L>  /  TBili  0.7  /  DBili  x   /  AST  23  /  ALT  32  /  AlkPhos  106  02-04        LIVER FUNCTIONS - ( 04 Feb 2024 16:07 )  Alb: 3.2 g/dL / Pro: 7.2 gm/dL / ALK PHOS: 106 U/L / ALT: 32 U/L / AST: 23 U/L / GGT: x             Urinalysis Basic - ( 05 Feb 2024 06:49 )    Color: x / Appearance: x / SG: x / pH: x  Gluc: 115 mg/dL / Ketone: x  / Bili: x / Urobili: x   Blood: x / Protein: x / Nitrite: x   Leuk Esterase: x / RBC: x / WBC x   Sq Epi: x / Non Sq Epi: x / Bacteria: x    CULTURES:  RVP neg    Additional results/Imaging, I have personally reviewed:  CXR 2/4/24: Persistent central infiltrates remarkably similar to September 23, 2023.    CT chest noncon 2/5/24: Findings of interstitial lung disease (peripheral reticulation and bronchiectasis) bilaterally. New confluent ground-glass opacification at the lung apices and throughout the left lower lobe; differential diagnosis includes infection or acute exacerbation of ILD. Recommend CT chest follow-up in one month to ensure clearing.    Echo 2/5/24: read pending    Telemetry, personally reviewed:  2/5/24: sinus 80-100s

## 2024-02-08 NOTE — DISCHARGE NOTE PROVIDER - PROVIDER TOKENS
PROVIDER:[TOKEN:[[id number]],FOLLOWUP:[1 week]],PROVIDER:[TOKEN:[[id number]],FOLLOWUP:[1 week]]

## 2024-02-08 NOTE — DISCHARGE NOTE PROVIDER - CARE PROVIDER_API CALL
Elliot Dueñas  Internal Medicine  [address] [place] [address]  Phone: [phone]  Fax: [phone]  Follow Up Time: 1 week    Bharathi Mckenzie  Pulmonary Disease  [address] [place] [address]  Phone: [phone]  Fax: [phone]  Follow Up Time: 1 week

## 2024-02-08 NOTE — PROGRESS NOTE ADULT - ASSESSMENT
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 Pt reported dyspnea when getting his newspaper on 2/4   Associated with dry cough which he states is unchanged from baseline  No sick contacts  RVP negative    Denies fever, chills, or sputum production. Reports taking Albuterol as needed at home.   In the ED, VS stable, SpO2 bet 90-93%. Received Lasix 40 mg IV after which pt reports improvement in symptoms.   XR suggestive of pulmonary edema  acute exacerbation of pulm fibrosis likely dx  Agree with diuresis  Symbicort/Spiriva  Supplemental O2  overall presentation is suggestive of acute exacerbation of pulm fibrosis  DC planning as per primary team  suggest switch to po pred 40 mg x 3 days  30 mg x 3 days  20 mg x 3 days  keep on pred 10 mg till re eval with dr ENCINAS  all discussed with pt today  DC planning as per primary team

## 2024-02-08 NOTE — DISCHARGE NOTE PROVIDER - CARE PROVIDERS DIRECT ADDRESSES
[email or web address]

## 2024-02-26 NOTE — ED ADULT NURSE NOTE - CCCP TRG CHIEF CMPLNT
Attempted to reach patient's wife by telephone. A message was left for return call. Verified patient with two types of identifiers. Let wife know I resent the spironolactone prescription and she also asked about Oxybutynin. Will check with Dr. Lisa Duggan about refilling hat medication. Patient's wife verbalized understanding and will call with any other questions. shortness of breath

## 2024-02-26 NOTE — ED PROVIDER NOTE - NSICDXPASTMEDICALHX_GEN_ALL_CORE_FT
PAST MEDICAL HISTORY:  Arthritis     Deep vein thrombosis (DVT) Right leg 2016 ? unknown cause    DVT (deep venous thrombosis)     [place] (hard of hearing) uses hearing aid    Left carpal tunnel syndrome     Squamous cell carcinoma

## 2024-02-26 NOTE — ED PROVIDER NOTE - OBJECTIVE STATEMENT
87 y/o male with PMHx of arthritis, DVT, [place], left carpal tunnel syndrome, squamous cell carcinoma presents to the ED with wife c/o SOB. Patient endorses a cough for the last 2 weeks, states that the SOB worsened today. Denies fevers, vomiting, sputum production, vomiting, diarrhea, or chest pain.

## 2024-02-26 NOTE — CONSULT NOTE ADULT - SUBJECTIVE AND OBJECTIVE BOX
ICU Consult Note    HPI:    S:    Pt seen and examined  87 y/o male with PMHx of arthritis, DVT, ILD/ Copd on home Oxygen, CAD s/p PCI in the past year on DAPT, Mild diastolic Chf, EF 55%,  presents to the ED with wife c/o SOB. Patient endorses a cough for the last 2 weeks, states that the SOB worsened today. Denies fevers, vomiting, sputum production, vomiting, diarrhea, or chest pain.  Patient had Covid in Dec 2023, treated at home with Paxlovid  and never quite recover; he has been getting progressively more dyspneic and he came to the ED for evaluation   Also had an admission 2weeks ago for AECopd  -found to have bilateral [place] infiltrates and +COVID test    : Hypoxia, refractory to NC +NRB, now on HFNC, tachypneic, only able to speak several words at once    ROS: + sob  Remainder of systems reviewed, negative    Allergies    No Known Allergies    Intolerances        MEDICATIONS  (STANDING):  aspirin enteric coated 81 milliGRAM(s) Oral daily  atorvastatin 10 milliGRAM(s) Oral at bedtime  clopidogrel Tablet 75 milliGRAM(s) Oral daily  dexAMETHasone  Injectable 6 milliGRAM(s) IV Push daily  enoxaparin Injectable 30 milliGRAM(s) SubCutaneous every 24 hours  ferrous    sulfate 325 milliGRAM(s) Oral daily  pantoprazole    Tablet 40 milliGRAM(s) Oral before breakfast  remdesivir  IVPB   IV Intermittent   sacubitril 24 mG/valsartan 26 mG 1 Tablet(s) Oral two times a day  sodium chloride 0.9%. 1000 milliLiter(s) (125 mL/Hr) IV Continuous <Continuous>  tiotropium 2.5 MICROgram(s) Inhaler 2 Puff(s) Inhalation daily    MEDICATIONS  (PRN):  acetaminophen     Tablet .. 650 milliGRAM(s) Oral every 6 hours PRN Mild Pain (1 - 3)  albuterol    90 MICROgram(s) HFA Inhaler 2 Puff(s) Inhalation every 6 hours PRN Shortness of Breath and/or Wheezing  aluminum hydroxide/magnesium hydroxide/simethicone Suspension 30 milliLiter(s) Oral every 4 hours PRN Dyspepsia  melatonin 3 milliGRAM(s) Oral at bedtime PRN Insomnia  ondansetron Injectable 4 milliGRAM(s) IV Push every 6 hours PRN Nausea and/or Vomiting      Drug Dosing Weight  Height (cm): 162.6 (2024 08:49)  Weight (kg): 59.1 (2024 11:45)  BMI (kg/m2): 22.4 (2024 11:45)  BSA (m2): 1.63 (2024 11:45)      PAST MEDICAL & SURGICAL HISTORY:  Squamous cell carcinoma      [place] hearing)  uses hearing aid      Arthritis      Deep vein thrombosis (DVT)  Right leg  ? unknown cause      Left carpal tunnel syndrome      DVT (deep venous thrombosis)      H/O inguinal hernia repair  2017 right      H/O colonoscopy        H/O squamous cell carcinoma excision  Bilateral ear 2018      S/P cataract surgery  2009 bilateral      S/P foot surgery, right  1968      S/P arthroscopy of right shoulder  2019      History of carpal tunnel surgery of right wrist  2019          FAMILY HISTORY:  FH: bladder cancer  father    FHx: uterine cancer  sister          REVIEW OF SYSTEMS    UNLESS OTHERWISE NOTED IN HPI above:    Constitutional:  No Weight Change, No Fever, No Chills, No Night Sweats, No Fatigue, No Malaise  ENT/Mouth:  No Hearing Changes, No Ear Pain, No Nasal Congestion, No  Sinus Pain, No Hoarseness, No sore throat, No Rhinorrhea, No Swallowing  Difficulty  Eyes:  No Eye Pain, No Swelling, No Redness, No Foreign Body, No Discharge, No Vision Changes  Cardiovascular:  No Chest Pain, No SOB, No PND, No Dyspnea on Exertion,  No Orthopnea, No Claudication, No Edema, No Palpitations  Respiratory:  No Cough, No Sputum, No Wheezing, No Smoke Exposure, No Dyspnea  Gastrointestinal:  No Nausea, No Vomiting, No Diarrhea, No  Constipation, No Pain, No Heartburn, No Anorexia, No Dysphagia, No  Hematochezia, No Melena, No Flatulence, No Jaundice  Genitourinary:  No Dysmenorrhea, No DUB, No Dyspareunia, No Dysuria, No  Urinary Frequency, No Hematuria, No Urinary Incontinence, No Urgency,  No Flank Pain, No Urinary Flow Changes, No Hesitancy  Musculoskeletal:  No Arthralgias, No Myalgias, No Joint Swelling, No  Joint Stiffness, No Back Pain, No Neck Pain, No Injury History  Skin:  No Skin Lesions, No Pruritis, No Hair Changes, No Breast/Skin Changes, No Nipple Discharge  Neuro:  No Weakness, No Numbness, No Paresthesias, No Loss of  Consciousness, No Syncope, No Dizziness, No Headache, No Coordination  Changes, No Recent Falls  Psych:  No Anxiety/Panic, No Depression, No Insomnia, No Personality  Changes, No Delusions, No Rumination, No SI/HI/AH/VH, No Social Issues,  No Memory Changes, No Violence/Abuse Hx., No Eating Concerns  Heme/Lymph:  No Bruising, No Bleeding, No Transfusions History, No Lymphadenopathy  Endocrine:  No Polyuria, No Polydipsia, No Temperature Intolerance    O:    ICU Vital Signs Last 24 Hrs  T(C): 37 (2024 14:56), Max: 37.3 (2024 09:30)  T(F): 98.6 (2024 14:56), Max: 99.1 (2024 09:30)  HR: 89 (2024 14:56) (87 - 109)  BP: 102/57 (2024 14:56) (83/42 - 117/61)  BP(mean): 71 (2024 14:56) (54 - 78)  ABP: --  ABP(mean): --  RR: 27 (2024 14:56) (18 - 34)  SpO2: 94% (2024 14:56) (90% - 100%)    O2 Parameters below as of 2024 14:56  Patient On (Oxygen Delivery Method): mask, nonrebreather    I&O's Detail    PE:    Adult lying in bed  + chronically ill appearing  No JVD trachea midline  Normocephalic, atraumatic  S1S2+  Coarse BS B/L  Abd soft NTND  No leg swelling/edema noted  Awake and alert  Skin pink, warm    LABS:    CBC Full  -  ( 2024 09:22 )  WBC Count : 12.39 K/uL  RBC Count : 3.98 M/uL  Hemoglobin : 11.2 g/dL  Hematocrit : 33.5 %  Platelet Count - Automated : 159 K/uL  Mean Cell Volume : 84.2 fl  Mean Cell Hemoglobin : 28.1 pg  Mean Cell Hemoglobin Concentration : 33.4 gm/dL  Auto Neutrophil # : 11.36 K/uL  Auto Lymphocyte # : 0.30 K/uL  Auto Monocyte # : 0.63 K/uL  Auto Eosinophil # : 0.02 K/uL  Auto Basophil # : 0.02 K/uL  Auto Neutrophil % : 91.6 %  Auto Lymphocyte % : 2.4 %  Auto Monocyte % : 5.1 %  Auto Eosinophil % : 0.2 %  Auto Basophil % : 0.2 %        x   |  x   |  x   ----------------------------<  x   x    |  x   |  1.63<H>    Ca    8.6      2024 09:22    TPro  5.7<L>  /  Alb  2.3<L>  /  TBili  0.9  /  DBili  0.2  /  AST  26  /  ALT  28  /  AlkPhos  81      PT/INR - ( 2024 12:14 )   PT: 14.4 sec;   INR: 1.28 ratio           Urinalysis Basic - ( 2024 09:59 )    Color: Yellow / Appearance: Cloudy / S.019 / pH: x  Gluc: x / Ketone: Trace mg/dL  / Bili: Negative / Urobili: 0.2 mg/dL   Blood: x / Protein: 30 mg/dL / Nitrite: Negative   Leuk Esterase: Negative / RBC: 0 /HPF / WBC 1 /HPF   Sq Epi: x / Non Sq Epi: 2 /HPF / Bacteria: Negative /HPF      CAPILLARY BLOOD GLUCOSE            LIVER FUNCTIONS - ( 2024 12:14 )  Alb: 2.3 g/dL / Pro: 5.7 gm/dL / ALK PHOS: 81 U/L / ALT: 28 U/L / AST: 26 U/L / GGT: x

## 2024-02-26 NOTE — CHART NOTE - NSCHARTNOTEFT_GEN_A_CORE
Patient deteriorated acutely while trying to urinate  Sats went down to 50s  HR 60s  Cyanotic, poorly responsive   BMV performed, unable to get good seal due to cachectic state but able to get his sats up to low 80s   He started responding after  HR 120s   Placed back on HFNC, sats stayed in low 80s   Subsequently placed on AVAPS with sats in mid 90s  Gave 1 mg morphine and 1 mg ativan for compliance, will start low dose precedex   Continue AVAPS for now   Wife updated at bedside, she is aware of high intubation risk

## 2024-02-26 NOTE — ED PROVIDER NOTE - PHYSICAL EXAMINATION
Constitutional: NAD AAOx3  Eyes: EOMI, pupils equal  Head: Normocephalic atraumatic  Mouth: no airway obstruction  Cardiovascular: regular rate. Trace pedal edema.   Resp: +Crackles at the lung bases bilaterally.   GI: Abd s/nt/nd  Neuro: CN2-12 intact  Skin: No rashes

## 2024-02-26 NOTE — ED ADULT NURSE REASSESSMENT NOTE - NS ED NURSE REASSESS COMMENT FT1
Pt is calm, cooperative, and comfortable at this time. Lunch ordered. Pt does not have complaints at this time. Awaiting bed upstairs. Safety and comfort maintained.

## 2024-02-26 NOTE — ED PROVIDER NOTE - PROGRESS NOTE DETAILS
Chanda Talley: Patient updated on results for tests and COVID positive. Will plan to give patient Remdesivir if blood pressure does improve. Chanda Talley: Patient updated on results for tests and COVID positive. Will plan to give patient Remdesivir and admit

## 2024-02-26 NOTE — ED ADULT NURSE NOTE - NSICDXPASTMEDICALHX_GEN_ALL_CORE_FT
PAST MEDICAL HISTORY:  Arthritis     Deep vein thrombosis (DVT) Right leg 2016 ? unknown cause    DVT (deep venous thrombosis)     [place] (hard of hearing) uses hearing aid    Left carpal tunnel syndrome     Squamous cell carcinoma

## 2024-02-26 NOTE — ED ADULT NURSE REASSESSMENT NOTE - NS ED NURSE REASSESS COMMENT FT1
Pt placed on high flow. Pt reports work of breathing is better, pt reports improvement. Safety and comfort maintained.

## 2024-02-26 NOTE — PATIENT PROFILE ADULT - FALL HARM RISK - HARM RISK INTERVENTIONS
Assistance with ambulation/Assistance OOB with selected safe patient handling equipment/Communicate Risk of Fall with Harm to all staff/Discuss with provider need for PT consult/Monitor gait and stability/Reinforce activity limits and safety measures with patient and family/Tailored Fall Risk Interventions/Visual Cue: Yellow wristband and red socks/Bed in lowest position, wheels locked, appropriate side rails in place/Call bell, personal items and telephone in reach/Instruct patient to call for assistance before getting out of bed or chair/Non-slip footwear when patient is out of bed/[place] to call system/Physically safe environment - no spills, clutter or unnecessary equipment/Purposeful Proactive Rounding/Room/bathroom lighting operational, light cord in reach

## 2024-02-26 NOTE — ED ADULT NURSE NOTE - NSFALLUNIVINTERV_ED_ALL_ED
Bed/Stretcher in lowest position, wheels locked, appropriate side rails in place/Call bell, personal items and telephone in reach/Instruct patient to call for assistance before getting out of bed/chair/stretcher/Non-slip footwear applied when patient is off stretcher/[place] to call system/Physically safe environment - no spills, clutter or unnecessary equipment/Purposeful proactive rounding/Room/bathroom lighting operational, light cord in reach

## 2024-02-26 NOTE — PHARMACOTHERAPY INTERVENTION NOTE - COMMENTS
Medication history complete, reviewed medication with patients wife Katie at [phone] and confirmed with Tony.

## 2024-02-26 NOTE — ED ADULT NURSE REASSESSMENT NOTE - NS ED NURSE REASSESS COMMENT FT1
Pt reports that he is having difficulty breathing, and that he is working harder to breathe. [facility] contacted for evaluation. Per MD, place pt on high flow. Respiratory at bedside. Pt denies chest pain. Safety and comfort maintained.

## 2024-02-26 NOTE — ED ADULT NURSE NOTE - OBJECTIVE STATEMENT
Pt presents to the ED A/OX4, from home c/o SOB. Pt reports having a cough for 2 weeks worsening today with shortness of breath. Pt denies fevers or chills, denies chest pain.

## 2024-02-26 NOTE — ED PROVIDER NOTE - CLINICAL SUMMARY MEDICAL DECISION MAKING FREE TEXT BOX
87 y/o patient presents to the ED with SOB. Patient with recent hospital admission in early February for CHF. Plan check labs, CXR.

## 2024-02-26 NOTE — H&P ADULT - HISTORY OF PRESENT ILLNESS
·  87 y/o male with PMHx of arthritis, DVT, [place], left carpal tunnel syndrome, squamous cell carcinoma, Copd on home Oxygen, presents to the ED with wife c/o SOB. Patient endorses a cough for the last 2 weeks, states that the SOB worsened today. Denies fevers, vomiting, sputum production, vomiting, diarrhea, or chest pain.  Patient had Covid in Dec 2023, treated at home with Paxlovid  and never quite recover; he has been getting progressively more dyspneic and he came to the ED for evaluation   -found to have bilateral Groud Glass infiltrates     PAST MEDICAL HISTORY:  Arthritis   Deep vein thrombosis (DVT) Right leg 2016   DVT (deep venous thrombosis)   [place] (hard of hearing) uses hearing aid  Left carpal tunnel syndrome   Squamous cell carcinoma.     PAST SURGICAL HISTORY:  H/O colonoscopy 2014  H/O inguinal hernia repair 2017 right  H/O squamous cell carcinoma excision Bilateral ear 2018  History of carpal tunnel surgery of right wrist 2019  S/P arthroscopy of right shoulder 2019  S/P cataract surgery 2009 bilateral  S/P foot surgery, right 1968.     FAMILY HISTORY:  FH: bladder cancer, father  FHx: uterine cancer, sister.    Social History:  never smoker  no Etoh            REVIEW OF SYSTEMS:    CONSTITUTIONAL: No weakness, No fevers or chills  ENT: No ear ache, No sorethroat  NECK: No pain, No stiffness  RESPIRATORY: No cough, No wheezing, No hemoptysis; No dyspnea  CARDIOVASCULAR: No chest pain, No palpitations  GASTROINTESTINAL: No abd pain, No nausea, No vomiting, No hematemesis, No diarrhea or constipation. No melena, No hematochezia.  GENITOURINARY: No dysuria, No  hematuria  NEUROLOGICAL: No diplopia, No paresthesia, No motor dysfunction  MUSCULOSKELETAL: No arthralgia, No myalgia  SKIN: No rashes, or lesions   PSYCH: no anxiety, no suicidal ideation    All other review of systems is negative unless indicated above    Vital Signs Last 24 Hrs  T(C): 37 (26 Feb 2024 14:56), Max: 37.3 (26 Feb 2024 09:30)  T(F): 98.6 (26 Feb 2024 14:56), Max: 99.1 (26 Feb 2024 09:30)  HR: 89 (26 Feb 2024 14:56) (87 - 109)  BP: 102/57 (26 Feb 2024 14:56) (83/42 - 105/55)  BP(mean): 71 (26 Feb 2024 14:56) (54 - 71)  RR: 27 (26 Feb 2024 14:56) (18 - 34)  SpO2: 94% (26 Feb 2024 14:56) (90% - 100%)    Parameters below as of 26 Feb 2024 14:56  Patient On (Oxygen Delivery Method): mask, nonrebreather        PHYSICAL EXAM:    GENERAL: NAD  HEENT:  NC/AT, EOMI, PERRLA, No scleral icterus, Moist mucous membranes  NECK: Supple, No JVD  CNS:  Alert & Oriented X3, Motor Strength 5/5 B/L upper and lower extremities; DTRs 2+ intact   LUNG: Decreased bilat Breath sounds, +mild rales, no wheezing   HEART: RRR; No murmurs, No rubs  ABDOMEN: +BS, ST/ND/NT  GENITOURINARY: Voiding, Bladder not distended  EXTREMITIES:  2+ Peripheral Pulses, No clubbing, No cyanosis, No tibial edema  MUSCULOSKELTAL: Joints normal ROM, No TTP, No effusion  VAGINAL: deferred  SKIN: no rashes  RECTAL: deferred, not indicated  BREAST: deferred                          11.2   12.39 )-----------( 159      ( 26 Feb 2024 09:22 )             33.5     02-26    x   |  x   |  x   ----------------------------<  x   x    |  x   |  1.63<H>    Ca    8.6      26 Feb 2024 09:22    TPro  5.7<L>  /  Alb  2.3<L>  /  TBili  0.9  /  DBili  0.2  /  AST  26  /  ALT  28  /  AlkPhos  81  02-26    Vancomycin levels:   Cultures:     MEDICATIONS  (STANDING):  remdesivir  IVPB   IV Intermittent   sodium chloride 0.9%. 1000 milliLiter(s) (125 mL/Hr) IV Continuous <Continuous>    MEDICATIONS  (PRN):  acetaminophen     Tablet .. 650 milliGRAM(s) Oral every 6 hours PRN Mild Pain (1 - 3)  ondansetron Injectable 4 milliGRAM(s) IV Push every 6 hours PRN Nausea and/or Vomiting      all labs reviewed  all imaging reviewed    a/p:      1. Acute hypoxic resp failure:   post Covid syndrome; Covid test still +  Remdesivir IV, Decadron IV  f/u inlammatory markers   anticoagulation   supplemental Oxygen as needed   Continuous pulse ox monitoring   Albuterol  Advair   Spiriva     2. KYRA:  likely prerenal azotemia  Gentle hydration    3. DVT prophy:   Apixaban     Full Code ·  87 y/o male with PMHx of arthritis, DVT, ILD/ Copd on home Oxygen, CAD s/p PCI in the past year on DAPT, Mild diastolic Chf, EF 55%,  presents to the ED with wife c/o SOB. Patient endorses a cough for the last 2 weeks, states that the SOB worsened today. Denies fevers, vomiting, sputum production, vomiting, diarrhea, or chest pain.  Patient had Covid in Dec 2023, treated at home with Paxlovid  and never quite recover; he has been getting progressively more dyspneic and he came to the ED for evaluation   Also had an admission 2weeks ago for AECopd  -found to have bilateral Groud Glass infiltrates and +COVID test     PAST MEDICAL HISTORY:  Arthritis   Deep vein thrombosis (DVT) Right leg 2016   DVT (deep venous thrombosis)   [place] (hard of hearing) uses hearing aid  Left carpal tunnel syndrome   Squamous cell carcinoma.     PAST SURGICAL HISTORY:  H/O colonoscopy 2014  H/O inguinal hernia repair 2017 right  H/O squamous cell carcinoma excision Bilateral ear 2018  History of carpal tunnel surgery of right wrist 2019  S/P arthroscopy of right shoulder 2019  S/P cataract surgery 2009 bilateral  S/P foot surgery, right 1968.     FAMILY HISTORY:  FH: bladder cancer, father  FHx: uterine cancer, sister.    Social History:  never smoker  no Etoh            REVIEW OF SYSTEMS:    CONSTITUTIONAL: No weakness, No fevers or chills  ENT: No ear ache, No sorethroat  NECK: No pain, No stiffness  RESPIRATORY: No cough, No wheezing, No hemoptysis; No dyspnea  CARDIOVASCULAR: No chest pain, No palpitations  GASTROINTESTINAL: No abd pain, No nausea, No vomiting, No hematemesis, No diarrhea or constipation. No melena, No hematochezia.  GENITOURINARY: No dysuria, No  hematuria  NEUROLOGICAL: No diplopia, No paresthesia, No motor dysfunction  MUSCULOSKELETAL: No arthralgia, No myalgia  SKIN: No rashes, or lesions   PSYCH: no anxiety, no suicidal ideation    All other review of systems is negative unless indicated above    Vital Signs Last 24 Hrs  T(C): 37 (26 Feb 2024 14:56), Max: 37.3 (26 Feb 2024 09:30)  T(F): 98.6 (26 Feb 2024 14:56), Max: 99.1 (26 Feb 2024 09:30)  HR: 89 (26 Feb 2024 14:56) (87 - 109)  BP: 102/57 (26 Feb 2024 14:56) (83/42 - 105/55)  BP(mean): 71 (26 Feb 2024 14:56) (54 - 71)  RR: 27 (26 Feb 2024 14:56) (18 - 34)  SpO2: 94% (26 Feb 2024 14:56) (90% - 100%)    Parameters below as of 26 Feb 2024 14:56  Patient On (Oxygen Delivery Method): mask, nonrebreather        PHYSICAL EXAM:    GENERAL: NAD  HEENT:  NC/AT, EOMI, PERRLA, No scleral icterus, Moist mucous membranes  NECK: Supple, No JVD  CNS:  Alert & Oriented X3, Motor Strength 5/5 B/L upper and lower extremities; DTRs 2+ intact   LUNG: Decreased bilat Breath sounds, +mild rales, no wheezing   HEART: RRR; No murmurs, No rubs  ABDOMEN: +BS, ST/ND/NT  GENITOURINARY: Voiding, Bladder not distended  EXTREMITIES:  2+ Peripheral Pulses, No clubbing, No cyanosis, No tibial edema  MUSCULOSKELTAL: Joints normal ROM, No TTP, No effusion  VAGINAL: deferred  SKIN: no rashes  RECTAL: deferred, not indicated  BREAST: deferred                          11.2   12.39 )-----------( 159      ( 26 Feb 2024 09:22 )             33.5     02-26    x   |  x   |  x   ----------------------------<  x   x    |  x   |  1.63<H>    Ca    8.6      26 Feb 2024 09:22    TPro  5.7<L>  /  Alb  2.3<L>  /  TBili  0.9  /  DBili  0.2  /  AST  26  /  ALT  28  /  AlkPhos  81  02-26    Vancomycin levels:   Cultures:     MEDICATIONS  (STANDING):  remdesivir  IVPB   IV Intermittent   sodium chloride 0.9%. 1000 milliLiter(s) (125 mL/Hr) IV Continuous <Continuous>    MEDICATIONS  (PRN):  acetaminophen     Tablet .. 650 milliGRAM(s) Oral every 6 hours PRN Mild Pain (1 - 3)  ondansetron Injectable 4 milliGRAM(s) IV Push every 6 hours PRN Nausea and/or Vomiting      all labs reviewed  all imaging reviewed    a/p:      1. Acute on chronic  hypoxic resp failure:   COVID 19  Remdesivir IV, Decadron IV  f/u inflammatory markers : ESR and CRP high, DDimer 700  DVT prophy w Lovenox  supplemental Oxygen as needed   Continuous pulse ox monitoring   Albuterol  Advair   Spiriva     2. KYRA:  likely prerenal azotemia  likely underlying Ckd stg III  Gentle hydration    3. DVT prophy:   Lovenox 30mg/day     Full Code

## 2024-02-26 NOTE — ED ADULT TRIAGE NOTE - CHIEF COMPLAINT QUOTE
pt brought in by EMS for shortness of breath. pt denies any chest pain or dizziness. pt states he was here two weeks ago for fluid in lungs. hx of asthma and copd. 93% on room air. no other complaints at this time pt is axo4. NKDA

## 2024-02-27 NOTE — CONSULT NOTE ADULT - SUBJECTIVE AND OBJECTIVE BOX
HPI: Pt is a 88y old Male with hx of       PAIN: ( )Yes   ( )No  Level:  Location:  Intensity:    /10  Quality:  Aggravating Factors:  Alleviating Factors:  Radiation:  Duration/Timing:  Impact on ADLs:    DYSPNEA: ( ) Yes  ( ) No  Level:    PAST MEDICAL & SURGICAL HISTORY:  Squamous cell carcinoma      [place] (hard of hearing)  uses hearing aid      Arthritis      Deep vein thrombosis (DVT)  Right leg  ? unknown cause      Left carpal tunnel syndrome      DVT (deep venous thrombosis)      H/O inguinal hernia repair  2017 right      H/O colonoscopy  2014      H/O squamous cell carcinoma excision  Bilateral ear 2018      S/P cataract surgery  2009 bilateral      S/P foot surgery, right  1968      S/P arthroscopy of right shoulder  2019      History of carpal tunnel surgery of right wrist  2019          SOCIAL HX:    Hx opiate tolerance ( )YES  ( )NO    Baseline ADLs  (Prior to Admission)  ( ) Independent   ( )Dependent    FAMILY HISTORY:  FH: bladder cancer  father    FHx: uterine cancer  sister        Review of Systems:    Anxiety-  Depression-  Physical Discomfort-  Dyspnea-  Constipation-  Diarrhea-  Nausea-  Vomiting-  Anorexia-  Weight Loss-   Cough-  Secretions-  Fatigue-  Weakness-  Delirium-    All other systems reviewed and negative  Unable to obtain/Limited due to:      PHYSICAL EXAM:    Vital Signs Last 24 Hrs  T(C): 36.1 (2024 05:00), Max: 36.6 (2024 20:26)  T(F): 97 (2024 05:00), Max: 97.9 (2024 20:26)  HR: 67 (2024 05:45) (58 - 116)  BP: 111/64 (2024 05:30) (100/54 - 122/63)  BP(mean): 80 (2024 05:30) (66 - 92)  RR: 25 (2024 05:30) (14 - 37)  SpO2: 97% (2024 05:45) (78% - 100%)    Parameters below as of 2024 05:00  Patient On (Oxygen Delivery Method): Avaps-rate 16,  Pressure 12-18    O2 Concentration (%): 100  Daily     Daily Weight in k (2024 05:00)    PPSV2:   %  FAST:    General:  Mental Status:  HEENT:  Lungs:  Cardiac:  GI:  :  Ext:  Neuro:      LABS:                        9.4    14.21 )-----------( 200      ( 2024 05:28 )             29.8         144  |  114<H>  |  54<H>  ----------------------------<  152<H>  5.0   |  23  |  1.26    Ca    8.6      2024 05:28  Phos  4.4       Mg     2.4         TPro  6.0  /  Alb  3.0<L>  /  TBili  0.6  /  DBili  x   /  AST  24  /  ALT  19  /  AlkPhos  133<H>      PT/INR - ( 2024 05:28 )   PT: 16.1 sec;   INR: 1.44 ratio           Albumin: Albumin: 3.0 g/dL ( @ 05:28)      Allergies    No Known Allergies    Intolerances      MEDICATIONS  (STANDING):  albuterol/ipratropium for Nebulization 3 milliLiter(s) Nebulizer every 6 hours  aspirin enteric coated 81 milliGRAM(s) Oral daily  atorvastatin 10 milliGRAM(s) Oral at bedtime  azithromycin  IVPB      azithromycin  IVPB 500 milliGRAM(s) IV Intermittent every 24 hours  cefTRIAXone Injectable. 1000 milliGRAM(s) IV Push every 24 hours  chlorhexidine 2% Cloths 1 Application(s) Topical <User Schedule>  clopidogrel Tablet 75 milliGRAM(s) Oral daily  dextrose 5%. 1000 milliLiter(s) (100 mL/Hr) IV Continuous <Continuous>  dextrose 5%. 1000 milliLiter(s) (50 mL/Hr) IV Continuous <Continuous>  dextrose 50% Injectable 25 Gram(s) IV Push once  dextrose 50% Injectable 12.5 Gram(s) IV Push once  dextrose 50% Injectable 25 Gram(s) IV Push once  ferrous    sulfate 325 milliGRAM(s) Oral daily  glucagon  Injectable 1 milliGRAM(s) IntraMuscular once  heparin   Injectable 5000 Unit(s) SubCutaneous every 8 hours  insulin lispro (ADMELOG) corrective regimen sliding scale   SubCutaneous at bedtime  insulin lispro (ADMELOG) corrective regimen sliding scale   SubCutaneous three times a day before meals  lactated ringers. 1000 milliLiter(s) (75 mL/Hr) IV Continuous <Continuous>  methylPREDNISolone sodium succinate Injectable 40 milliGRAM(s) IV Push every 8 hours  norepinephrine Infusion 0.05 MICROgram(s)/kG/Min (5.62 mL/Hr) IV Continuous <Continuous>  pantoprazole  Injectable 40 milliGRAM(s) IV Push daily  remdesivir  IVPB   IV Intermittent   remdesivir  IVPB 100 milliGRAM(s) IV Intermittent every 24 hours    MEDICATIONS  (PRN):  acetaminophen     Tablet .. 650 milliGRAM(s) Oral every 6 hours PRN Mild Pain (1 - 3)  aluminum hydroxide/magnesium hydroxide/simethicone Suspension 30 milliLiter(s) Oral every 4 hours PRN Dyspepsia  dextrose Oral Gel 15 Gram(s) Oral once PRN Blood Glucose LESS THAN 70 milliGRAM(s)/deciliter  ondansetron Injectable 4 milliGRAM(s) IV Push every 6 hours PRN Nausea and/or Vomiting      RADIOLOGY/ADDITIONAL STUDIES:   HPI:  "·  89 y/o male with PMHx of arthritis, DVT, ILD/ Copd on home Oxygen, CAD s/p PCI in the past year on DAPT, Mild diastolic Chf, EF 55%,  presents to the ED with wife c/o SOB. Patient endorses a cough for the last 2 weeks, states that the SOB worsened today. Denies fevers, vomiting, sputum production, vomiting, diarrhea, or chest pain.  Patient had Covid in Dec 2023, treated at home with Paxlovid  and never quite recover; he has been getting progressively more dyspneic and he came to the ED for evaluation  "       Patient seen and evaluated in ICU- patient was on Bipap comfortable and in no acute distress pt was aware of his hospitalization and had some basic knowledge of what had happened.      His capacity at that time was simple- i don't think he had full understanding of complex decisions this morning.  But he was in good spirits.     Mr. Baires at home is a very active person typically. Loves to do things for himself and his independence is invaluable to him.  His wife Katie , myself , ICU and pts Granddaughter Ashly- and her  Obdulio were present for discussions     Pt currently with what's thought to be acute exacerbation of his worsening lung disease.  Although he is covid positive this may just be from his prior infection.      We discussed Palliative Care team being a supportive team when a patient has ongoing illnesses.  We also discussed that it is not an end of life care service, but can help navigate symptoms and emotional support throughout their hospital stay here.     Hospice was explained as well as an end of life care philosophy. When a disease cannot be cured, or family/patient decide the treatment burdens out weight the risk and chose to change focus of treatment from cure to quality/comfort.       We discussed at length patients underlying clinical diagnosis at length.  We discussed resuscitation and risk and benefits of CPR. Risks include, broken ribs, lung puncture, pain and discomfort.   At this time due to patients underlying irreversible diagnosis of advanced lung disease and hx of CAD and overall decline over past two months  I would not recommend CPR because it would not reverse current medical condition.  They understand that DNR means NO CPR and that would allow natural death.  No CPR means no attempt to restart the heart once it has stopped.  Family verbalized understanding.     We also discussed mechanical ventilation in  an event that they could no longer breath on their own.  Risk and benefits of mechanical ventilation were discussed at length.  At this time, due to patients irreversible medical condition of  diagnosis of advanced lung disease and hx of CAD and overall decline over past two months  it is of concern that if patient were to be intubated, extubation may not be possible.  Also intubation would not reverse current medical condition which if progresses its natural course would lead to the patients death.  Patient was able to verbalized understanding.         PAIN: ( )Yes   ( x)No   DYSPNEA: (x ) Yes  ( ) No  Level: moderate- on bipap     PAST MEDICAL & SURGICAL HISTORY:  Squamous cell carcinoma      [place] (hard of hearing)  uses hearing aid      Arthritis      Deep vein thrombosis (DVT)  Right leg  ? unknown cause      Left carpal tunnel syndrome      DVT (deep venous thrombosis)      H/O inguinal hernia repair  2017 right      H/O colonoscopy  2014      H/O squamous cell carcinoma excision  Bilateral ear 2018      S/P cataract surgery  2009 bilateral      S/P foot surgery, right  1968      S/P arthroscopy of right shoulder  2019      History of carpal tunnel surgery of right wrist  2019          SOCIAL HX:    Hx opiate tolerance ( )YES  (x )NO    Baseline ADLs  (Prior to Admission)  ( x) Independent   ( )Dependent    FAMILY HISTORY:  FH: bladder cancer  father    FHx: uterine cancer  sister        Review of Systems:    Anxiety-  Depression-  Physical Discomfort- calm- dsicomfort from bipap mask  Dyspnea-  ++   Constipation-   Diarrhea-  Nausea- ++  Vomiting-  Anorexia- none- reports hunger  Weight Loss-    Cough- +  Secretions- --  Fatigue- +  Weakness- ++  Delirium-    All other systems reviewed and negative  Unable to obtain/Limited due to:      PHYSICAL EXAM:    Vital Signs Last 24 Hrs  T(C): 36.1 (2024 05:00), Max: 36.6 (2024 20:26)  T(F): 97 (2024 05:00), Max: 97.9 (2024 20:26)  HR: 67 (2024 05:45) (58 - 116)  BP: 111/64 (2024 05:30) (100/54 - 122/63)  BP(mean): 80 (2024 05:30) (66 - 92)  RR: 25 (2024 05:30) (14 - 37)  SpO2: 97% (2024 05:45) (78% - 100%)    Parameters below as of 2024 05:00  Patient On (Oxygen Delivery Method): Avaps-rate 16,  Pressure 12-18    O2 Concentration (%): 100  Daily     Daily Weight in k (2024 05:00)    PPSV2:  30 %  FAST:    General: calm in NAD  Mental Status: awake alert oriented x3  HEENT: eomi, perrl  Lungs: decreased b/s whith some scattered rhonchi  Cardiac: s1s2   GI: soft nontender +BS  : voids  Ext: moves all 4 extremities spontaneously  Neuro: no gross findings     LABS:                        9.4    14.21 )-----------( 200      ( 2024 05:28 )             29.8         144  |  114<H>  |  54<H>  ----------------------------<  152<H>  5.0   |  23  |  1.26    Ca    8.6      2024 05:28  Phos  4.4       Mg     2.4         TPro  6.0  /  Alb  3.0<L>  /  TBili  0.6  /  DBili  x   /  AST  24  /  ALT  19  /  AlkPhos  133<H>      PT/INR - ( 2024 05:28 )   PT: 16.1 sec;   INR: 1.44 ratio           Albumin: Albumin: 3.0 g/dL ( @ 05:28)      Allergies    No Known Allergies    Intolerances      MEDICATIONS  (STANDING):  albuterol/ipratropium for Nebulization 3 milliLiter(s) Nebulizer every 6 hours  aspirin enteric coated 81 milliGRAM(s) Oral daily  atorvastatin 10 milliGRAM(s) Oral at bedtime  azithromycin  IVPB      azithromycin  IVPB 500 milliGRAM(s) IV Intermittent every 24 hours  cefTRIAXone Injectable. 1000 milliGRAM(s) IV Push every 24 hours  chlorhexidine 2% Cloths 1 Application(s) Topical <User Schedule>  clopidogrel Tablet 75 milliGRAM(s) Oral daily  dextrose 5%. 1000 milliLiter(s) (100 mL/Hr) IV Continuous <Continuous>  dextrose 5%. 1000 milliLiter(s) (50 mL/Hr) IV Continuous <Continuous>  dextrose 50% Injectable 25 Gram(s) IV Push once  dextrose 50% Injectable 12.5 Gram(s) IV Push once  dextrose 50% Injectable 25 Gram(s) IV Push once  ferrous    sulfate 325 milliGRAM(s) Oral daily  glucagon  Injectable 1 milliGRAM(s) IntraMuscular once  heparin   Injectable 5000 Unit(s) SubCutaneous every 8 hours  insulin lispro (ADMELOG) corrective regimen sliding scale   SubCutaneous at bedtime  insulin lispro (ADMELOG) corrective regimen sliding scale   SubCutaneous three times a day before meals  lactated ringers. 1000 milliLiter(s) (75 mL/Hr) IV Continuous <Continuous>  methylPREDNISolone sodium succinate Injectable 40 milliGRAM(s) IV Push every 8 hours  norepinephrine Infusion 0.05 MICROgram(s)/kG/Min (5.62 mL/Hr) IV Continuous <Continuous>  pantoprazole  Injectable 40 milliGRAM(s) IV Push daily  remdesivir  IVPB   IV Intermittent   remdesivir  IVPB 100 milliGRAM(s) IV Intermittent every 24 hours    MEDICATIONS  (PRN):  acetaminophen     Tablet .. 650 milliGRAM(s) Oral every 6 hours PRN Mild Pain (1 - 3)  aluminum hydroxide/magnesium hydroxide/simethicone Suspension 30 milliLiter(s) Oral every 4 hours PRN Dyspepsia  dextrose Oral Gel 15 Gram(s) Oral once PRN Blood Glucose LESS THAN 70 milliGRAM(s)/deciliter  ondansetron Injectable 4 milliGRAM(s) IV Push every 6 hours PRN Nausea and/or Vomiting      RADIOLOGY/ADDITIONAL STUDIES:

## 2024-02-27 NOTE — DIETITIAN INITIAL EVALUATION ADULT - ORAL INTAKE PTA/DIET HISTORY
Unable to obtain intake/ diet hx pta 2/2 pt lethargic but arousable, however appears confused at time of visit.

## 2024-02-27 NOTE — DIETITIAN NUTRITION RISK NOTIFICATION - ADDITIONAL COMMENTS/DIETITIAN RECOMMENDATIONS
1) Consider SLP eval to assess for safest/ least restrictive diet consistency/ texture when diet is able to be advanced  2) Advance diet to Regular + SLP recs as soon as medically feasible  3) Add ensure plus high protein BID to optimize PO intake (provides 350 kcal, 20g protein/ shake) when diet is advanced  4) Obtain vitamin D 25OH level to assess nutriture  5) Please obtain daily weights  6) Consider adding thiamine 100 mg daily 2/2 poor PO intake/ malnutrition  7) MVI w/ minerals daily to ensure 100% RDA met  8) Monitor and optimize BG levels between 140-180 mg/dL by medical management  - If BGs become elevated, can change diet to Consistent Carbohydrate + SLP recs  9) Confirm goals of care regarding nutrition support - if diet cannot be advanced within the next 24-48 hours, recommend initiating nutrition support (EN vs PN)  RD will continue to monitor PO intake, labs, hydration, and wt prn.

## 2024-02-27 NOTE — PROGRESS NOTE ADULT - ASSESSMENT
Patient with hx. of ILD, previous covid  now presents with worsening infiltrates  now COVID positive again  worsening cxr, hypoxia,  at high risk of intubation    Neuro - lethargic non focal, will try to avoid sedatives, but may need some for dyspnea  Respiratory - ILD and covid, on remdesivir, steroids, on abx. but procal of 0.08 makes bacterial pneumonia lest likely     will attempt to transition to high flow  cv - nsr, on levophed, will titrate down, slightly elevated troponin, 200, likely demand ischemia, will check echo  Renal - KYRA creatinine inc likely from dehydration, improved with hydration  GI - abdomen soft, tolerate diet when awake  DVT P venous dopplers negative, heparin sub  Endo tsh ok, sliding scale for steroid induced hyperglycemia  GOC full code,  Patient with hx. of ILD, previous covid  now presents with worsening infiltrates  now COVID positive again  worsening cxr, hypoxia,  at high risk of intubation    Neuro - lethargic non focal, will try to avoid sedatives, but may need some for dyspnea  Respiratory - ILD and covid, on remdesivir, steroids, on abx. but procal of 0.08 makes bacterial pneumonia lest likely     will attempt to transition to high flow  cv - nsr, on levophed, will titrate down, slightly elevated troponin, 200, likely demand ischemia, will check echo  Renal - KYRA creatinine inc likely from dehydration, improved with hydration  GI - abdomen soft, tolerate diet when awake  DVT P venous dopplers negative, heparin sub  Endo tsh ok, sliding scale for steroid induced hyperglycemia  GOC full code,  I had extensive discussion with patient wife, son, grandchildren re disease, and prognosis    and options of intubation.  The are actively considering possible dnr/dnr order, no definitive decision yet

## 2024-02-27 NOTE — DIETITIAN INITIAL EVALUATION ADULT - PERTINENT LABORATORY DATA
02-27    141  |  114<H>  |  57<H>  ----------------------------<  180<H>  4.8   |  21<L>  |  1.39<H>    Ca    8.4<L>      27 Feb 2024 06:17  Phos  4.4     02-27  Mg     2.4     02-27    TPro  6.1  /  Alb  2.3<L>  /  TBili  0.6  /  DBili  x   /  AST  35  /  ALT  28  /  AlkPhos  114  02-27  POCT Blood Glucose.: 154 mg/dL (02-27-24 @ 12:01)  A1C with Estimated Average Glucose Result: 6.8 % (02-27-24 @ 06:17)

## 2024-02-27 NOTE — PROGRESS NOTE ADULT - SUBJECTIVE AND OBJECTIVE BOX
Patient is a 88y old  Male who presents with a chief complaint of     BRIEF HOSPITAL COURSE-  89 y/o Male with PMHx of Arthritis, DVT, ILD, COPD (On Home O2), CAD (s/p PCI), HFpEF (EF 55%) presents to  ED complaining of shortness of breath. Reports cough x1 week. Of note, patient diagnosed with COVID 19 December 2023 and treated outpatient with Paxlovid. Admitted to Medicine for acute hypoxic respiratory failure secondary to ILD/COPD exacerbation in setting of COVID 19. Course complicated by persistent tachypnea requiring upgrade to ICU and initiation of NIPPV.       Events last 24 hours:   Worsening hypotension, SBP 80s. s/p 1L LR with minimal improvement Initiated on Levophed gtt. Remains on NIPPV. Mental status poor.       Review of Systems:    [X] Unable to obtain secondary to current mental status/ medical condition.         PAST MEDICAL & SURGICAL HISTORY-  Squamous cell carcinoma      [place] (hard of hearing)  uses hearing aid      Arthritis      Deep vein thrombosis (DVT)  Right leg 2016 ? unknown cause      Left carpal tunnel syndrome      DVT (deep venous thrombosis)      H/O inguinal hernia repair  2017 right      H/O colonoscopy  2014      H/O squamous cell carcinoma excision  Bilateral ear 2018      S/P cataract surgery  2009 bilateral      S/P foot surgery, right  1968      S/P arthroscopy of right shoulder  2019      History of carpal tunnel surgery of right wrist  2019          Medications:  azithromycin  IVPB      azithromycin  IVPB 500 milliGRAM(s) IV Intermittent every 24 hours  cefTRIAXone Injectable. 1000 milliGRAM(s) IV Push every 24 hours  remdesivir  IVPB   IV Intermittent   remdesivir  IVPB 100 milliGRAM(s) IV Intermittent every 24 hours    norepinephrine Infusion 0.05 MICROgram(s)/kG/Min IV Continuous <Continuous>  sacubitril 24 mG/valsartan 26 mG 1 Tablet(s) Oral two times a day    albuterol/ipratropium for Nebulization 3 milliLiter(s) Nebulizer every 6 hours    acetaminophen     Tablet .. 650 milliGRAM(s) Oral every 6 hours PRN  ondansetron Injectable 4 milliGRAM(s) IV Push every 6 hours PRN      aspirin enteric coated 81 milliGRAM(s) Oral daily  clopidogrel Tablet 75 milliGRAM(s) Oral daily  heparin   Injectable 5000 Unit(s) SubCutaneous every 8 hours    aluminum hydroxide/magnesium hydroxide/simethicone Suspension 30 milliLiter(s) Oral every 4 hours PRN  pantoprazole  Injectable 40 milliGRAM(s) IV Push daily      atorvastatin 10 milliGRAM(s) Oral at bedtime  methylPREDNISolone sodium succinate Injectable 40 milliGRAM(s) IV Push every 8 hours    ferrous    sulfate 325 milliGRAM(s) Oral daily  lactated ringers. 1000 milliLiter(s) IV Continuous <Continuous>        ICU Vital Signs Last 24 Hrs  T(C): 37.5 (26 Feb 2024 23:19), Max: 37.9 (26 Feb 2024 17:30)  T(F): 99.5 (26 Feb 2024 23:19), Max: 100.3 (26 Feb 2024 17:30)  HR: 66 (27 Feb 2024 03:45) (65 - 121)  BP: 94/47 (27 Feb 2024 03:45) (80/45 - 149/41)  BP(mean): 60 (27 Feb 2024 03:45) (54 - 100)  ABP: --  ABP(mean): --  RR: 15 (27 Feb 2024 03:45) (15 - 35)  SpO2: 96% (27 Feb 2024 03:45) (87% - 100%)    O2 Parameters below as of 26 Feb 2024 19:30  Patient On (Oxygen Delivery Method): BiPAP/CPAP        ABG - ( 26 Feb 2024 21:47 )  pH, Arterial: 7.32  pH, Blood: x     /  pCO2: 32    /  pO2: 195   / HCO3: 16    / Base Excess: -8.5  /  SaO2: 100.0       I&O's Detail        LABS:                        9.6    11.10 )-----------( 142      ( 26 Feb 2024 22:25 )             29.6     143  |  117<H>  |  60<H>  ----------------------------<  198<H>  5.1   |  19<L>  |  1.45<H>    Ca    8.0<L>      26 Feb 2024 22:25    TPro  5.6<L>  /  Alb  2.2<L>  /  TBili  0.6  /  DBili  x   /  AST  34  /  ALT  32  /  AlkPhos  110  02-26      CAPILLARY BLOOD GLUCOSE        PT/INR - ( 26 Feb 2024 12:14 )   PT: 14.4 sec;   INR: 1.28 ratio         Urinalysis Basic - ( 26 Feb 2024 22:25 )  Color: x / Appearance: x / SG: x / pH: x  Gluc: 198 mg/dL / Ketone: x  / Bili: x / Urobili: x   Blood: x / Protein: x / Nitrite: x   Leuk Esterase: x / RBC: x / WBC x   Sq Epi: x / Non Sq Epi: x / Bacteria: x      CULTURES:      Physical Examination:  General: Elderly male, cachetic, chronically ill, toxic appearing. In no acute distress.    HEENT: Pupils equal, reactive to light. Symmetric.  PULM: Crackles/ course to auscultation bilaterally. No significant sputum production.  NECK: Supple, no lymphadenopathy, trachea midline.  CVS: Regular rate and rhythm. No murmurs, rubs, or gallops. S1, S2 intact.   ABD: Soft, nondistended, nontender, normoactive bowel sounds. No masses palpable.   EXT: No edema, nontender.   SKIN: Warm and well perfused, no rashes noted.  NEURO: Stuporous responsive to painful stimuli. Unable to follow commands, minimally interactive. Nonfocal.  DEVICES:       RADIOLOGY-    < from: CT Chest No Cont (02.26.24 @ 10:39) >  ACC: [id number] EXAM:  CT CHEST   ORDERED BY: MACKENZIE OLIVARES     PROCEDURE DATE:  02/26/2024        INTERPRETATION:  CLINICAL INFORMATION: Shortness of breath and cough,   patient has history of ILD according to chart note dated 2/8/2024.    COMPARISON: CT chest 2/5/2024.    CONTRAST/COMPLICATIONS:  IV Contrast: NONE  Oral Contrast: NONE  Complications: None reported at time of study completion    PROCEDURE:  CT of the Chest was performed.  Sagittal and coronal reformats were performed.    FINDINGS:    LUNGS AND AIRWAYS: Extensive ground-glass opacification and patchy   opacities involving all lung lobes with airway dilatation both centrally   and peripherally and demonstrating interval progression compared to   2/5/2024; for example, there is newground-glass opacification in the   right middle lobe, which is previously aerated (image 73, series 2).  PLEURA: No pleural effusion.  MEDIASTINUM AND PIO: There are stable nonspecific mediastinal lymph   nodes, likely reactive.  VESSELS: Calcification of aorta and its branches.  HEART: Heart size is normal. No pericardial effusion. Aortic valve,   mitral annular, and coronary artery calcification.  CHEST WALL AND LOWER NECK: Within normal limits.  VISUALIZED UPPER ABDOMEN: Left renal cyst. Stable calcified right renal   artery aneurysm.  BONES: Degenerative changes.    IMPRESSION:    Extensive ground-glass opacification and patchy opacities involving all   lung lobes with interval progression compared to 2/5/2024.    Differential diagnosisincludes pneumonia and acute exacerbation of ILD   (given history of ILD)    --- End of Report ---        ANTONI BROWN MD; Resident Radiologist  This document has been electronically signed.  VALERIE LANG MD; Attending Radiologist  This document has been electronically signed. Feb 26 2024 11:02AM    < end of copied text >      CRITICAL CARE TIME SPENT: 45 minutes assessing presenting problems of acute illness, which pose high probability of life threatening deterioration or end organ damage/dysfunction, as well as medical decision making including initiating plan of care, reviewing data, reviewing radiologic exams, discussing with multidisciplinary team,  discussing goals of care with patient/family, and writing this note.  Non-inclusive of procedures performed,      DATE OF ENTRY OF THIS NOTE IS EQUAL TO THE DATE OF SERVICES RENDERED

## 2024-02-27 NOTE — CONSULT NOTE ADULT - CONVERSATION DETAILS
We discussed Palliative Care team being a supportive team when a patient has ongoing illnesses.  We also discussed that it is not an end of life care service, but can help navigate symptoms and emotional support throughout their hospital stay here.     Hospice was explained as well as an end of life care philosophy. When a disease cannot be cured, or family/patient decide the treatment burdens out weight the risk and chose to change focus of treatment from cure to quality/comfort.       We discussed at length patients underlying clinical diagnosis at length.  We discussed resuscitation and risk and benefits of CPR. Risks include, broken ribs, lung puncture, pain and discomfort.   At this time due to patients underlying irreversible diagnosis of advanced lung disease and hx of CAD and overall decline over past two months  I would not recommend CPR because it would not reverse current medical condition.  They understand that DNR means NO CPR and that would allow natural death.  No CPR means no attempt to restart the heart once it has stopped.  Family verbalized understanding.     We also discussed mechanical ventilation in  an event that they could no longer breath on their own.  Risk and benefits of mechanical ventilation were discussed at length.  At this time, due to patients irreversible medical condition of  diagnosis of advanced lung disease and hx of CAD and overall decline over past two months  it is of concern that if patient were to be intubated, extubation may not be possible.  Also intubation would not reverse current medical condition which if progresses its natural course would lead to the patients death.  Patient was able to verbalized understanding.         Pts wife will try to discuss more w/ Bandar- although she understands he does not seem to have capacity to make complex decisions- she knows this and also feels comfortable making decsiions as his surrogate and wife.

## 2024-02-27 NOTE — PROGRESS NOTE ADULT - SUBJECTIVE AND OBJECTIVE BOX
Interval History:     on Bipap     on 0.1 of Levophed     procal - 0.08    HPI:       89 y/o Male with PMHx of  ILD, COPD (On Home O2), CAD (s/p PCI), HFpEF (EF 55%) presents to  ED complaining of shortness of breath.  and cough x1 week. Of note, patient diagnosed with COVID 19 December 2023 and treated outpatient with Paxlovid.  was admitted in February for ILD exascerbation at which time he tested negative for covid. Now again presents with sob, worsening cxr.  Admitted to Medicine for acute hypoxic respiratory failure secondary to ILD/COPD exacerbation in setting of COVID 19. Course complicated by persistent tachypnea requiring upgrade to ICU and initiation of NIPPV.   ABG this am 7.29/36/253 on AVAPS    Events last 24 hours:   Worsening hypotension,      Review of Systems:    [X] Unable to obtain secondary to current mental status/ medical condition.        MEDICATIONS  (STANDING):  albumin human 25% IVPB 100 milliLiter(s) IV Intermittent every 6 hours  albuterol/ipratropium for Nebulization 3 milliLiter(s) Nebulizer every 6 hours  aspirin enteric coated 81 milliGRAM(s) Oral daily  atorvastatin 10 milliGRAM(s) Oral at bedtime  azithromycin  IVPB      azithromycin  IVPB 500 milliGRAM(s) IV Intermittent every 24 hours  cefTRIAXone Injectable. 1000 milliGRAM(s) IV Push every 24 hours  chlorhexidine 2% Cloths 1 Application(s) Topical <User Schedule>  clopidogrel Tablet 75 milliGRAM(s) Oral daily  ferrous    sulfate 325 milliGRAM(s) Oral daily  heparin   Injectable 5000 Unit(s) SubCutaneous every 8 hours  lactated ringers. 1000 milliLiter(s) (75 mL/Hr) IV Continuous <Continuous>  methylPREDNISolone sodium succinate Injectable 40 milliGRAM(s) IV Push every 8 hours  norepinephrine Infusion 0.05 MICROgram(s)/kG/Min (5.62 mL/Hr) IV Continuous <Continuous>  pantoprazole  Injectable 40 milliGRAM(s) IV Push daily  remdesivir  IVPB   IV Intermittent   remdesivir  IVPB 100 milliGRAM(s) IV Intermittent every 24 hours  sacubitril 24 mG/valsartan 26 mG 1 Tablet(s) Oral two times a day    MEDICATIONS  (PRN):  acetaminophen     Tablet .. 650 milliGRAM(s) Oral every 6 hours PRN Mild Pain (1 - 3)  aluminum hydroxide/magnesium hydroxide/simethicone Suspension 30 milliLiter(s) Oral every 4 hours PRN Dyspepsia  ondansetron Injectable 4 milliGRAM(s) IV Push every 6 hours PRN Nausea and/or Vomiting    Height (cm): 162.6 (02-26 @ 08:49)  Weight (kg): 59.9 (02-26 @ 17:30)  BMI (kg/m2): 22.7 (02-26 @ 17:30)    ICU Vital Signs Last 24 Hrs  T(C): 37.5 (26 Feb 2024 23:19), Max: 37.9 (26 Feb 2024 17:30)  T(F): 99.5 (26 Feb 2024 23:19), Max: 100.3 (26 Feb 2024 17:30)  HR: 66 (27 Feb 2024 07:00) (60 - 121)  BP: 105/55 (27 Feb 2024 07:00) (80/45 - 149/41)  BP(mean): 72 (27 Feb 2024 07:00) (54 - 100)  ABP: --  ABP(mean): --  RR: 14 (27 Feb 2024 07:00) (13 - 35)  SpO2: 98% (27 Feb 2024 07:00) (87% - 100%)    O2 Parameters below as of 26 Feb 2024 19:30  Patient On (Oxygen Delivery Method): BiPAP/CPAP    Physical Exam    General - lethargic  HEENT - bipap on , mucous membranes dry  Neck - supple  lungs bilateral crackles - cxr bilateral infiltrates  cv - rrr  abdomen - soft, non tender   voiding  extremieis warm  neuro - lethargic , non focal    I&O's Summary    26 Feb 2024 07:01  -  27 Feb 2024 07:00  --------------------------------------------------------  IN: 1695 mL / OUT: 150 mL / NET: 1545 mL                         9.8    9.93  )-----------( 191      ( 27 Feb 2024 06:17 )             30.6       02-27    141  |  114<H>  |  57<H>  ----------------------------<  180<H>  4.8   |  21<L>  |  1.39<H>    Ca    8.4<L>      27 Feb 2024 06:17  Phos  4.4     02-27  Mg     2.4     02-27    TPro  6.1  /  Alb  2.3<L>  /  TBili  0.6  /  DBili  x   /  AST  35  /  ALT  28  /  AlkPhos  114  02-27    ABG - ( 27 Feb 2024 06:53 )  pH, Arterial: 7.29  pH, Blood: x     /  pCO2: 36    /  pO2: 253   / HCO3: 17    / Base Excess: -8.5  /  SaO2: 100       Urinalysis Basic - ( 27 Feb 2024 06:17 )    Color: x / Appearance: x / SG: x / pH: x  Gluc: 180 mg/dL / Ketone: x  / Bili: x / Urobili: x   Blood: x / Protein: x / Nitrite: x   Leuk Esterase: x / RBC: x / WBC x   Sq Epi: x / Non Sq Epi: x / Bacteria: x    I personally reviewed the CXR: bilateral interstitial infiltrates    DVT Prophylaxis:   Heparin sub                                                              Advanced Directives: Full Code, was d/w Patient on admission         Labs, Notes, Orders, radiologic studies reviewed and care coordinated with multidisciplinary team

## 2024-02-27 NOTE — DIETITIAN INITIAL EVALUATION ADULT - PERTINENT MEDS FT
MEDICATIONS  (STANDING):  albumin human 25% IVPB 100 milliLiter(s) IV Intermittent every 6 hours  albuterol/ipratropium for Nebulization 3 milliLiter(s) Nebulizer every 6 hours  aspirin enteric coated 81 milliGRAM(s) Oral daily  atorvastatin 10 milliGRAM(s) Oral at bedtime  azithromycin  IVPB      azithromycin  IVPB 500 milliGRAM(s) IV Intermittent every 24 hours  cefTRIAXone Injectable. 1000 milliGRAM(s) IV Push every 24 hours  chlorhexidine 2% Cloths 1 Application(s) Topical <User Schedule>  clopidogrel Tablet 75 milliGRAM(s) Oral daily  dextrose 5%. 1000 milliLiter(s) (100 mL/Hr) IV Continuous <Continuous>  dextrose 5%. 1000 milliLiter(s) (50 mL/Hr) IV Continuous <Continuous>  dextrose 50% Injectable 25 Gram(s) IV Push once  dextrose 50% Injectable 12.5 Gram(s) IV Push once  dextrose 50% Injectable 25 Gram(s) IV Push once  ferrous    sulfate 325 milliGRAM(s) Oral daily  glucagon  Injectable 1 milliGRAM(s) IntraMuscular once  heparin   Injectable 5000 Unit(s) SubCutaneous every 8 hours  insulin lispro (ADMELOG) corrective regimen sliding scale   SubCutaneous at bedtime  insulin lispro (ADMELOG) corrective regimen sliding scale   SubCutaneous three times a day before meals  lactated ringers. 1000 milliLiter(s) (75 mL/Hr) IV Continuous <Continuous>  methylPREDNISolone sodium succinate Injectable 40 milliGRAM(s) IV Push every 8 hours  norepinephrine Infusion 0.05 MICROgram(s)/kG/Min (5.62 mL/Hr) IV Continuous <Continuous>  pantoprazole  Injectable 40 milliGRAM(s) IV Push daily  remdesivir  IVPB   IV Intermittent   remdesivir  IVPB 100 milliGRAM(s) IV Intermittent every 24 hours    MEDICATIONS  (PRN):  acetaminophen     Tablet .. 650 milliGRAM(s) Oral every 6 hours PRN Mild Pain (1 - 3)  aluminum hydroxide/magnesium hydroxide/simethicone Suspension 30 milliLiter(s) Oral every 4 hours PRN Dyspepsia  dextrose Oral Gel 15 Gram(s) Oral once PRN Blood Glucose LESS THAN 70 milliGRAM(s)/deciliter  ondansetron Injectable 4 milliGRAM(s) IV Push every 6 hours PRN Nausea and/or Vomiting    Home Medications:  albuterol 2.5 mg/3 mL (0.083%) inhalation solution: 3 milliliter(s) by nebulizer 2 times a day (26 Feb 2024 13:29)  aspirin 81 mg oral delayed release tablet: 1 tab(s) orally once a day (26 Feb 2024 13:28)  atorvastatin 10 mg oral tablet: 1 tab(s) orally once a day (26 Feb 2024 13:28)  budesonide 0.5 mg/2 mL inhalation suspension: 2 milliliter(s) by nebulizer 2 times a day ***use after albuterol*** (26 Feb 2024 13:29)  cholecalciferol 25 mcg (1000 intl units) oral tablet: 1 tab(s) orally once a day (26 Feb 2024 13:27)  Entresto 24 mg-26 mg oral tablet: 1 tab(s) orally 2 times a day (26 Feb 2024 13:28)  ferrous sulfate 325 mg (65 mg elemental iron) oral tablet: 1 tab(s) orally once a day (26 Feb 2024 13:28)  predniSONE 10 mg oral tablet: 1 tab(s) orally once a day Take 4 tabs daily for 3 days, then 3 tabs daily for 3 days, then 2 tabs daily for 3 days, then 1 tab daily for 3 days then stop. ***Course Complete*** (26 Feb 2024 13:28)

## 2024-02-27 NOTE — DIETITIAN INITIAL EVALUATION ADULT - NSFNSPHYEXAMSKINFT_GEN_A_CORE
Pressure Injury 1: sacrum, Stage I  Pressure Injury 2: Left:, Right:, heel, Stage I    Anthony Score 11.

## 2024-02-27 NOTE — PROGRESS NOTE ADULT - ASSESSMENT
89 y/o Male with PMHx of Arthritis, DVT, ILD, COPD (On Home O2), CAD (s/p PCI), HFpEF (EF 55%) presents to  ED complaining of shortness of breath with:       1. Acute Metabolic Encephalopathy   2. Acute Hypoxic Respiratory Failure   2. Acute COPD v ILD Exacerbation   3. COVID   4. Multifocal Pneumonia   5. Distributive Shock   6. KYRA  7. Metabolic Acidosis         Neuro: Stuporous, moans/ grunts ot physical stimuli. Mentation likely multifactorial in setting of uremia v acute underlying infectious etiology. s/p Ativan in ED. Avoid further delirogenic medications. No indication for Precedex gtt at this time. If no improvement in mentation, will obtain CT Head.   Resp: Tachypneic, hypoxic with profound work of breathing on arrival. Initiated on NRB with minimal improvement in oxygenation. Transitioned to HFNC however with persistent work of breathing. Upgraded to NIPPV. Actively titrating FiO2 as tolerated. Start on DuoNebs Q6hrs. C/W Steroids given ILD V COPD exacerbation. Aspiration precautions. High risk for rapid decompensation requiring emergent intubation.   CV: Hypotensive on arrival, s/p 2L NS in ED with adequate response. Now with worsening hypotension, given additional 1L LR with minimal improvement. Initiated on Levophed gtt, titrate to MAP >65. Clinically patients appears hypovolemic, however with B/L LE edema. Will give Albumin q6hrs x4 doses. Shock state likely multifactorial in setting of profound dehydration v acute underlying infectious etiology. TTE ordered, results pending.   GI: Keep NPO while on NIPPV. Will need S/S consult. GI prophylaxis with Protonix.  Renal: KYRA, Cr downtrending s/p hydration. Suspect secondary to ATN v pre-renal. s/p aggressive hydration in ED. Will start gentle hydration with LR @75/hr. Trend labs, replete lytes as needed to maintain K>4, Mg>2 and Phos >3.   ID: Leukocytosis, afebrile. CT Chest with Extensive ground-glass opacification and patchy opacities involving all lung lobes with interval progression compared to 2/5/2024. Differential diagnosis includes pneumonia and acute exacerbation of ILD. RVP +COVID 19. BCx, SCx, Legionella and Strep Pneumo pending. Will give dose of Vancomycin. Start on empiric coverage with Ceftriaxone and Azithromycin for possible superimposed bacterial pneumonia Continue with Remdesivir. Trend WBC and fever curve.   Heme: H/H downtrended, likely dilutional s/p aggressive hydration. No overt s/s of bleeding. Trend H/H, transfuse for hgb <7.0 if necessary or active bleeding. DVT prophylaxis with Heparin. DAPT with ASA and Plavix. SCDS in place.  87 y/o Male with PMHx of Arthritis, DVT, ILD, COPD (On Home O2), CAD (s/p PCI), HFpEF (EF 55%) presents to  ED complaining of shortness of breath with:       1. Acute Metabolic Encephalopathy   2. Acute Hypoxic Respiratory Failure   2. Acute COPD v ILD Exacerbation   3. COVID   4. Multifocal Pneumonia   5. Distributive Shock   6. KYRA  7. Metabolic Acidosis         Neuro: Stuporous, moans/ grunts ot physical stimuli. Mentation likely multifactorial in setting of uremia v acute underlying infectious etiology. s/p Ativan in ED. Avoid further delirogenic medications. No indication for Precedex gtt at this time. If no improvement in mentation, will obtain CT Head.   Resp: Tachypneic, hypoxic with profound work of breathing on arrival. Initiated on NRB with minimal improvement in oxygenation. Transitioned to HFNC however with persistent work of breathing. Upgraded to NIPPV. Actively titrating FiO2 as tolerated. Start on DuoNebs Q6hrs. C/W Steroids given ILD V COPD exacerbation. Aspiration precautions. High risk for rapid decompensation requiring emergent intubation.   CV: Hypotensive on arrival, s/p 2L NS in ED with adequate response. Now with worsening hypotension, given additional 1L LR with minimal improvement. Initiated on Levophed gtt, titrate to MAP >65. Clinically patients appears hypovolemic, however with B/L LE edema. Will give Albumin q6hrs x4 doses. Shock state likely multifactorial in setting of profound dehydration v acute underlying infectious etiology. TTE ordered, results pending.   GI: Keep NPO while on NIPPV. Will need S/S consult. GI prophylaxis with Protonix.  Renal: KYRA, Cr downtrending s/p hydration. Suspect secondary to ATN v pre-renal. s/p aggressive hydration in ED. Will start gentle hydration with LR @75/hr. Metabolic acidosis, likely multifactorial in setting of hyperchloremia v uremia. Sage catheter in place. Adequate urine output. No urgent indication for HD at this time. Serial BMPs. Trend labs, replete lytes as needed to maintain K>4, Mg>2 and Phos >3.   ID: Leukocytosis, afebrile. CT Chest with Extensive ground-glass opacification and patchy opacities involving all lung lobes with interval progression compared to 2/5/2024. Differential diagnosis includes pneumonia and acute exacerbation of ILD. RVP +COVID 19. BCx, SCx, Legionella and Strep Pneumo pending. Will give dose of Vancomycin. Start on empiric coverage with Ceftriaxone and Azithromycin for possible superimposed bacterial pneumonia Continue with Remdesivir. Trend WBC and fever curve.   Heme: H/H downtrended, likely dilutional s/p aggressive hydration. No overt s/s of bleeding. Trend H/H, transfuse for hgb <7.0 if necessary or active bleeding. DVT prophylaxis with Heparin. DAPT with ASA and Plavix. SCDS in place.

## 2024-02-27 NOTE — CONSULT NOTE ADULT - SUBJECTIVE AND OBJECTIVE BOX
Patient is a 88y old  Male who presents with a chief complaint of     HPI:  ·  87 y/o male with PMHx of arthritis, DVT, ILD/ Copd on home Oxygen, CAD s/p PCI in the past year on DAPT, Mild diastolic Chf, EF 55%,  presents to the ED with wife c/o SOB. Patient endorses a cough for the last 2 weeks, states that the SOB worsened today. Denies fevers, vomiting, sputum production, vomiting, diarrhea, or chest pain.  Patient had Covid in Dec 2023, treated at home with Paxlovid  and never quite recover; he has been getting progressively more dyspneic and he came to the ED for evaluation   Also had an admission 2weeks ago for AECopd  -found to have bilateral Groud Glass infiltrates and +COVID test     PAST MEDICAL HISTORY:  Arthritis   Deep vein thrombosis (DVT) Right leg 2016   DVT (deep venous thrombosis)   [place] (hard of hearing) uses hearing aid  Left carpal tunnel syndrome   Squamous cell carcinoma.     PAST SURGICAL HISTORY:  H/O colonoscopy 2014  H/O inguinal hernia repair 2017 right  H/O squamous cell carcinoma excision Bilateral ear 2018  History of carpal tunnel surgery of right wrist 2019  S/P arthroscopy of right shoulder 2019  S/P cataract surgery 2009 bilateral  S/P foot surgery, right 1968.     FAMILY HISTORY:  FH: bladder cancer, father  FHx: uterine cancer, sister.      PAST MEDICAL & SURGICAL HISTORY:  Squamous cell carcinoma      [place] (hard of hearing)  uses hearing aid      Arthritis      Deep vein thrombosis (DVT)  Right leg 2016 ? unknown cause      Left carpal tunnel syndrome      DVT (deep venous thrombosis)      H/O inguinal hernia repair  2017 right      H/O colonoscopy  2014      H/O squamous cell carcinoma excision  Bilateral ear 2018      S/P cataract surgery  2009 bilateral      S/P foot surgery, right  1968      S/P arthroscopy of right shoulder  2019      History of carpal tunnel surgery of right wrist  2019          PREVIOUS DIAGNOSTIC TESTING:      MEDICATIONS  (STANDING):  albumin human 25% IVPB 100 milliLiter(s) IV Intermittent every 6 hours  albuterol/ipratropium for Nebulization 3 milliLiter(s) Nebulizer every 6 hours  aspirin enteric coated 81 milliGRAM(s) Oral daily  atorvastatin 10 milliGRAM(s) Oral at bedtime  azithromycin  IVPB      azithromycin  IVPB 500 milliGRAM(s) IV Intermittent every 24 hours  cefTRIAXone Injectable. 1000 milliGRAM(s) IV Push every 24 hours  chlorhexidine 2% Cloths 1 Application(s) Topical <User Schedule>  clopidogrel Tablet 75 milliGRAM(s) Oral daily  ferrous    sulfate 325 milliGRAM(s) Oral daily  heparin   Injectable 5000 Unit(s) SubCutaneous every 8 hours  lactated ringers. 1000 milliLiter(s) (75 mL/Hr) IV Continuous <Continuous>  methylPREDNISolone sodium succinate Injectable 40 milliGRAM(s) IV Push every 8 hours  norepinephrine Infusion 0.05 MICROgram(s)/kG/Min (5.62 mL/Hr) IV Continuous <Continuous>  pantoprazole  Injectable 40 milliGRAM(s) IV Push daily  remdesivir  IVPB   IV Intermittent   remdesivir  IVPB 100 milliGRAM(s) IV Intermittent every 24 hours  sacubitril 24 mG/valsartan 26 mG 1 Tablet(s) Oral two times a day    MEDICATIONS  (PRN):  acetaminophen     Tablet .. 650 milliGRAM(s) Oral every 6 hours PRN Mild Pain (1 - 3)  aluminum hydroxide/magnesium hydroxide/simethicone Suspension 30 milliLiter(s) Oral every 4 hours PRN Dyspepsia  ondansetron Injectable 4 milliGRAM(s) IV Push every 6 hours PRN Nausea and/or Vomiting      FAMILY HISTORY:  FH: bladder cancer  father    FHx: uterine cancer  sister        SOCIAL HISTORY:  ***    REVIEW OF SYSTEM:  Pertinent items are noted in HPI.    Vital Signs Last 24 Hrs  T(C): 37.5 (26 Feb 2024 23:19), Max: 37.9 (26 Feb 2024 17:30)  T(F): 99.5 (26 Feb 2024 23:19), Max: 100.3 (26 Feb 2024 17:30)  HR: 66 (27 Feb 2024 07:00) (60 - 121)  BP: 105/55 (27 Feb 2024 07:00) (80/45 - 149/41)  BP(mean): 72 (27 Feb 2024 07:00) (54 - 100)  RR: 14 (27 Feb 2024 07:00) (13 - 35)  SpO2: 98% (27 Feb 2024 07:00) (87% - 100%)    Parameters below as of 26 Feb 2024 19:30  Patient On (Oxygen Delivery Method): BiPAP/CPAP        I&O's Summary    26 Feb 2024 07:01  -  27 Feb 2024 07:00  --------------------------------------------------------  IN: 1695 mL / OUT: 150 mL / NET: 1545 mL      PHYSICAL EXAM  General Appearance:on NIV mode ventilation  Neck: Supple,  Lungs: bilateral velcro type crackles  Heart: Regular rate and rhythm, S1, S2 normal  Abdomen: Soft, non-tender, bowel sounds active   Extremities: no cyanosis or edema, no joint swelling  Neurologic: non focal    ECG:    LABS:                          9.8    9.93  )-----------( 191      ( 27 Feb 2024 06:17 )             30.6     02-27    141  |  114<H>  |  57<H>  ----------------------------<  180<H>  4.8   |  21<L>  |  1.39<H>    Ca    8.4<L>      27 Feb 2024 06:17  Phos  4.4     02-27  Mg     2.4     02-27    TPro  6.1  /  Alb  2.3<L>  /  TBili  0.6  /  DBili  x   /  AST  35  /  ALT  28  /  AlkPhos  114  02-27          Pro BNP  -- 02-27 @ 06:17  D Dimer  [id number] 02-27 @ 06:17  Pro BNP  -- 02-26 @ 12:14  D Dimer  775 02-26 @ 12:14    PT/INR - ( 27 Feb 2024 06:17 )   PT: 14.9 sec;   INR: 1.33 ratio           Urinalysis Basic - ( 27 Feb 2024 06:17 )    Color: x / Appearance: x / SG: x / pH: x  Gluc: 180 mg/dL / Ketone: x  / Bili: x / Urobili: x   Blood: x / Protein: x / Nitrite: x   Leuk Esterase: x / RBC: x / WBC x   Sq Epi: x / Non Sq Epi: x / Bacteria: x      ABG - ( 27 Feb 2024 06:53 )  pH, Arterial: 7.29  pH, Blood: x     /  pCO2: 36    /  pO2: 253   / HCO3: 17    / Base Excess: -8.5  /  SaO2: 100                   RADIOLOGY & ADDITIONAL STUDIES:  < from: CT Chest No Cont (02.26.24 @ 10:39) >  Sagittal and coronal reformats were performed.    FINDINGS:    LUNGS AND AIRWAYS: Extensive ground-glass opacification and patchy   opacities involving all lung lobes with airway dilatation both centrally   and peripherally and demonstrating interval progression compared to   2/5/2024; for example, there is newground-glass opacification in the   right middle lobe, which is previously aerated (image 73, series 2).  PLEURA: No pleural effusion.  MEDIASTINUM AND PIO: There are stable nonspecific mediastinal lymph   nodes, likely reactive.  VESSELS: Calcification of aorta and its branches.  HEART: Heart size is normal. No pericardial effusion. Aortic valve,   mitral annular, and coronary artery calcification.  CHEST WALL AND LOWER NECK: Within normal limits.  VISUALIZED UPPER ABDOMEN: Left renal cyst. Stable calcified right renal   artery aneurysm.  BONES: Degenerative changes.    IMPRESSION:    Extensive ground-glass opacification and patchy opacities involving all   lung lobes with interval progression compared to 2/5/2024.    Differential diagnosisincludes pneumonia and acute exacerbation of ILD   (given history of ILD)    < end of copied text >

## 2024-02-27 NOTE — DIETITIAN INITIAL EVALUATION ADULT - OTHER INFO
87 y/o M with a PMHx of arthritis, DVT, ILD/ COPD on home Oxygen, CAD s/p PCI in the past year on DAPT, Mild diastolic CHF, EF 55%, presented to the ED with wife c/o SOB. Patient endorses a cough for the last 2 weeks, states that the SOB worsened today. Patient had Covid in Dec 2023, treated at home with Paxlovid  and never quite recover; he has been getting progressively more dyspneic and he came to the ED for evaluation. Also had an admission 2weeks ago for AECopd  -found to have bilateral Groud Glass infiltrates and +COVID test. Admitted for acute on chronic  hypoxic resp failure 2/2 +COVID-19 and ILD, and KYRA; tx to CCU for BiPAP. FULL CODE.    Unable to obtain meaningful information 2/2 pt lethargic but arousable, however appears confused at time of visit. Currently NPO while on BiPAP. RD obtained bedscale wt on 2/27 - 127#. Weight hx reviewed: 142# (taken by RD on 9/25/23; met criteria for severe malnutrition); weight loss of 15# (10.5%) x 5 mon - clinsig and severe. NFPE reveals moderate to severe muscle/ fat wasting, pt continues to meet criteria for PCM at this time. Recommend to advance diet to Regular as soon as medically feasible. Consider SLP eval to assess for safest/ least restrictive diet consistency/ texture when diet is able to be advanced. HgbA1c level 6.8% indicating elevated glycemic control; however pt likely with very poor po intake and will monitor BG levels and adjust diet regimen accordingly. Will trial Ensure Plus High Protein BID when diet is advanced. If pt's diet cannot be advanced within the 24-48 hours, consider initiating nutrition support. Confirm goals of care regarding nutrition support. Please see additional recommendations below.

## 2024-02-27 NOTE — DIETITIAN INITIAL EVALUATION ADULT - NSFNSGIIOFT_GEN_A_CORE
I&O's Detail    26 Feb 2024 07:01  -  27 Feb 2024 07:00  --------------------------------------------------------  IN:    IV PiggyBack: 100 mL    Lactated Ringers: 525 mL    Lactated Ringers Bolus: 1000 mL    Norepinephrine: 70 mL  Total IN: 1695 mL    OUT:    Incontinent per Condom Catheter (mL): 150 mL  Total OUT: 150 mL    Total NET: 1545 mL

## 2024-02-27 NOTE — DIETITIAN INITIAL EVALUATION ADULT - NSICDXPASTMEDICALHX_GEN_ALL_CORE_FT
PAST MEDICAL HISTORY:  Arthritis     Deep vein thrombosis (DVT) Right leg 2016 ? unknown cause    DVT (deep venous thrombosis)     [place] (hard of hearing) uses hearing aid    Left carpal tunnel syndrome     Squamous cell carcinoma

## 2024-02-27 NOTE — DIETITIAN INITIAL EVALUATION ADULT - ETIOLOGY
r/t decreased ability to meet increased nutrient needs 2/2 acute on chronic  hypoxic resp failure 2/2 +COVID-19 and ILD

## 2024-02-27 NOTE — DIETITIAN INITIAL EVALUATION ADULT - HEIGHT FOR BMI (FEET)
5
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

## 2024-02-27 NOTE — CONSULT NOTE ADULT - ASSESSMENT
89 y/o male with PMHx of arthritis, DVT, ILD/ Copd on home Oxygen, CAD s/p PCI in the past year on DAPT, Mild diastolic Chf, EF 55%,  presents to the ED with wife c/o SOB. Patient endorses a cough for the last 2 weeks, states that the SOB worsened today. Denies fevers, vomiting, sputum production, vomiting, diarrhea, or chest pain.  Patient had Covid in Dec 2023, treated at home with Paxlovid  and never quite recover; he has been getting progressively more dyspneic and he came to the ED for evaluation   Also had an admission 2weeks ago for AECopd  -found to have bilateral Groud Glass infiltrates and +COVID test     Assessment / plan;  Acute hypoxemic resp failure  Suspected Hypersensitivity pneumonitis / ILD  Likely flare up / exacerbation / pneumonitis causing worsened resp sxs  COVID infection / pneumonia s cont factor  COPD with mild bronchospasm  Mild diastolic CHF    agree with steroids  remedesevir  broad spectrum antibiotic coverage  will fu with you  prognosis remains guarded    
A:    88M  HD # 1  FULL CODE    Here for:    1. acute on chronic resp failure 2/2  2. COVID-19 PNA  3, ILD]  4. KYRA on CKD  5. Dehydration    This patient requires critical care for support of one or more vital organ systems with a high probability of imminent or life threatening deterioration in his/her condition    P:    acute on chronic resp failure requiring HFNC  2/2 COVID, also in setting of ILD; ? severe COVID PNA +/- ILD exacerbation    HFNC 50/100, titrate as able; add NRB to maintain Sp02 > 87% and alleviate WOB  RDV  Solumedrol 40mg IV TID  Diet as able  Pulm consult ([facility] pulmonologist)  VTE ppx PUD ppx  No s/s active bleeding  Elevated ddimer, obtain B/L LE LE duplex  KYRA on CKD; gently hydrate, f/u UOP  Does not appear volume overloaded, actually dry on exam    Dispo: Cont critical care.    Date of entry of this note is equal to the date of services rendered    TOTAL CRITICAL CARE TIME: 110 minutes  (EXCLUSIVE of any non bundled procedures)    Note: This is a critically ill patient. Time spent has been in salvage of life, limb and vital organ systems. This time INCLUDES time spent directly as this patient's bedside with evaluation and management, review of chart including review of laboratory and imaging studies, interpretation of vital signs and cardiac output measurements, any necessary ventilator management, and time spent discussing plan of care with patient and family, including goals of care discussion. This also includes time spent in multidisciplinary discussion with care team and various consultants to optimize treatment plan. This time may NOT include various procedures, which will be noted seperately.   
Process of Care  --Reviewed dx/treatment problems and alignment with Goals of Care    Physical Aspects of Care  --Pain  patient denies at this time  c/w current managment    --Bowel Regimen  denies constipation  risk for constipation d/t immobility  daily dulcolax    --Dyspnea  comfortable and in NAD on bipap  transition to hiflow   Consider Morphine 1mg IV [id number] prn for severe dyspnea      Delirium ppx  -Initiate melatonin 5mg qhs, to promote maintenance of circadian rhythm/restful sleep, and for ppx of future susceptibility to delirium upon resolution of presenting condition.  -Avoid deliriogenic agents including BZD, anticholinergics, and sedating agents if possible  -Re-orient frequently, encourage family at bedside as able.   -Early mobilization recommended if feasible.     --Nausea Vomiting  denies    --Weakness  PT as tolerated     Psychological and Psychiatric Aspects of Care:   --Greif/Bereavment: emotional support provided  --Hx of psychiatric dx: none  -Pastoral Care Available PRN     Social Aspects of Care  -SW involved     Cultural Aspects  -Primary Language: English    Goals of Care:   as above  considering DNR DNI   will sign later otnight.       Prognosis: Death can occur at anytime, but if disease continues to progress naturally patient likely has days to weeks.    Ethical and Legal Aspects:   NA        Capacity: simple  HCP/Surrogate:  wife Katie  Code Status: full code  MOLST:  Dispo Plan:    Discussed With: Case coordinated with attending and SW and RN     Time Spent: 90 minutes including the care, coordination and counseling of this patient, 50% of which was spent coordinating and counseling.

## 2024-02-28 NOTE — PROGRESS NOTE ADULT - TREATMENT GUIDELINES
DNR/Comfort measures only/Do not re-hospitalize/No blood draws/No artificial nutrition/No antibiotics

## 2024-02-28 NOTE — PROGRESS NOTE ADULT - ASSESSMENT
Assessment:  Mr. Baires is a 89 YO M w/pmhx of ILD, COPD on home o2, CAD, HFpEF admitted to CCU for acute hypoxic respiratory failure 2/2 ILD exacerbation, ILD exacerbation, and possible superimposed bacterial pneumonia c/b septic shock     Problem List:  1. Acute hypoxic respiratory failure  2. COVID-19  3. Pneumonia  4. ILD exacerbation   5. Septic shock   6. ATN     Plan:  Neuro: Monitor mental status, avoid deliriogenic medications. Pain & fever control as needed    CV: Shock state, likely septic. Actively titrating levophed to maintain MAP>65mmHg     Pulm: Respiratory status worsening, on 100% FiO2 on NIPPV. Family called, discussed next steps of intubation. They will not want intubation and CPR. steroids and abx     Renal: ATN, likely sepsis Strict I/Os, goal UOP >0.5cc/kg/hr. Trend renal function and electrolytes, replete as needed. Avoid nephrotoxic agents    GI: NPO on NIPPV. PPI     ID: Broad spectrum coverage. Remdesivir for COVID-19. Trend WBC/fevers/procalcitonin. F/u cultures     Heme: HSQ for DVT prophylaxis    Endo: Goal blood glucose <180     NOW DNR DNI, MOLST FILLED AND PLACED IN CHART    CRITICAL CARE TIME SPENT: 41 minutes assessing presenting problems of acute illness, which pose high probability of life threatening deterioration or end organ damage/dysfunction, as well as medical decision making including initiating plan of care, reviewing data, reviewing radiologic exams, discussing with multidisciplinary team,  discussing goals of care with patient/family, and writing this note.  Non-inclusive of procedures performed  Date of entry of this note is equal to the date of services rendered

## 2024-02-28 NOTE — PROGRESS NOTE ADULT - SUBJECTIVE AND OBJECTIVE BOX
Interval History:      Patient now DNR/DNI      desaturated overnight was on AVAPS overnight      alert,       creatinine  1.2, slightly improved     on 0.06 Levophed      Echo ef 55%, ivc not collapsing with inspiration    HPI:       87 y/o Male with PMHx of  ILD, COPD (On Home O2), CAD (s/p PCI), HFpEF (EF 55%) presents to  ED complaining of shortness of breath.  and cough x1 week. Of note, patient diagnosed with COVID 19 December 2023 and treated outpatient with Paxlovid.  was admitted in February for ILD exascerbation at which time he tested negative for covid. Now again presents with sob, worsening cxr.  Admitted to Medicine for acute hypoxic respiratory failure secondary to ILD/COPD exacerbation in setting of COVID 19.   intermittently on bipap vs. high flow, episodes of desaturation    Review of Systems:    [X] Unable to obtain secondary to current mental status/ medical condition.     MEDICATIONS  (STANDING):  albuterol/ipratropium for Nebulization 3 milliLiter(s) Nebulizer every 6 hours  aspirin enteric coated 81 milliGRAM(s) Oral daily  atorvastatin 10 milliGRAM(s) Oral at bedtime  azithromycin  IVPB      azithromycin  IVPB 500 milliGRAM(s) IV Intermittent every 24 hours  cefTRIAXone Injectable. 1000 milliGRAM(s) IV Push every 24 hours  chlorhexidine 2% Cloths 1 Application(s) Topical <User Schedule>  clopidogrel Tablet 75 milliGRAM(s) Oral daily  dextrose 5%. 1000 milliLiter(s) (50 mL/Hr) IV Continuous <Continuous>  dextrose 5%. 1000 milliLiter(s) (100 mL/Hr) IV Continuous <Continuous>  dextrose 50% Injectable 25 Gram(s) IV Push once  dextrose 50% Injectable 12.5 Gram(s) IV Push once  dextrose 50% Injectable 25 Gram(s) IV Push once  ferrous    sulfate 325 milliGRAM(s) Oral daily  glucagon  Injectable 1 milliGRAM(s) IntraMuscular once  heparin   Injectable 5000 Unit(s) SubCutaneous every 8 hours  insulin lispro (ADMELOG) corrective regimen sliding scale   SubCutaneous at bedtime  insulin lispro (ADMELOG) corrective regimen sliding scale   SubCutaneous three times a day before meals  lactated ringers. 1000 milliLiter(s) (75 mL/Hr) IV Continuous <Continuous>  methylPREDNISolone sodium succinate Injectable 40 milliGRAM(s) IV Push every 8 hours  norepinephrine Infusion 0.05 MICROgram(s)/kG/Min (5.62 mL/Hr) IV Continuous <Continuous>  pantoprazole  Injectable 40 milliGRAM(s) IV Push daily  remdesivir  IVPB   IV Intermittent   remdesivir  IVPB 100 milliGRAM(s) IV Intermittent every 24 hours    MEDICATIONS  (PRN):  acetaminophen     Tablet .. 650 milliGRAM(s) Oral every 6 hours PRN Mild Pain (1 - 3)  aluminum hydroxide/magnesium hydroxide/simethicone Suspension 30 milliLiter(s) Oral every 4 hours PRN Dyspepsia  dextrose Oral Gel 15 Gram(s) Oral once PRN Blood Glucose LESS THAN 70 milliGRAM(s)/deciliter  ondansetron Injectable 4 milliGRAM(s) IV Push every 6 hours PRN Nausea and/or Vomiting    ICU Vital Signs Last 24 Hrs  T(C): 36.1 (28 Feb 2024 05:00), Max: 36.6 (27 Feb 2024 20:26)  T(F): 97 (28 Feb 2024 05:00), Max: 97.9 (27 Feb 2024 20:26)  HR: 67 (28 Feb 2024 05:45) (58 - 116)  BP: 111/64 (28 Feb 2024 05:30) (100/54 - 122/63)  BP(mean): 80 (28 Feb 2024 05:30) (66 - 92)  ABP: --  ABP(mean): --  RR: 25 (28 Feb 2024 05:30) (14 - 37)  SpO2: 97% (28 Feb 2024 05:45) (78% - 100%)    O2 Parameters below as of 28 Feb 2024 05:00  Patient On (Oxygen Delivery Method): Avaps-rate 16,  Pressure 12-18    O2 Concentration (%): 100    Physical Exam    General - lethargic  HEENT - bipap on , mucous membranes dry  Neck - supple  lungs bilateral crackles - cxr bilateral infiltrates  cv - rrr  abdomen - soft, non tender   voiding  extremieis warm  neuro - lethargic , non focal    I&O's Summary    27 Feb 2024 07:01  -  28 Feb 2024 07:00  --------------------------------------------------------  IN: 2089.2 mL / OUT: 1100 mL / NET: 989.2 mL                        9.4    14.21 )-----------( 200      ( 28 Feb 2024 05:28 )             29.8       02-28    144  |  114<H>  |  54<H>  ----------------------------<  152<H>  5.0   |  23  |  1.26    Ca    8.6      28 Feb 2024 05:28  Phos  4.4     02-27  Mg     2.4     02-27    TPro  6.0  /  Alb  3.0<L>  /  TBili  0.6  /  DBili  x   /  AST  24  /  ALT  19  /  AlkPhos  133<H>  02-28    ABG - ( 27 Feb 2024 06:53 )  pH, Arterial: 7.29  pH, Blood: x     /  pCO2: 36    /  pO2: 253   / HCO3: 17    / Base Excess: -8.5  /  SaO2: 100       Urinalysis Basic - ( 28 Feb 2024 05:28 )    Color: x / Appearance: x / SG: x / pH: x  Gluc: 152 mg/dL / Ketone: x  / Bili: x / Urobili: x   Blood: x / Protein: x / Nitrite: x   Leuk Esterase: x / RBC: x / WBC x   Sq Epi: x / Non Sq Epi: x / Bacteria: x      DVT Prophylaxis:   Heparin subq                                                             Advanced Directives: Full Code         Labs, Notes, Orders, radiologic studies reviewed and care coordinated with multidisciplinary team

## 2024-02-28 NOTE — PROGRESS NOTE ADULT - NUTRITIONAL ASSESSMENT
This patient has been assessed with a concern for Malnutrition and has been determined to have a diagnosis/diagnoses of Severe protein-calorie malnutrition.    This patient is being managed with:   Diet NPO-  Entered: Feb 27 2024  4:21AM  
This patient has been assessed with a concern for Malnutrition and has been determined to have a diagnosis/diagnoses of Severe protein-calorie malnutrition.    This patient is being managed with:   Diet NPO-  Entered: Feb 27 2024  4:21AM

## 2024-02-28 NOTE — PROGRESS NOTE ADULT - ASSESSMENT
87 y/o male with PMHx of arthritis, DVT, ILD/ Copd on home Oxygen, CAD s/p PCI in the past year on DAPT, Mild diastolic Chf, EF 55%,  presents to the ED with wife c/o SOB. Patient endorses a cough for the last 2 weeks, states that the SOB worsened today. Denies fevers, vomiting, sputum production, vomiting, diarrhea, or chest pain.  Patient had Covid in Dec 2023, treated at home with Paxlovid  and never quite recover; he has been getting progressively more dyspneic and he came to the ED for evaluation   Also had an admission 2weeks ago for AECopd  -found to have bilateral Groud Glass infiltrates and +COVID test     Assessment / plan;  Acute hypoxemic resp failure  Suspected Hypersensitivity pneumonitis / ILD  Likely flare up / exacerbation / pneumonitis causing worsened resp sxs  COVID infection / pneumonia s cont factor  COPD with mild bronchospasm  Mild diastolic CHF  hemodynamic instability required pressors  time spent taking care of critically ill 30 mins    agree with steroids  remedesevir Rx  broad spectrum antibiotic coverage  prognosis remains guarded  family decide for DNR/ DNI status  will reach out to his wife today for discussions  data discussed with the intensivist today  all recent films reviewed and discussed with ICU team    I called pt's wife this morning and updated her regarding current status, all her questions are answered.  She's very emotional and crying on the phone, plans to visit with her son later this morning.

## 2024-02-28 NOTE — PROGRESS NOTE ADULT - SUBJECTIVE AND OBJECTIVE BOX
Patient is a 88y old  Male who presents with a chief complaint of     HPI:  ·  87 y/o male with PMHx of arthritis, DVT, ILD/ Copd on home Oxygen, CAD s/p PCI in the past year on DAPT, Mild diastolic Chf, EF 55%,  presents to the ED with wife c/o SOB. Patient endorses a cough for the last 2 weeks, states that the SOB worsened today. Denies fevers, vomiting, sputum production, vomiting, diarrhea, or chest pain.  Patient had Covid in Dec 2023, treated at home with Paxlovid  and never quite recover; he has been getting progressively more dyspneic and he came to the ED for evaluation   Also had an admission 2weeks ago for AECopd  -found to have bilateral Groud Glass infiltrates and +COVID test     PAST MEDICAL HISTORY:  Arthritis   Deep vein thrombosis (DVT) Right leg 2016   DVT (deep venous thrombosis)   [place] (hard of hearing) uses hearing aid  Left carpal tunnel syndrome   Squamous cell carcinoma.     PAST SURGICAL HISTORY:  H/O colonoscopy 2014  H/O inguinal hernia repair 2017 right  H/O squamous cell carcinoma excision Bilateral ear 2018  History of carpal tunnel surgery of right wrist 2019  S/P arthroscopy of right shoulder 2019  S/P cataract surgery 2009 bilateral  S/P foot surgery, right 1968.     FAMILY HISTORY:  FH: bladder cancer, father  FHx: uterine cancer, sister.      PAST MEDICAL & SURGICAL HISTORY:  Squamous cell carcinoma      [place] (hard of hearing)  uses hearing aid      Arthritis      Deep vein thrombosis (DVT)  Right leg 2016 ? unknown cause      Left carpal tunnel syndrome      DVT (deep venous thrombosis)      H/O inguinal hernia repair  2017 right      H/O colonoscopy  2014      H/O squamous cell carcinoma excision  Bilateral ear 2018      S/P cataract surgery  2009 bilateral      S/P foot surgery, right  1968      S/P arthroscopy of right shoulder  2019      History of carpal tunnel surgery of right wrist  2019          PREVIOUS DIAGNOSTIC TESTING:      MEDICATIONS  (STANDING):  albuterol/ipratropium for Nebulization 3 milliLiter(s) Nebulizer every 6 hours  aspirin enteric coated 81 milliGRAM(s) Oral daily  atorvastatin 10 milliGRAM(s) Oral at bedtime  azithromycin  IVPB      azithromycin  IVPB 500 milliGRAM(s) IV Intermittent every 24 hours  cefTRIAXone Injectable. 1000 milliGRAM(s) IV Push every 24 hours  chlorhexidine 2% Cloths 1 Application(s) Topical <User Schedule>  clopidogrel Tablet 75 milliGRAM(s) Oral daily  dextrose 5%. 1000 milliLiter(s) (100 mL/Hr) IV Continuous <Continuous>  dextrose 5%. 1000 milliLiter(s) (50 mL/Hr) IV Continuous <Continuous>  dextrose 50% Injectable 25 Gram(s) IV Push once  dextrose 50% Injectable 12.5 Gram(s) IV Push once  dextrose 50% Injectable 25 Gram(s) IV Push once  ferrous    sulfate 325 milliGRAM(s) Oral daily  glucagon  Injectable 1 milliGRAM(s) IntraMuscular once  heparin   Injectable 5000 Unit(s) SubCutaneous every 8 hours  insulin lispro (ADMELOG) corrective regimen sliding scale   SubCutaneous at bedtime  insulin lispro (ADMELOG) corrective regimen sliding scale   SubCutaneous three times a day before meals  lactated ringers. 1000 milliLiter(s) (75 mL/Hr) IV Continuous <Continuous>  methylPREDNISolone sodium succinate Injectable 40 milliGRAM(s) IV Push every 8 hours  norepinephrine Infusion 0.05 MICROgram(s)/kG/Min (5.62 mL/Hr) IV Continuous <Continuous>  pantoprazole  Injectable 40 milliGRAM(s) IV Push daily  remdesivir  IVPB   IV Intermittent   remdesivir  IVPB 100 milliGRAM(s) IV Intermittent every 24 hours    MEDICATIONS  (PRN):  acetaminophen     Tablet .. 650 milliGRAM(s) Oral every 6 hours PRN Mild Pain (1 - 3)  aluminum hydroxide/magnesium hydroxide/simethicone Suspension 30 milliLiter(s) Oral every 4 hours PRN Dyspepsia  ondansetron Injectable 4 milliGRAM(s) IV Push every 6 hours PRN Nausea and/or Vomiting      FAMILY HISTORY:  FH: bladder cancer  father    FHx: uterine cancer  sister      ICU Vital Signs Last 24 Hrs  T(C): 36.1 (28 Feb 2024 05:00), Max: 36.6 (27 Feb 2024 20:26)  T(F): 97 (28 Feb 2024 05:00), Max: 97.9 (27 Feb 2024 20:26)  HR: 67 (28 Feb 2024 05:45) (58 - 116)  BP: 111/64 (28 Feb 2024 05:30) (100/54 - 122/63)  BP(mean): 80 (28 Feb 2024 05:30) (66 - 92)  ABP: --  ABP(mean): --  RR: 25 (28 Feb 2024 05:30) (14 - 37)  SpO2: 97% (28 Feb 2024 05:45) (78% - 100%)    O2 Parameters below as of 28 Feb 2024 05:00  Patient On (Oxygen Delivery Method): Avaps-rate 16,  Pressure 12-18    I&O's Detail    27 Feb 2024 07:01  -  28 Feb 2024 07:00  --------------------------------------------------------  IN:    IV PiggyBack: 450 mL    Lactated Ringers: 1466 mL    Norepinephrine: 173.2 mL  Total IN: 2089.2 mL    OUT:    Incontinent per Condom Catheter (mL): 1100 mL  Total OUT: 1100 mL    Total NET: 989.2 mL          PHYSICAL EXAM  General Appearance:on NIV mode ventilation  Neck: Supple,  Lungs: bilateral velcro type crackles  Heart: Regular rate and rhythm, S1, S2 normal  Abdomen: Soft, non-tender, bowel sounds active   Extremities: no cyanosis or edema, no joint swelling  Neurologic: non focal    ECG:    LABS:             ABG - ( 27 Feb 2024 06:53 )  pH, Arterial: 7.29  pH, Blood: x     /  pCO2: 36    /  pO2: 253   / HCO3: 17    / Base Excess: -8.5  /  SaO2: 100                             9.4    14.21 )-----------( 200      ( 28 Feb 2024 05:28 )             29.8   02-28    144  |  114<H>  |  54<H>  ----------------------------<  152<H>  5.0   |  23  |  1.26    Ca    8.6      28 Feb 2024 05:28  Phos  4.4     02-27  Mg     2.4     02-27    TPro  6.0  /  Alb  3.0<L>  /  TBili  0.6  /  DBili  x   /  AST  24  /  ALT  19  /  AlkPhos  133<H>  02-28                     9.8    9.93  )-----------( 191      ( 27 Feb 2024 06:17 )             30.6     02-27    141  |  114<H>  |  57<H>  ----------------------------<  180<H>  4.8   |  21<L>  |  1.39<H>    Ca    8.4<L>      27 Feb 2024 06:17  Phos  4.4     02-27  Mg     2.4     02-27    TPro  6.1  /  Alb  2.3<L>  /  TBili  0.6  /  DBili  x   /  AST  35  /  ALT  28  /  AlkPhos  114  02-27          Pro BNP  -- 02-27 @ 06:17  D Dimer  [id number] 02-27 @ 06:17  Pro BNP  -- 02-26 @ 12:14  D Dimer  775 02-26 @ 12:14    PT/INR - ( 27 Feb 2024 06:17 )   PT: 14.9 sec;   INR: 1.33 ratio           Urinalysis Basic - ( 27 Feb 2024 06:17 )    Color: x / Appearance: x / SG: x / pH: x  Gluc: 180 mg/dL / Ketone: x  / Bili: x / Urobili: x   Blood: x / Protein: x / Nitrite: x   Leuk Esterase: x / RBC: x / WBC x   Sq Epi: x / Non Sq Epi: x / Bacteria: x      ABG - ( 27 Feb 2024 06:53 )  pH, Arterial: 7.29  pH, Blood: x     /  pCO2: 36    /  pO2: 253   / HCO3: 17    / Base Excess: -8.5  /  SaO2: 100                   RADIOLOGY & ADDITIONAL STUDIES:  < from: CT Chest No Cont (02.26.24 @ 10:39) >  Sagittal and coronal reformats were performed.    FINDINGS:    LUNGS AND AIRWAYS: Extensive ground-glass opacification and patchy   opacities involving all lung lobes with airway dilatation both centrally   and peripherally and demonstrating interval progression compared to   2/5/2024; for example, there is newground-glass opacification in the   right middle lobe, which is previously aerated (image 73, series 2).  PLEURA: No pleural effusion.  MEDIASTINUM AND PIO: There are stable nonspecific mediastinal lymph   nodes, likely reactive.  VESSELS: Calcification of aorta and its branches.  HEART: Heart size is normal. No pericardial effusion. Aortic valve,   mitral annular, and coronary artery calcification.  CHEST WALL AND LOWER NECK: Within normal limits.  VISUALIZED UPPER ABDOMEN: Left renal cyst. Stable calcified right renal   artery aneurysm.  BONES: Degenerative changes.    IMPRESSION:    Extensive ground-glass opacification and patchy opacities involving all   lung lobes with interval progression compared to 2/5/2024.    Differential diagnosisincludes pneumonia and acute exacerbation of ILD   (given history of ILD)    < end of copied text >

## 2024-02-28 NOTE — PROGRESS NOTE ADULT - CONVERSATION DETAILS
Long discussion with family at this stage, we are recommending comfort measures as patient continues to decline regardless of maximal support.  We discussed prolonging suffering rather than prolonging life.     We discussed what that would mean,  no longer doing blood tests, dx imaging, abx, and palliative extubation to RA with comfort medications to address any symptoms that may arise. This would mean shortened life span but would focus on comfort and quality at the end of life.       We discussed weaning off BiPAP into more comfort approach.     Weaning w. morphine 4mg to and will place on HFNC   pt wants to attempt to drink fluids

## 2024-02-28 NOTE — PROGRESS NOTE ADULT - SUBJECTIVE AND OBJECTIVE BOX
Date of entry of this note is equal to the date of services rendered   Patient is a 88y old  Male who presents with a chief complaint of Infection due to severe acute respiratory syndrome coronavirus 2 (SARS-CoV-2)     (27 Feb 2024 12:22)    BRIEF HOSPITAL COURSE:   Mr. Baires is a 89 YO M w/pmhx of ILD, COPD on home o2, CAD, HFpEF admitted to CCU for acute hypoxic respiratory failure 2/2 ILD exacerbation, ILD exacerbation, and possible superimposed bacterial pneumonia c/b septic shock     Events last 24 hours:   - Worsening o2 saturation tonight, dipped into 70s, required initiation of AVAPS   - On levophed support  - Patient made DNR/DNI by family     Review of Systems:  Unable to assess given clinical condition     PAST MEDICAL & SURGICAL HISTORY:  Squamous cell carcinoma  [place] (hard of hearing)  uses hearing aid  Arthritis  Deep vein thrombosis (DVT)  Right leg 2016 ? unknown cause  Left carpal tunnel syndrome  DVT (deep venous thrombosis)  H/O inguinal hernia repair  2017 right  H/O colonoscopy  2014  H/O squamous cell carcinoma excision  Bilateral ear 2018  S/P cataract surgery  2009 bilateral  S/P foot surgery, right  1968  S/P arthroscopy of right shoulder  2019  History of carpal tunnel surgery of right wrist  2019    Medications:  azithromycin  IVPB 500 milliGRAM(s) IV Intermittent every 24 hours  azithromycin  IVPB      cefTRIAXone Injectable. 1000 milliGRAM(s) IV Push every 24 hours  remdesivir  IVPB   IV Intermittent   remdesivir  IVPB 100 milliGRAM(s) IV Intermittent every 24 hours  norepinephrine Infusion 0.05 MICROgram(s)/kG/Min IV Continuous <Continuous>  albuterol/ipratropium for Nebulization 3 milliLiter(s) Nebulizer every 6 hours  acetaminophen     Tablet .. 650 milliGRAM(s) Oral every 6 hours PRN  ondansetron Injectable 4 milliGRAM(s) IV Push every 6 hours PRN  aspirin enteric coated 81 milliGRAM(s) Oral daily  clopidogrel Tablet 75 milliGRAM(s) Oral daily  heparin   Injectable 5000 Unit(s) SubCutaneous every 8 hours  aluminum hydroxide/magnesium hydroxide/simethicone Suspension 30 milliLiter(s) Oral every 4 hours PRN  pantoprazole  Injectable 40 milliGRAM(s) IV Push daily  atorvastatin 10 milliGRAM(s) Oral at bedtime  dextrose 50% Injectable 25 Gram(s) IV Push once  dextrose 50% Injectable 12.5 Gram(s) IV Push once  dextrose 50% Injectable 25 Gram(s) IV Push once  dextrose Oral Gel 15 Gram(s) Oral once PRN  glucagon  Injectable 1 milliGRAM(s) IntraMuscular once  insulin lispro (ADMELOG) corrective regimen sliding scale   SubCutaneous three times a day before meals  insulin lispro (ADMELOG) corrective regimen sliding scale   SubCutaneous at bedtime  methylPREDNISolone sodium succinate Injectable 40 milliGRAM(s) IV Push every 8 hours  dextrose 5%. 1000 milliLiter(s) IV Continuous <Continuous>  dextrose 5%. 1000 milliLiter(s) IV Continuous <Continuous>  ferrous    sulfate 325 milliGRAM(s) Oral daily  lactated ringers. 1000 milliLiter(s) IV Continuous <Continuous>  chlorhexidine 2% Cloths 1 Application(s) Topical <User Schedule>    ICU Vital Signs Last 24 Hrs  T(C): 36.6 (27 Feb 2024 20:26), Max: 36.6 (27 Feb 2024 20:26)  T(F): 97.9 (27 Feb 2024 20:26), Max: 97.9 (27 Feb 2024 20:26)  HR: 100 (28 Feb 2024 00:00) (58 - 116)  BP: 122/54 (28 Feb 2024 00:00) (80/45 - 122/63)  BP(mean): 76 (28 Feb 2024 00:00) (56 - 92)  RR: 25 (28 Feb 2024 00:00) (13 - 37)  SpO2: 93% (28 Feb 2024 00:00) (78% - 100%)  O2 Parameters below as of 28 Feb 2024 00:00  Patient On (Oxygen Delivery Method): Avaps rate 16, , Pressure 12-18  O2 Concentration (%): 100    ABG - ( 27 Feb 2024 06:53 )  pH, Arterial: 7.29  pH, Blood: x     /  pCO2: 36    /  pO2: 253   / HCO3: 17    / Base Excess: -8.5  /  SaO2: 100       I&O's Detail    26 Feb 2024 07:01  -  27 Feb 2024 07:00  --------------------------------------------------------  IN:    IV PiggyBack: 100 mL    Lactated Ringers: 525 mL    Lactated Ringers Bolus: 1000 mL    Norepinephrine: 70 mL  Total IN: 1695 mL    OUT:    Incontinent per Condom Catheter (mL): 150 mL  Total OUT: 150 mL    Total NET: 1545 mL    27 Feb 2024 07:01  -  28 Feb 2024 00:10  --------------------------------------------------------  IN:    IV PiggyBack: 450 mL    Lactated Ringers: 941 mL    Norepinephrine: 125.6 mL  Total IN: 1516.6 mL    OUT:    Incontinent per Condom Catheter (mL): 800 mL  Total OUT: 800 mL    Total NET: 716.6 mL    LABS:                        9.8    9.93  )-----------( 191      ( 27 Feb 2024 06:17 )             30.6     02-27    141  |  114<H>  |  57<H>  ----------------------------<  180<H>  4.8   |  21<L>  |  1.39<H>    Ca    8.4<L>      27 Feb 2024 06:17  Phos  4.4     02-27  Mg     2.4     02-27    TPro  6.1  /  Alb  2.3<L>  /  TBili  0.6  /  DBili  x   /  AST  35  /  ALT  28  /  AlkPhos  114  02-27    POCT Blood Glucose.: 197 mg/dL (27 Feb 2024 21:32)    PT/INR - ( 27 Feb 2024 06:17 )   PT: 14.9 sec;   INR: 1.33 ratio      Urinalysis Basic - ( 27 Feb 2024 06:17 )  Color: x / Appearance: x / SG: x / pH: x  Gluc: 180 mg/dL / Ketone: x  / Bili: x / Urobili: x   Blood: x / Protein: x / Nitrite: x   Leuk Esterase: x / RBC: x / WBC x   Sq Epi: x / Non Sq Epi: x / Bacteria: x    CULTURES:  Culture Results:   <10,000 CFU/mL Normal Urogenital Angelica (02-26 @ 09:59)  Culture Results:   No growth at 24 hours (02-26 @ 09:22)  Culture Results:   No growth at 24 hours (02-26 @ 09:22)    Physical Examination:  General: No acute distress.    HEENT: Pupils equal, reactive to light.  Symmetric.  PULM: Clear to auscultation bilaterally, no significant sputum production  NECK: Supple, no lymphadenopathy, trachea midline  CVS: Regular rate and rhythm, no murmurs, rubs, or gallops  ABD: Soft, nondistended, nontender, normoactive bowel sounds, no masses  EXT: No edema, nontender  SKIN: Warm and well perfused, no rashes noted.  NEURO: Alert, oriented, interactive, nonfocal  RADIOLOGY: Date of entry of this note is equal to the date of services rendered   Patient is a 88y old  Male who presents with a chief complaint of Infection due to severe acute respiratory syndrome coronavirus 2 (SARS-CoV-2)     (27 Feb 2024 12:22)    BRIEF HOSPITAL COURSE:   Mr. Baires is a 89 YO M w/pmhx of ILD, COPD on home o2, CAD, HFpEF admitted to CCU for acute hypoxic respiratory failure 2/2 ILD exacerbation, ILD exacerbation, and possible superimposed bacterial pneumonia c/b septic shock     Events last 24 hours:   - Worsening o2 saturation tonight, dipped into 70s, required initiation of AVAPS   - On levophed support  - Patient made DNR/DNI by family     Review of Systems:  Unable to assess given clinical condition     PAST MEDICAL & SURGICAL HISTORY:  Squamous cell carcinoma  [place] (hard of hearing)  uses hearing aid  Arthritis  Deep vein thrombosis (DVT)  Right leg 2016 ? unknown cause  Left carpal tunnel syndrome  DVT (deep venous thrombosis)  H/O inguinal hernia repair  2017 right  H/O colonoscopy  2014  H/O squamous cell carcinoma excision  Bilateral ear 2018  S/P cataract surgery  2009 bilateral  S/P foot surgery, right  1968  S/P arthroscopy of right shoulder  2019  History of carpal tunnel surgery of right wrist  2019    Medications:  azithromycin  IVPB 500 milliGRAM(s) IV Intermittent every 24 hours  azithromycin  IVPB      cefTRIAXone Injectable. 1000 milliGRAM(s) IV Push every 24 hours  remdesivir  IVPB   IV Intermittent   remdesivir  IVPB 100 milliGRAM(s) IV Intermittent every 24 hours  norepinephrine Infusion 0.05 MICROgram(s)/kG/Min IV Continuous <Continuous>  albuterol/ipratropium for Nebulization 3 milliLiter(s) Nebulizer every 6 hours  acetaminophen     Tablet .. 650 milliGRAM(s) Oral every 6 hours PRN  ondansetron Injectable 4 milliGRAM(s) IV Push every 6 hours PRN  aspirin enteric coated 81 milliGRAM(s) Oral daily  clopidogrel Tablet 75 milliGRAM(s) Oral daily  heparin   Injectable 5000 Unit(s) SubCutaneous every 8 hours  aluminum hydroxide/magnesium hydroxide/simethicone Suspension 30 milliLiter(s) Oral every 4 hours PRN  pantoprazole  Injectable 40 milliGRAM(s) IV Push daily  atorvastatin 10 milliGRAM(s) Oral at bedtime  dextrose 50% Injectable 25 Gram(s) IV Push once  dextrose 50% Injectable 12.5 Gram(s) IV Push once  dextrose 50% Injectable 25 Gram(s) IV Push once  dextrose Oral Gel 15 Gram(s) Oral once PRN  glucagon  Injectable 1 milliGRAM(s) IntraMuscular once  insulin lispro (ADMELOG) corrective regimen sliding scale   SubCutaneous three times a day before meals  insulin lispro (ADMELOG) corrective regimen sliding scale   SubCutaneous at bedtime  methylPREDNISolone sodium succinate Injectable 40 milliGRAM(s) IV Push every 8 hours  dextrose 5%. 1000 milliLiter(s) IV Continuous <Continuous>  dextrose 5%. 1000 milliLiter(s) IV Continuous <Continuous>  ferrous    sulfate 325 milliGRAM(s) Oral daily  lactated ringers. 1000 milliLiter(s) IV Continuous <Continuous>  chlorhexidine 2% Cloths 1 Application(s) Topical <User Schedule>    ICU Vital Signs Last 24 Hrs  T(C): 36.6 (27 Feb 2024 20:26), Max: 36.6 (27 Feb 2024 20:26)  T(F): 97.9 (27 Feb 2024 20:26), Max: 97.9 (27 Feb 2024 20:26)  HR: 100 (28 Feb 2024 00:00) (58 - 116)  BP: 122/54 (28 Feb 2024 00:00) (80/45 - 122/63)  BP(mean): 76 (28 Feb 2024 00:00) (56 - 92)  RR: 25 (28 Feb 2024 00:00) (13 - 37)  SpO2: 93% (28 Feb 2024 00:00) (78% - 100%)  O2 Parameters below as of 28 Feb 2024 00:00  Patient On (Oxygen Delivery Method): Avaps rate 16, , Pressure 12-18  O2 Concentration (%): 100    ABG - ( 27 Feb 2024 06:53 )  pH, Arterial: 7.29  pH, Blood: x     /  pCO2: 36    /  pO2: 253   / HCO3: 17    / Base Excess: -8.5  /  SaO2: 100       I&O's Detail    26 Feb 2024 07:01  -  27 Feb 2024 07:00  --------------------------------------------------------  IN:    IV PiggyBack: 100 mL    Lactated Ringers: 525 mL    Lactated Ringers Bolus: 1000 mL    Norepinephrine: 70 mL  Total IN: 1695 mL    OUT:    Incontinent per Condom Catheter (mL): 150 mL  Total OUT: 150 mL    Total NET: 1545 mL    27 Feb 2024 07:01  -  28 Feb 2024 00:10  --------------------------------------------------------  IN:    IV PiggyBack: 450 mL    Lactated Ringers: 941 mL    Norepinephrine: 125.6 mL  Total IN: 1516.6 mL    OUT:    Incontinent per Condom Catheter (mL): 800 mL  Total OUT: 800 mL    Total NET: 716.6 mL    LABS:                        9.8    9.93  )-----------( 191      ( 27 Feb 2024 06:17 )             30.6     02-27    141  |  114<H>  |  57<H>  ----------------------------<  180<H>  4.8   |  21<L>  |  1.39<H>    Ca    8.4<L>      27 Feb 2024 06:17  Phos  4.4     02-27  Mg     2.4     02-27    TPro  6.1  /  Alb  2.3<L>  /  TBili  0.6  /  DBili  x   /  AST  35  /  ALT  28  /  AlkPhos  114  02-27    POCT Blood Glucose.: 197 mg/dL (27 Feb 2024 21:32)    PT/INR - ( 27 Feb 2024 06:17 )   PT: 14.9 sec;   INR: 1.33 ratio      Urinalysis Basic - ( 27 Feb 2024 06:17 )  Color: x / Appearance: x / SG: x / pH: x  Gluc: 180 mg/dL / Ketone: x  / Bili: x / Urobili: x   Blood: x / Protein: x / Nitrite: x   Leuk Esterase: x / RBC: x / WBC x   Sq Epi: x / Non Sq Epi: x / Bacteria: x    CULTURES:  Culture Results:   <10,000 CFU/mL Normal Urogenital Angelica (02-26 @ 09:59)  Culture Results:   No growth at 24 hours (02-26 @ 09:22)  Culture Results:   No growth at 24 hours (02-26 @ 09:22)    Physical Examination:  General: Respiratory distress   HEENT: Pupils equal, reactive to light.  Symmetric.  PULM: Diminished breath sounds b/l   NECK: Supple, no lymphadenopathy, trachea midline  CVS: Regular rate and rhythm, no murmurs, rubs, or gallops  ABD: Soft, nondistended, nontender  EXT: No edema, nontender  SKIN: Warm and well perfused  NEURO: Lethargic   RADIOLOGY:

## 2024-02-28 NOTE — PROGRESS NOTE ADULT - SUBJECTIVE AND OBJECTIVE BOX
HPI:  "·  87 y/o male with PMHx of arthritis, DVT, ILD/ Copd on home Oxygen, CAD s/p PCI in the past year on DAPT, Mild diastolic Chf, EF 55%,  presents to the ED with wife c/o SOB. Patient endorses a cough for the last 2 weeks, states that the SOB worsened today. Denies fevers, vomiting, sputum production, vomiting, diarrhea, or chest pain.  Patient had Covid in Dec 2023, treated at home with Paxlovid  and never quite recover; he has been getting progressively more dyspneic and he came to the ED for evaluation  "     2/27  Patient seen and evaluated in ICU- patient was on Bipap comfortable and in no acute distress pt was aware of his hospitalization and had some basic knowledge of what had happened.      His capacity at that time was simple- i don't think he had full understanding of complex decisions this morning.  But he was in good spirits.     Mr. Baires at home is a very active person typically. Loves to do things for himself and his independence is invaluable to him.  His wife Katie , myself , ICU and pts Granddaughter Ashly- and her  Obdulio were present for discussions     Pt currently with what's thought to be acute exacerbation of his worsening lung disease.  Although he is covid positive this may just be from his prior infection.      We discussed Palliative Care team being a supportive team when a patient has ongoing illnesses.  We also discussed that it is not an end of life care service, but can help navigate symptoms and emotional support throughout their hospital stay here.     Hospice was explained as well as an end of life care philosophy. When a disease cannot be cured, or family/patient decide the treatment burdens out weight the risk and chose to change focus of treatment from cure to quality/comfort.       We discussed at length patients underlying clinical diagnosis at length.  We discussed resuscitation and risk and benefits of CPR. Risks include, broken ribs, lung puncture, pain and discomfort.   At this time due to patients underlying irreversible diagnosis of advanced lung disease and hx of CAD and overall decline over past two months  I would not recommend CPR because it would not reverse current medical condition.  They understand that DNR means NO CPR and that would allow natural death.  No CPR means no attempt to restart the heart once it has stopped.  Family verbalized understanding.     We also discussed mechanical ventilation in  an event that they could no longer breath on their own.  Risk and benefits of mechanical ventilation were discussed at length.  At this time, due to patients irreversible medical condition of  diagnosis of advanced lung disease and hx of CAD and overall decline over past two months  it is of concern that if patient were to be intubated, extubation may not be possible.  Also intubation would not reverse current medical condition which if progresses its natural course would lead to the patients death.  Patient was able to verbalized understanding.     2/28    pt seen and examined  earlier this morning was on BiPAP and more alert   later on stats dropped after being taken off for short time  pt went back on and family called for family meeting    Long discussion with family at this stage, we are recommending comfort measures as patient continues to decline regardless of maximal support.  We discussed prolonging suffering rather than prolonging life.     We discussed what that would mean,  no longer doing blood tests, dx imaging, abx, and palliative extubation to RA with comfort medications to address any symptoms that may arise. This would mean shortened life span but would focus on comfort and quality at the end of life.       family meeting held at bedside and family in waiting room updated  all questions answered    All family on same page.     Morphine 4mg ordered to be given 15minutes prior to removing bipap.     will montior patients response to medication and then transition to Hi-Harinder and wean from there.   Family wants to try slow wean from hi-harinder with family surrounding with him      All in agreement, we will not escalate back to bipap- if patient looks uncomfortable we will escalate appropriately, morphine dosing and or rotate opiates.     1mg Ativan also which he responded well to that as well.     will leave this available and monitor symptoms closely.     Palliative weaning this afternoon       PAIN: ( )Yes   ( x)No   DYSPNEA: (x ) Yes  ( ) No  Level: moderate- on bipap     PAST MEDICAL & SURGICAL HISTORY:  Squamous cell carcinoma      [place] (hard of hearing)  uses hearing aid      Arthritis      Deep vein thrombosis (DVT)  Right leg 2016 ? unknown cause      Left carpal tunnel syndrome      DVT (deep venous thrombosis)      H/O inguinal hernia repair  2017 right      H/O colonoscopy  2014      H/O squamous cell carcinoma excision  Bilateral ear 2018      S/P cataract surgery  2009 bilateral      S/P foot surgery, right  1968      S/P arthroscopy of right shoulder  2019      History of carpal tunnel surgery of right wrist  2019          SOCIAL HX:    Hx opiate tolerance ( )YES  (x )NO    Baseline ADLs  (Prior to Admission)  ( x) Independent   ( )Dependent    FAMILY HISTORY:  FH: bladder cancer  father    FHx: uterine cancer  sister        Review of Systems:    Anxiety-  Depression-  Physical Discomfort- calm- dsicomfort from bipap mask  Dyspnea-  ++   Constipation-   Diarrhea-  Nausea- ++  Vomiting-  Anorexia- none- reports hunger  Weight Loss-    Cough- +  Secretions- --  Fatigue- +  Weakness- ++  Delirium-    All other systems reviewed and negative  Unable to obtain/Limited due to:      PHYSICAL EXAM:    ICU Vital Signs Last 24 Hrs  T(C): 36.1 (28 Feb 2024 05:00), Max: 36.6 (27 Feb 2024 20:26)  T(F): 97 (28 Feb 2024 05:00), Max: 97.9 (27 Feb 2024 20:26)  HR: 85 (28 Feb 2024 15:00) (67 - 116)  BP: 100/55 (28 Feb 2024 15:00) (100/54 - 122/54)  BP(mean): 67 (28 Feb 2024 15:00) (67 - 86)  ABP: --  ABP(mean): --  RR: 22 (28 Feb 2024 15:00) (16 - 37)  SpO2: 100% (28 Feb 2024 15:00) (78% - 100%)    O2 Parameters below as of 28 Feb 2024 11:00  Patient On (Oxygen Delivery Method): BiPAP/CPAP        PPSV2:  30 %  FAST:    General: calm in NAD  Mental Status: awake alert oriented x2  HEENT: eomi, perrl  Lungs: decreased b/s with some scattered rhonchi  Cardiac: s1s2   GI: soft nontender +BS  : voids  Ext: moves all 4 extremities spontaneously  Neuro: no gross findings     LABS:                        9.4    14.21 )-----------( 200      ( 28 Feb 2024 05:28 )             29.8     02-28    144  |  114<H>  |  54<H>  ----------------------------<  152<H>  5.0   |  23  |  1.26    Ca    8.6      28 Feb 2024 05:28  Phos  4.4     02-27  Mg     2.4     02-27    TPro  6.0  /  Alb  3.0<L>  /  TBili  0.6  /  DBili  x   /  AST  24  /  ALT  19  /  AlkPhos  133<H>  02-28    PT/INR - ( 28 Feb 2024 05:28 )   PT: 16.1 sec;   INR: 1.44 ratio           Albumin: Albumin: 3.0 g/dL (02-28 @ 05:28)      Allergies    No Known Allergies    Intolerances      MEDICATIONS  (STANDING):  albuterol/ipratropium for Nebulization 3 milliLiter(s) Nebulizer every 6 hours  aspirin enteric coated 81 milliGRAM(s) Oral daily  atorvastatin 10 milliGRAM(s) Oral at bedtime  azithromycin  IVPB      azithromycin  IVPB 500 milliGRAM(s) IV Intermittent every 24 hours  cefTRIAXone Injectable. 1000 milliGRAM(s) IV Push every 24 hours  chlorhexidine 2% Cloths 1 Application(s) Topical <User Schedule>  clopidogrel Tablet 75 milliGRAM(s) Oral daily  dextrose 5%. 1000 milliLiter(s) (100 mL/Hr) IV Continuous <Continuous>  dextrose 5%. 1000 milliLiter(s) (50 mL/Hr) IV Continuous <Continuous>  dextrose 50% Injectable 25 Gram(s) IV Push once  dextrose 50% Injectable 12.5 Gram(s) IV Push once  dextrose 50% Injectable 25 Gram(s) IV Push once  ferrous    sulfate 325 milliGRAM(s) Oral daily  glucagon  Injectable 1 milliGRAM(s) IntraMuscular once  heparin   Injectable 5000 Unit(s) SubCutaneous every 8 hours  insulin lispro (ADMELOG) corrective regimen sliding scale   SubCutaneous at bedtime  insulin lispro (ADMELOG) corrective regimen sliding scale   SubCutaneous three times a day before meals  lactated ringers. 1000 milliLiter(s) (75 mL/Hr) IV Continuous <Continuous>  methylPREDNISolone sodium succinate Injectable 40 milliGRAM(s) IV Push every 8 hours  norepinephrine Infusion 0.05 MICROgram(s)/kG/Min (5.62 mL/Hr) IV Continuous <Continuous>  pantoprazole  Injectable 40 milliGRAM(s) IV Push daily  remdesivir  IVPB   IV Intermittent   remdesivir  IVPB 100 milliGRAM(s) IV Intermittent every 24 hours    MEDICATIONS  (PRN):  acetaminophen     Tablet .. 650 milliGRAM(s) Oral every 6 hours PRN Mild Pain (1 - 3)  aluminum hydroxide/magnesium hydroxide/simethicone Suspension 30 milliLiter(s) Oral every 4 hours PRN Dyspepsia  dextrose Oral Gel 15 Gram(s) Oral once PRN Blood Glucose LESS THAN 70 milliGRAM(s)/deciliter  ondansetron Injectable 4 milliGRAM(s) IV Push every 6 hours PRN Nausea and/or Vomiting      RADIOLOGY/ADDITIONAL STUDIES:   HPI:  "·  87 y/o male with PMHx of arthritis, DVT, ILD/ Copd on home Oxygen, CAD s/p PCI in the past year on DAPT, Mild diastolic Chf, EF 55%,  presents to the ED with wife c/o SOB. Patient endorses a cough for the last 2 weeks, states that the SOB worsened today. Denies fevers, vomiting, sputum production, vomiting, diarrhea, or chest pain.  Patient had Covid in Dec 2023, treated at home with Paxlovid  and never quite recover; he has been getting progressively more dyspneic and he came to the ED for evaluation  "     2/27  Patient seen and evaluated in ICU- patient was on Bipap comfortable and in no acute distress pt was aware of his hospitalization and had some basic knowledge of what had happened.      His capacity at that time was simple- i don't think he had full understanding of complex decisions this morning.  But he was in good spirits.     Mr. Baires at home is a very active person typically. Loves to do things for himself and his independence is invaluable to him.  His wife Katie , myself , ICU and pts Granddaughter Ashly- and her  Obdulio were present for discussions     Pt currently with what's thought to be acute exacerbation of his worsening lung disease.  Although he is covid positive this may just be from his prior infection.      We discussed Palliative Care team being a supportive team when a patient has ongoing illnesses.  We also discussed that it is not an end of life care service, but can help navigate symptoms and emotional support throughout their hospital stay here.     Hospice was explained as well as an end of life care philosophy. When a disease cannot be cured, or family/patient decide the treatment burdens out weight the risk and chose to change focus of treatment from cure to quality/comfort.       We discussed at length patients underlying clinical diagnosis at length.  We discussed resuscitation and risk and benefits of CPR. Risks include, broken ribs, lung puncture, pain and discomfort.   At this time due to patients underlying irreversible diagnosis of advanced lung disease and hx of CAD and overall decline over past two months  I would not recommend CPR because it would not reverse current medical condition.  They understand that DNR means NO CPR and that would allow natural death.  No CPR means no attempt to restart the heart once it has stopped.  Family verbalized understanding.     We also discussed mechanical ventilation in  an event that they could no longer breath on their own.  Risk and benefits of mechanical ventilation were discussed at length.  At this time, due to patients irreversible medical condition of  diagnosis of advanced lung disease and hx of CAD and overall decline over past two months  it is of concern that if patient were to be intubated, extubation may not be possible.  Also intubation would not reverse current medical condition which if progresses its natural course would lead to the patients death.  Patient was able to verbalized understanding.     2/28    pt seen and examined  earlier this morning was on BiPAP and more alert   later on stats dropped after being taken off for short time  pt went back on and family called for family meeting    Long discussion with family at this stage, we are recommending comfort measures as patient continues to decline regardless of maximal support.  We discussed prolonging suffering rather than prolonging life.     We discussed what that would mean,  no longer doing blood tests, dx imaging, abx, and palliative extubation to RA with comfort medications to address any symptoms that may arise. This would mean shortened life span but would focus on comfort and quality at the end of life.       family meeting held at bedside and family in waiting room updated  all questions answered    All family on same page.     Morphine 4mg ordered to be given 15minutes prior to removing bipap.     will montior patients response to medication and then transition to Hi-Harinder and wean from there.   Family wants to try slow wean from hi-harinder with family surrounding with him      All in agreement, we will not escalate back to bipap- if patient looks uncomfortable we will escalate appropriately, morphine dosing and or rotate opiates.     1mg Ativan also which he responded well to that as well.     will leave this available and monitor symptoms closely.     Palliative weaning this afternoon       Dyspnea ++      Review of Systems:    Anxiety-  Depression-  Physical Discomfort- calm- discomfort from bipap mask  -->weaned to hi-flow w/ plan to slowly wean to roomair  Dyspnea-  ++   Constipation-   Diarrhea-  Nausea- ++  Vomiting-  Anorexia- none- reports hunger  Weight Loss-    Cough- +  Secretions- --  Fatigue- +  Weakness- ++  Delirium-    All other systems reviewed and negative  Unable to obtain/Limited due to:      PHYSICAL EXAM:    ICU Vital Signs Last 24 Hrs  T(C): 36.1 (28 Feb 2024 05:00), Max: 36.6 (27 Feb 2024 20:26)  T(F): 97 (28 Feb 2024 05:00), Max: 97.9 (27 Feb 2024 20:26)  HR: 85 (28 Feb 2024 15:00) (67 - 116)  BP: 100/55 (28 Feb 2024 15:00) (100/54 - 122/54)  BP(mean): 67 (28 Feb 2024 15:00) (67 - 86)  ABP: --  ABP(mean): --  RR: 22 (28 Feb 2024 15:00) (16 - 37)  SpO2: 100% (28 Feb 2024 15:00) (78% - 100%)    O2 Parameters below as of 28 Feb 2024 11:00  Patient On (Oxygen Delivery Method): BiPAP/CPAP        PPSV2:  30 %  FAST:    General: calm in NAD  Mental Status: awake alert oriented x1  HEENT: eomi, perrl  Lungs: decreased b/s with some scattered rhonchi  Cardiac: s1s2   GI: soft nontender +BS  : voids  Ext: moves all 4 extremities spontaneously  Neuro: no gross findings     LABS:                        9.4    14.21 )-----------( 200      ( 28 Feb 2024 05:28 )             29.8     02-28    144  |  114<H>  |  54<H>  ----------------------------<  152<H>  5.0   |  23  |  1.26    Ca    8.6      28 Feb 2024 05:28  Phos  4.4     02-27  Mg     2.4     02-27    TPro  6.0  /  Alb  3.0<L>  /  TBili  0.6  /  DBili  x   /  AST  24  /  ALT  19  /  AlkPhos  133<H>  02-28    PT/INR - ( 28 Feb 2024 05:28 )   PT: 16.1 sec;   INR: 1.44 ratio           Albumin: Albumin: 3.0 g/dL (02-28 @ 05:28)      Allergies    No Known Allergies    Intolerances      MEDICATIONS  (STANDING):  albuterol/ipratropium for Nebulization 3 milliLiter(s) Nebulizer every 6 hours  aspirin enteric coated 81 milliGRAM(s) Oral daily  atorvastatin 10 milliGRAM(s) Oral at bedtime  azithromycin  IVPB      azithromycin  IVPB 500 milliGRAM(s) IV Intermittent every 24 hours  cefTRIAXone Injectable. 1000 milliGRAM(s) IV Push every 24 hours  chlorhexidine 2% Cloths 1 Application(s) Topical <User Schedule>  clopidogrel Tablet 75 milliGRAM(s) Oral daily  dextrose 5%. 1000 milliLiter(s) (100 mL/Hr) IV Continuous <Continuous>  dextrose 5%. 1000 milliLiter(s) (50 mL/Hr) IV Continuous <Continuous>  dextrose 50% Injectable 25 Gram(s) IV Push once  dextrose 50% Injectable 12.5 Gram(s) IV Push once  dextrose 50% Injectable 25 Gram(s) IV Push once  ferrous    sulfate 325 milliGRAM(s) Oral daily  glucagon  Injectable 1 milliGRAM(s) IntraMuscular once  heparin   Injectable 5000 Unit(s) SubCutaneous every 8 hours  insulin lispro (ADMELOG) corrective regimen sliding scale   SubCutaneous at bedtime  insulin lispro (ADMELOG) corrective regimen sliding scale   SubCutaneous three times a day before meals  lactated ringers. 1000 milliLiter(s) (75 mL/Hr) IV Continuous <Continuous>  methylPREDNISolone sodium succinate Injectable 40 milliGRAM(s) IV Push every 8 hours  norepinephrine Infusion 0.05 MICROgram(s)/kG/Min (5.62 mL/Hr) IV Continuous <Continuous>  pantoprazole  Injectable 40 milliGRAM(s) IV Push daily  remdesivir  IVPB   IV Intermittent   remdesivir  IVPB 100 milliGRAM(s) IV Intermittent every 24 hours    MEDICATIONS  (PRN):  acetaminophen     Tablet .. 650 milliGRAM(s) Oral every 6 hours PRN Mild Pain (1 - 3)  aluminum hydroxide/magnesium hydroxide/simethicone Suspension 30 milliLiter(s) Oral every 4 hours PRN Dyspepsia  dextrose Oral Gel 15 Gram(s) Oral once PRN Blood Glucose LESS THAN 70 milliGRAM(s)/deciliter  ondansetron Injectable 4 milliGRAM(s) IV Push every 6 hours PRN Nausea and/or Vomiting      RADIOLOGY/ADDITIONAL STUDIES:

## 2024-02-28 NOTE — DISCHARGE NOTE FOR THE EXPIRED PATIENT - HOSPITAL COURSE
88M with pmhx of ILD, COPD (on home O2), CAD (s/p PCI), HFpEF (EF 55%) presented to  ED complaining of shortness of breath and cough x1 week. Pt admitted to CCU for acute hypoxic respiratory failure 2/2 ILD exacerbation, COVID PNA and possible superimposed bacterial PNA complicated by septic shock.  Pt had worsening O2 saturation and placed on AVAPS for respiratory support and levophed for BP support.  Pt ultimately made DNR/DNI with comfort measures only by family and taken of NIV.    [facility] pt was found to have no spontaneous respirations auscultated via stethoscope in all lung fields bilaterally, no spontaneous pulse at carotid and femoral artery, no heart sounds auscultated at right and left sternal boarders including PMI. Pupils were fixed and dilated and non reactive to light, skin pale.  Pt pronounced death at 1953.  Pt family at bedside and aware of pt death. Family offered comfort and support measures.

## 2024-02-28 NOTE — PROGRESS NOTE ADULT - ASSESSMENT
Process of Care  --Reviewed dx/treatment problems and alignment with Goals of Care    Physical Aspects of Care  --Pain  patient denies at this time  c/w current managment    --Bowel Regimen  denies constipation  risk for constipation d/t immobility  daily dulcolax    --Dyspnea    mORPHINE 4mg 1 x given removed bipap  shorly after pt w/ dyspnea and anxiety  ativan 2mg x1 given   pt still w/ resp distress and 1.5mg Dilaudid for severe dyspnea ordered  discussed w/ pharmacy  approved and nurse     Delirium ppx  -Initiate melatonin 5mg qhs, to promote maintenance of circadian rhythm/restful sleep, and for ppx of future susceptibility to delirium upon resolution of presenting condition.  -Avoid deliriogenic agents including BZD, anticholinergics, and sedating agents if possible  -Re-orient frequently, encourage family at bedside as able.   -Early mobilization recommended if feasible.     --Nausea Vomiting  denies    --Weakness  PT as tolerated     Psychological and Psychiatric Aspects of Care:   --Greif/Bereavment: emotional support provided  --Hx of psychiatric dx: none  -Pastoral Care Available PRN     Social Aspects of Care  -SW involved     Cultural Aspects  -Primary Language: English    Goals of Care:   as above  considering DNR DNI   will sign later otnight.       Prognosis: Death can occur at anytime, but if disease continues to progress naturally patient likely has days to weeks.    Ethical and Legal Aspects:   NA        Capacity: simple  HCP/Surrogate:  wife Katie  Code Status: DNR DNI COMFORT  MOLST:  Dispo Plan:    Discussed With: Case coordinated with attending and SW and RN     Time Spent: 90 minutes including the care, coordination and counseling of this patient, 50% of which was spent coordinating and counseling.

## 2024-02-28 NOTE — PROGRESS NOTE ADULT - ASSESSMENT
Patient with hx. of ILD, previous covid  now presents with worsening infiltrates  now COVID positive again  worsening cxr, hypoxia,  at high risk of intubation    Neuro - lethargic non focal, will give xanax and morphine prn, dyspnea or anxiety  Respiratory - ILD and covid, on remdesivir, steroids, on abx. but procal of 0.08 makes bacterial pneumonia lest likely  cv - nsr, on levophed, will titrate down, slightly elevated troponin, 200, likely demand ischemia,echo preserved ef  Renal - KYRA creatinine inc likely from dehydration, improved with hydration  GI - abdomen soft, tolerate diet when awake  DVT P venous dopplers negative, heparin sub  Endo tsh ok, sliding scale for steroid induced hyperglycemia  GOC - DNR/DNI, if needed will prn ativan, morphine      Patient with hx. of ILD, previous covid  now presents with worsening infiltrates  now COVID positive again  worsening cxr, hypoxia,  at high risk of intubation    Neuro - lethargic non focal, will give xanax and morphine prn, dyspnea or anxiety  Respiratory - ILD and covid, on remdesivir, steroids, on abx. but procal of 0.08 makes bacterial pneumonia lest likely  cv - nsr, on levophed, will titrate down, slightly elevated troponin, 200, likely demand ischemia,echo preserved ef  Renal - KYRA creatinine inc likely from dehydration, improved with hydration  GI - abdomen soft, tolerate diet when awake  DVT P venous dopplers negative, heparin sub  Endo tsh ok, sliding scale for steroid induced hyperglycemia  GOC - DNR/DNI, if needed will prn ativan, morphine       addendum: Family at bedside, bipap removed, at this point will provide comfort measures only, pateint to get atvian  and dilaudid

## 2024-03-02 LAB
CULTURE RESULTS: SIGNIFICANT CHANGE UP
CULTURE RESULTS: SIGNIFICANT CHANGE UP
SPECIMEN SOURCE: SIGNIFICANT CHANGE UP
SPECIMEN SOURCE: SIGNIFICANT CHANGE UP

## 2024-03-04 DIAGNOSIS — J12.82 PNEUMONIA DUE TO CORONAVIRUS DISEASE 2019: ICD-10-CM

## 2024-03-04 DIAGNOSIS — R65.21 SEVERE SEPSIS WITH SEPTIC SHOCK: ICD-10-CM

## 2024-03-04 DIAGNOSIS — Z79.02 LONG TERM (CURRENT) USE OF ANTITHROMBOTICS/ANTIPLATELETS: ICD-10-CM

## 2024-03-04 DIAGNOSIS — Z95.5 PRESENCE OF CORONARY ANGIOPLASTY IMPLANT AND GRAFT: ICD-10-CM

## 2024-03-04 DIAGNOSIS — Z51.5 ENCOUNTER FOR PALLIATIVE CARE: ICD-10-CM

## 2024-03-04 DIAGNOSIS — E87.8 OTHER DISORDERS OF ELECTROLYTE AND FLUID BALANCE, NOT ELSEWHERE CLASSIFIED: ICD-10-CM

## 2024-03-04 DIAGNOSIS — A41.9 SEPSIS, UNSPECIFIED ORGANISM: ICD-10-CM

## 2024-03-04 DIAGNOSIS — J96.21 ACUTE AND CHRONIC RESPIRATORY FAILURE WITH HYPOXIA: ICD-10-CM

## 2024-03-04 DIAGNOSIS — Z79.82 LONG TERM (CURRENT) USE OF ASPIRIN: ICD-10-CM

## 2024-03-04 DIAGNOSIS — I50.32 CHRONIC DIASTOLIC (CONGESTIVE) HEART FAILURE: ICD-10-CM

## 2024-03-04 DIAGNOSIS — H91.90 UNSPECIFIED HEARING LOSS, UNSPECIFIED EAR: ICD-10-CM

## 2024-03-04 DIAGNOSIS — G93.41 METABOLIC ENCEPHALOPATHY: ICD-10-CM

## 2024-03-04 DIAGNOSIS — N18.30 CHRONIC KIDNEY DISEASE, STAGE 3 UNSPECIFIED: ICD-10-CM

## 2024-03-04 DIAGNOSIS — Z99.81 DEPENDENCE ON SUPPLEMENTAL OXYGEN: ICD-10-CM

## 2024-03-04 DIAGNOSIS — N17.0 ACUTE KIDNEY FAILURE WITH TUBULAR NECROSIS: ICD-10-CM

## 2024-03-04 DIAGNOSIS — Z66 DO NOT RESUSCITATE: ICD-10-CM

## 2024-03-04 DIAGNOSIS — E86.0 DEHYDRATION: ICD-10-CM

## 2024-03-04 DIAGNOSIS — E43 UNSPECIFIED SEVERE PROTEIN-CALORIE MALNUTRITION: ICD-10-CM

## 2024-03-04 DIAGNOSIS — Z86.718 PERSONAL HISTORY OF OTHER VENOUS THROMBOSIS AND EMBOLISM: ICD-10-CM

## 2024-03-04 DIAGNOSIS — J44.1 CHRONIC OBSTRUCTIVE PULMONARY DISEASE WITH (ACUTE) EXACERBATION: ICD-10-CM

## 2024-03-04 DIAGNOSIS — U07.1 COVID-19: ICD-10-CM

## 2024-03-04 DIAGNOSIS — Z79.51 LONG TERM (CURRENT) USE OF INHALED STEROIDS: ICD-10-CM

## 2024-03-04 DIAGNOSIS — J44.0 CHRONIC OBSTRUCTIVE PULMONARY DISEASE WITH (ACUTE) LOWER RESPIRATORY INFECTION: ICD-10-CM

## 2024-03-04 DIAGNOSIS — E87.20 ACIDOSIS, UNSPECIFIED: ICD-10-CM

## 2024-03-04 DIAGNOSIS — M19.90 UNSPECIFIED OSTEOARTHRITIS, UNSPECIFIED SITE: ICD-10-CM

## 2024-03-04 DIAGNOSIS — J84.10 PULMONARY FIBROSIS, UNSPECIFIED: ICD-10-CM

## 2024-03-04 DIAGNOSIS — I25.10 ATHEROSCLEROTIC HEART DISEASE OF NATIVE CORONARY ARTERY WITHOUT ANGINA PECTORIS: ICD-10-CM

## 2024-03-04 DIAGNOSIS — R64 CACHEXIA: ICD-10-CM

## 2024-03-04 DIAGNOSIS — Z85.828 PERSONAL HISTORY OF OTHER MALIGNANT NEOPLASM OF SKIN: ICD-10-CM

## 2024-05-28 ENCOUNTER — APPOINTMENT (OUTPATIENT)
Dept: DERMATOLOGY | Facility: CLINIC | Age: 89
End: 2024-05-28

## 2024-06-09 NOTE — H&P PST ADULT - VISION (WITH CORRECTIVE LENSES IF THE PATIENT USUALLY WEARS THEM):
Sutured Wound Care     [facility]: [phone]    [facility]: [phone]    Left sideburn      ? No strenuous activity for 48 hours. Resume moderate activity in 48 hours. No heavy exercising until you are seen for follow up in one week.     ? Take Tylenol as needed for discomfort.                         ? Do not drink alcoholic beverages for 48 hours.     ? Keep the pressure bandage in place for 24 hours. If the bandage becomes blood tinged or loose, reinforce it with gauze and tape.        (Refer to the reverse side of this page for management of bleeding).    ? Remove pressure bandage in 24 hours     ? Leave the flat bandage in place until your follow up appointment.    ? Keep the bandage dry. Wash around it carefully.    ? If the tape becomes soiled or starts to come off, reinforce it with additional paper tape.    ? Do not smoke for 3 weeks; smoking is detrimental to wound healing.    ? It is normal to have swelling and bruising around the surgical site. The bruising will fade in approximately 10-14 days. Elevate the area to reduce swelling.    ? Numbness, itchiness and sensitivity to temperature changes can occur after surgery and may take up to 18 months to normalize.      POSSIBLE COMPLICATIONS    BLEEDIN. Leave the bandage in place.  2. Use tightly rolled up gauze or a cloth to apply direct pressure over the bandage for 20   minutes.  3. Reapply pressure for an additional 20 minutes if necessary  4. Call the office or go to the nearest emergency room if pressure fails to stop the bleeding.  5. Use additional gauze and tape to maintain pressure once the bleeding has stopped.        PAIN:    1. Post operative pain should slowly get better, never worse.  2. A severe increase in pain may indicate a problem. Call the office if this occurs.    In case of emergency phone:Dr Mehta [phone]    
normal (ped)...
Normal vision: sees adequately in most situations; can see medication labels, newsprint

## 2024-07-06 NOTE — ASU PATIENT PROFILE, ADULT - FALL HARM RISK TYPE OF ASSESSMENT
Ok to dc per Sophia.  Dc instructions reviewed with patient.  Medications and side effects reviewed with patient.  Verbalizes understanding.  List given to choose PCP.  Instructed patient to f/u for ECHO and PCP in 1-2 weeks.  IV access removed without complication.  To lobby via wheelchair.    Admission

## 2025-07-06 NOTE — ASU PATIENT PROFILE, ADULT - NSALCOHOLAMT_GEN_A_CORE_SD
1-2 drinks